# Patient Record
Sex: FEMALE | Race: WHITE | NOT HISPANIC OR LATINO | Employment: OTHER | URBAN - METROPOLITAN AREA
[De-identification: names, ages, dates, MRNs, and addresses within clinical notes are randomized per-mention and may not be internally consistent; named-entity substitution may affect disease eponyms.]

---

## 2017-04-08 ENCOUNTER — HISTORICAL (OUTPATIENT)
Dept: LAB | Facility: HOSPITAL | Age: 82
End: 2017-04-08

## 2017-05-06 ENCOUNTER — HISTORICAL (OUTPATIENT)
Dept: LAB | Facility: HOSPITAL | Age: 82
End: 2017-05-06

## 2017-05-06 LAB
CHOLEST SERPL-MCNC: 167 MG/DL (ref 0–200)
CHOLEST/HDLC SERPL: 3.2 {RATIO} (ref 0–4)
CK SERPL-CCNC: 53 UNIT/L (ref 26–192)
CRP SERPL HS-MCNC: 0.6 MG/L
ERYTHROCYTE [SEDIMENTATION RATE] IN BLOOD: 7 MM/HR (ref 0–30)
HDLC SERPL-MCNC: 53 MG/DL (ref 35–60)
LDLC SERPL CALC-MCNC: 83 MG/DL (ref 0–129)
TRIGL SERPL-MCNC: 157 MG/DL (ref 30–150)
VLDLC SERPL CALC-MCNC: 31 MG/DL

## 2017-12-16 ENCOUNTER — HISTORICAL (OUTPATIENT)
Dept: LAB | Facility: HOSPITAL | Age: 82
End: 2017-12-16

## 2017-12-16 LAB
ABS NEUT (OLG): 5.25 X10(3)/MCL (ref 2.1–9.2)
ALBUMIN SERPL-MCNC: 3.8 GM/DL (ref 3.4–5)
ALBUMIN/GLOB SERPL: 1 {RATIO}
ALP SERPL-CCNC: 109 UNIT/L (ref 45–117)
ALT SERPL-CCNC: 22 UNIT/L (ref 13–56)
AST SERPL-CCNC: 23 UNIT/L (ref 15–37)
BILIRUB SERPL-MCNC: 0.5 MG/DL (ref 0.2–1)
BILIRUBIN DIRECT+TOT PNL SERPL-MCNC: 0.1 MG/DL (ref 0–0.2)
BILIRUBIN DIRECT+TOT PNL SERPL-MCNC: 0.4 MG/DL (ref 0–1)
BUN SERPL-MCNC: 24 MG/DL (ref 7–18)
CALCIUM SERPL-MCNC: 10.1 MG/DL (ref 8.5–10.1)
CHLORIDE SERPL-SCNC: 101 MMOL/L (ref 98–107)
CHOLEST SERPL-MCNC: 224 MG/DL (ref 0–200)
CHOLEST/HDLC SERPL: 5.5 {RATIO} (ref 0–4)
CO2 SERPL-SCNC: 30 MMOL/L (ref 21–32)
CREAT SERPL-MCNC: 1.1 MG/DL (ref 0.55–1.02)
ERYTHROCYTE [DISTWIDTH] IN BLOOD BY AUTOMATED COUNT: 12.9 % (ref 11.5–17)
GLOBULIN SER-MCNC: 4 GM/DL (ref 2–4)
GLUCOSE SERPL-MCNC: 94 MG/DL (ref 74–106)
HCT VFR BLD AUTO: 44.2 % (ref 37–47)
HDLC SERPL-MCNC: 41 MG/DL (ref 35–60)
HGB BLD-MCNC: 14.6 GM/DL (ref 12–16)
LDLC SERPL CALC-MCNC: 132 MG/DL (ref 0–129)
MCH RBC QN AUTO: 29.6 PG (ref 27–31)
MCHC RBC AUTO-ENTMCNC: 33 GM/DL (ref 33–36)
MCV RBC AUTO: 89.5 FL (ref 80–94)
PLATELET # BLD AUTO: 216 X10(3)/MCL (ref 130–400)
PMV BLD AUTO: 8.9 FL (ref 7.4–10.4)
POTASSIUM SERPL-SCNC: 4.1 MMOL/L (ref 3.5–5.1)
PROT SERPL-MCNC: 7.5 GM/DL (ref 6.4–8.2)
RBC # BLD AUTO: 4.94 X10(6)/MCL (ref 4.2–5.4)
SODIUM SERPL-SCNC: 138 MMOL/L (ref 136–145)
TRIGL SERPL-MCNC: 253 MG/DL (ref 30–150)
TSH SERPL-ACNC: 1.31 MIU/ML (ref 0.36–3.74)
VLDLC SERPL CALC-MCNC: 51 MG/DL
WBC # SPEC AUTO: 8.5 X10(3)/MCL (ref 4.5–11.5)

## 2018-05-01 ENCOUNTER — HISTORICAL (OUTPATIENT)
Dept: RADIOLOGY | Facility: HOSPITAL | Age: 83
End: 2018-05-01

## 2018-05-04 ENCOUNTER — HISTORICAL (OUTPATIENT)
Dept: LAB | Facility: HOSPITAL | Age: 83
End: 2018-05-04

## 2018-05-04 LAB
ABS NEUT (OLG): 5.12 X10(3)/MCL (ref 2.1–9.2)
ALBUMIN SERPL-MCNC: 3.6 GM/DL (ref 3.4–5)
ALBUMIN/GLOB SERPL: 1 {RATIO}
ALP SERPL-CCNC: 73 UNIT/L (ref 45–117)
ALT SERPL-CCNC: 20 UNIT/L (ref 13–56)
AST SERPL-CCNC: 20 UNIT/L (ref 15–37)
BILIRUB SERPL-MCNC: 0.5 MG/DL (ref 0.2–1)
BILIRUBIN DIRECT+TOT PNL SERPL-MCNC: 0.1 MG/DL (ref 0–0.2)
BILIRUBIN DIRECT+TOT PNL SERPL-MCNC: 0.4 MG/DL (ref 0–1)
BUN SERPL-MCNC: 23 MG/DL (ref 7–18)
CALCIUM SERPL-MCNC: 10.3 MG/DL (ref 8.5–10.1)
CHLORIDE SERPL-SCNC: 103 MMOL/L (ref 98–107)
CHOLEST SERPL-MCNC: 156 MG/DL (ref 0–200)
CHOLEST/HDLC SERPL: 3.6 {RATIO} (ref 0–4)
CO2 SERPL-SCNC: 32 MMOL/L (ref 21–32)
CREAT SERPL-MCNC: 1.21 MG/DL (ref 0.55–1.02)
ERYTHROCYTE [DISTWIDTH] IN BLOOD BY AUTOMATED COUNT: 12.8 % (ref 11.5–17)
GLOBULIN SER-MCNC: 4 GM/DL (ref 2–4)
GLUCOSE SERPL-MCNC: 88 MG/DL (ref 74–106)
HCT VFR BLD AUTO: 45.1 % (ref 37–47)
HDLC SERPL-MCNC: 43 MG/DL (ref 35–60)
HGB BLD-MCNC: 14.7 GM/DL (ref 12–16)
LDLC SERPL CALC-MCNC: 61 MG/DL (ref 0–129)
MCH RBC QN AUTO: 29.3 PG (ref 27–31)
MCHC RBC AUTO-ENTMCNC: 32.6 GM/DL (ref 33–36)
MCV RBC AUTO: 89.8 FL (ref 80–94)
PLATELET # BLD AUTO: 208 X10(3)/MCL (ref 130–400)
PMV BLD AUTO: 9 FL (ref 7.4–10.4)
POTASSIUM SERPL-SCNC: 4.6 MMOL/L (ref 3.5–5.1)
PROT SERPL-MCNC: 7.1 GM/DL (ref 6.4–8.2)
RBC # BLD AUTO: 5.02 X10(6)/MCL (ref 4.2–5.4)
SODIUM SERPL-SCNC: 142 MMOL/L (ref 136–145)
TRIGL SERPL-MCNC: 260 MG/DL (ref 30–150)
TSH SERPL-ACNC: 2.11 MIU/ML (ref 0.36–3.74)
VLDLC SERPL CALC-MCNC: 52 MG/DL
WBC # SPEC AUTO: 8.3 X10(3)/MCL (ref 4.5–11.5)

## 2018-05-31 ENCOUNTER — HISTORICAL (OUTPATIENT)
Dept: RADIOLOGY | Facility: HOSPITAL | Age: 83
End: 2018-05-31

## 2018-08-01 ENCOUNTER — HISTORICAL (OUTPATIENT)
Dept: LAB | Facility: HOSPITAL | Age: 83
End: 2018-08-01

## 2018-08-01 LAB
HEMOCCULT SP1 STL QL: NEGATIVE
HEMOCCULT SP2 STL QL: NEGATIVE

## 2018-10-30 ENCOUNTER — HISTORICAL (OUTPATIENT)
Dept: LAB | Facility: HOSPITAL | Age: 83
End: 2018-10-30

## 2018-10-30 LAB
ABS NEUT (OLG): 5.08 X10(3)/MCL (ref 2.1–9.2)
ALBUMIN SERPL-MCNC: 3.8 GM/DL (ref 3.4–5)
ALBUMIN/GLOB SERPL: 1 {RATIO}
ALP SERPL-CCNC: 90 UNIT/L (ref 45–117)
ALT SERPL-CCNC: 21 UNIT/L (ref 13–56)
AST SERPL-CCNC: 23 UNIT/L (ref 15–37)
BASOPHILS # BLD AUTO: 0.04 X10(3)/MCL (ref 0–0.2)
BASOPHILS NFR BLD AUTO: 0.5 % (ref 0–1)
BILIRUB SERPL-MCNC: 0.8 MG/DL (ref 0.2–1)
BILIRUBIN DIRECT+TOT PNL SERPL-MCNC: 0.1 MG/DL (ref 0–0.2)
BILIRUBIN DIRECT+TOT PNL SERPL-MCNC: 0.7 MG/DL (ref 0–1)
BUN SERPL-MCNC: 25 MG/DL (ref 7–18)
CALCIUM SERPL-MCNC: 9.7 MG/DL (ref 8.5–10.1)
CHLORIDE SERPL-SCNC: 103 MMOL/L (ref 98–107)
CHOLEST SERPL-MCNC: 170 MG/DL (ref 0–199)
CHOLEST/HDLC SERPL: 4 MG/DL (ref 0–8)
CO2 SERPL-SCNC: 26 MMOL/L (ref 21–32)
CREAT SERPL-MCNC: 1.19 MG/DL (ref 0.55–1.02)
EOSINOPHIL # BLD AUTO: 0.26 X10(3)/MCL (ref 0–0.9)
EOSINOPHIL NFR BLD AUTO: 3.3 % (ref 0–6.4)
ERYTHROCYTE [DISTWIDTH] IN BLOOD BY AUTOMATED COUNT: 13.3 % (ref 11.5–17)
GLOBULIN SER-MCNC: 3 GM/DL (ref 2–4)
GLUCOSE SERPL-MCNC: 94 MG/DL (ref 74–106)
HCT VFR BLD AUTO: 45.3 % (ref 37–47)
HDLC SERPL-MCNC: 46 MG/DL
HGB BLD-MCNC: 15.1 GM/DL (ref 12–16)
IMM GRANULOCYTES # BLD AUTO: 0.01 10*3/UL (ref 0–0.02)
IMM GRANULOCYTES NFR BLD AUTO: 0.1 % (ref 0–0.43)
LDLC SERPL CALC-MCNC: 85 MG/DL (ref 0–129)
LYMPHOCYTES # BLD AUTO: 1.81 X10(3)/MCL (ref 0.6–4.6)
LYMPHOCYTES NFR BLD AUTO: 22.7 % (ref 16–44)
MCH RBC QN AUTO: 30 PG (ref 27–31)
MCHC RBC AUTO-ENTMCNC: 33.3 GM/DL (ref 33–36)
MCV RBC AUTO: 89.9 FL (ref 80–94)
MONOCYTES # BLD AUTO: 0.76 X10(3)/MCL (ref 0.1–1.3)
MONOCYTES NFR BLD AUTO: 9.5 % (ref 4–12.1)
NEUTROPHILS # BLD AUTO: 5.08 X10(3)/MCL (ref 2.1–9.2)
NEUTROPHILS NFR BLD AUTO: 63.9 % (ref 43–73)
NRBC BLD AUTO-RTO: 0 % (ref 0–0.2)
PLATELET # BLD AUTO: 211 X10(3)/MCL (ref 130–400)
PMV BLD AUTO: 9.6 FL (ref 7.4–10.4)
POTASSIUM SERPL-SCNC: 4.5 MMOL/L (ref 3.5–5.1)
PROT SERPL-MCNC: 7.2 GM/DL (ref 6.4–8.2)
RBC # BLD AUTO: 5.04 X10(6)/MCL (ref 4.2–5.4)
SODIUM SERPL-SCNC: 137 MMOL/L (ref 136–145)
T4 FREE SERPL-MCNC: 1.18 NG/DL (ref 0.76–1.46)
TRIGL SERPL-MCNC: 193 MG/DL (ref 0–149)
TSH SERPL-ACNC: 1.66 MIU/ML (ref 0.36–3.74)
VLDLC SERPL CALC-MCNC: 39 MG/DL
WBC # SPEC AUTO: 8 X10(3)/MCL (ref 4.5–11.5)

## 2019-03-25 ENCOUNTER — HISTORICAL (OUTPATIENT)
Dept: LAB | Facility: HOSPITAL | Age: 84
End: 2019-03-25

## 2019-03-25 LAB
ABS NEUT (OLG): 5.95 X10(3)/MCL (ref 2.1–9.2)
ALBUMIN SERPL-MCNC: 3.7 GM/DL (ref 3.4–5)
ALBUMIN/GLOB SERPL: 0.9 {RATIO}
ALP SERPL-CCNC: 80 UNIT/L (ref 45–117)
ALT SERPL-CCNC: 21 UNIT/L (ref 13–56)
AST SERPL-CCNC: 22 UNIT/L (ref 15–37)
BASOPHILS # BLD AUTO: 0.03 X10(3)/MCL (ref 0–0.2)
BASOPHILS NFR BLD AUTO: 0.3 % (ref 0–1)
BILIRUB SERPL-MCNC: 0.6 MG/DL (ref 0.2–1)
BILIRUBIN DIRECT+TOT PNL SERPL-MCNC: 0.19 MG/DL (ref 0–0.2)
BILIRUBIN DIRECT+TOT PNL SERPL-MCNC: 0.41 MG/DL (ref 0–1)
BUN SERPL-MCNC: 23 MG/DL (ref 7–18)
CALCIUM SERPL-MCNC: 9.9 MG/DL (ref 8.5–10.1)
CHLORIDE SERPL-SCNC: 100 MMOL/L (ref 98–107)
CHOLEST SERPL-MCNC: 182 MG/DL (ref 0–199)
CHOLEST/HDLC SERPL: 4 MG/DL (ref 0–8)
CK SERPL-CCNC: 57 UNIT/L (ref 26–192)
CO2 SERPL-SCNC: 30 MMOL/L (ref 21–32)
CREAT SERPL-MCNC: 1.06 MG/DL (ref 0.55–1.02)
CRP SERPL HS-MCNC: 1.82 MG/L
EOSINOPHIL # BLD AUTO: 0.24 X10(3)/MCL (ref 0–0.9)
EOSINOPHIL NFR BLD AUTO: 2.6 % (ref 0–6.4)
ERYTHROCYTE [DISTWIDTH] IN BLOOD BY AUTOMATED COUNT: 12.8 % (ref 11.5–17)
ERYTHROCYTE [SEDIMENTATION RATE] IN BLOOD: 13 MM/HR (ref 0–30)
GLOBULIN SER-MCNC: 4 GM/DL (ref 2–4)
GLUCOSE SERPL-MCNC: 87 MG/DL (ref 74–106)
HCT VFR BLD AUTO: 49.4 % (ref 37–47)
HDLC SERPL-MCNC: 47 MG/DL
HGB BLD-MCNC: 16.9 GM/DL (ref 12–16)
IMM GRANULOCYTES # BLD AUTO: 0.02 10*3/UL (ref 0–0.02)
IMM GRANULOCYTES NFR BLD AUTO: 0.2 % (ref 0–0.43)
LDLC SERPL CALC-MCNC: 87 MG/DL (ref 0–129)
LYMPHOCYTES # BLD AUTO: 2.19 X10(3)/MCL (ref 0.6–4.6)
LYMPHOCYTES NFR BLD AUTO: 23.6 % (ref 16–44)
MCH RBC QN AUTO: 31.9 PG (ref 27–31)
MCHC RBC AUTO-ENTMCNC: 34.2 GM/DL (ref 33–36)
MCV RBC AUTO: 93.2 FL (ref 80–94)
MONOCYTES # BLD AUTO: 0.86 X10(3)/MCL (ref 0.1–1.3)
MONOCYTES NFR BLD AUTO: 9.3 % (ref 4–12.1)
NEUTROPHILS # BLD AUTO: 5.95 X10(3)/MCL (ref 2.1–9.2)
NEUTROPHILS NFR BLD AUTO: 64 % (ref 43–73)
NRBC BLD AUTO-RTO: 0 % (ref 0–0.2)
PLATELET # BLD AUTO: 225 X10(3)/MCL (ref 130–400)
PMV BLD AUTO: 9.5 FL (ref 7.4–10.4)
POTASSIUM SERPL-SCNC: 4.9 MMOL/L (ref 3.5–5.1)
PROT SERPL-MCNC: 7.6 GM/DL (ref 6.4–8.2)
RBC # BLD AUTO: 5.3 X10(6)/MCL (ref 4.2–5.4)
SODIUM SERPL-SCNC: 138 MMOL/L (ref 136–145)
T3FREE SERPL-MCNC: 2.72 PG/ML (ref 2.18–3.98)
T4 FREE SERPL-MCNC: 1.11 NG/DL (ref 0.76–1.46)
TRIGL SERPL-MCNC: 240 MG/DL (ref 0–149)
TSH SERPL-ACNC: 1.8 MIU/ML (ref 0.36–3.74)
VLDLC SERPL CALC-MCNC: 48 MG/DL
WBC # SPEC AUTO: 9.3 X10(3)/MCL (ref 4.5–11.5)

## 2019-10-29 ENCOUNTER — HISTORICAL (OUTPATIENT)
Dept: LAB | Facility: HOSPITAL | Age: 84
End: 2019-10-29

## 2019-10-29 LAB
ABS NEUT (OLG): 5.35 X10(3)/MCL (ref 2.1–9.2)
ALBUMIN SERPL-MCNC: 3.8 GM/DL (ref 3.4–5)
ALBUMIN/GLOB SERPL: 1 {RATIO}
ALP SERPL-CCNC: 80 UNIT/L (ref 45–117)
ALT SERPL-CCNC: 19 UNIT/L (ref 13–56)
APPEARANCE, UA: ABNORMAL
AST SERPL-CCNC: 23 UNIT/L (ref 15–37)
BACTERIA SPEC CULT: ABNORMAL /HPF
BASOPHILS # BLD AUTO: 0.04 X10(3)/MCL (ref 0–0.2)
BASOPHILS NFR BLD AUTO: 0.5 % (ref 0–1)
BILIRUB SERPL-MCNC: 0.5 MG/DL (ref 0.2–1)
BILIRUB UR QL STRIP: NEGATIVE
BILIRUBIN DIRECT+TOT PNL SERPL-MCNC: 0.15 MG/DL (ref 0–0.2)
BILIRUBIN DIRECT+TOT PNL SERPL-MCNC: 0.35 MG/DL (ref 0–1)
BUN SERPL-MCNC: 22 MG/DL (ref 7–18)
CALCIUM SERPL-MCNC: 10.3 MG/DL (ref 8.5–10.1)
CHLORIDE SERPL-SCNC: 100 MMOL/L (ref 98–107)
CHOLEST SERPL-MCNC: 178 MG/DL (ref 0–199)
CHOLEST/HDLC SERPL: 4 {RATIO}
CO2 SERPL-SCNC: 31 MMOL/L (ref 21–32)
COLOR UR: YELLOW
CREAT SERPL-MCNC: 1.3 MG/DL (ref 0.55–1.02)
DEPRECATED CALCIDIOL+CALCIFEROL SERPL-MC: 38.34 NG/ML (ref 30–80)
EOSINOPHIL # BLD AUTO: 0.32 X10(3)/MCL (ref 0–0.9)
EOSINOPHIL NFR BLD AUTO: 3.7 % (ref 0–6.4)
ERYTHROCYTE [DISTWIDTH] IN BLOOD BY AUTOMATED COUNT: 12.6 % (ref 11.5–17)
GLOBULIN SER-MCNC: 4 GM/DL (ref 2–4)
GLUCOSE (UA): NEGATIVE
GLUCOSE SERPL-MCNC: 91 MG/DL (ref 74–106)
HCT VFR BLD AUTO: 49.9 % (ref 37–47)
HDLC SERPL-MCNC: 46 MG/DL
HGB BLD-MCNC: 16.7 GM/DL (ref 12–16)
HGB UR QL STRIP: NEGATIVE
IMM GRANULOCYTES # BLD AUTO: 0.02 10*3/UL (ref 0–0.02)
IMM GRANULOCYTES NFR BLD AUTO: 0.2 % (ref 0–0.43)
KETONES UR QL STRIP: NEGATIVE
LDLC SERPL CALC-MCNC: 87 MG/DL (ref 0–129)
LEUKOCYTE ESTERASE UR QL STRIP: ABNORMAL
LYMPHOCYTES # BLD AUTO: 2.21 X10(3)/MCL (ref 0.6–4.6)
LYMPHOCYTES NFR BLD AUTO: 25.6 % (ref 16–44)
MCH RBC QN AUTO: 31.5 PG (ref 27–31)
MCHC RBC AUTO-ENTMCNC: 33.5 GM/DL (ref 33–36)
MCV RBC AUTO: 94.2 FL (ref 80–94)
MONOCYTES # BLD AUTO: 0.68 X10(3)/MCL (ref 0.1–1.3)
MONOCYTES NFR BLD AUTO: 7.9 % (ref 4–12.1)
NEUTROPHILS # BLD AUTO: 5.35 X10(3)/MCL (ref 2.1–9.2)
NEUTROPHILS NFR BLD AUTO: 62.1 % (ref 43–73)
NITRITE UR QL STRIP: NEGATIVE
NRBC BLD AUTO-RTO: 0 % (ref 0–0.2)
PH UR STRIP: 7.5 [PH] (ref 5–7)
PLATELET # BLD AUTO: 235 X10(3)/MCL (ref 130–400)
PMV BLD AUTO: 9.4 FL (ref 7.4–10.4)
POTASSIUM SERPL-SCNC: 4.5 MMOL/L (ref 3.5–5.1)
PROT SERPL-MCNC: 7.5 GM/DL (ref 6.4–8.2)
PROT UR QL STRIP: NEGATIVE
RBC # BLD AUTO: 5.3 X10(6)/MCL (ref 4.2–5.4)
RBC #/AREA URNS HPF: 0 /[HPF]
SODIUM SERPL-SCNC: 138 MMOL/L (ref 136–145)
SP GR UR STRIP: 1.01 (ref 1–1.03)
SQUAMOUS EPITHELIAL, UA: ABNORMAL /LPF
T4 FREE SERPL-MCNC: 1.18 NG/DL (ref 0.76–1.46)
TRIGL SERPL-MCNC: 227 MG/DL (ref 0–149)
TSH SERPL-ACNC: 2.33 MIU/ML (ref 0.36–3.74)
UROBILINOGEN UR STRIP-ACNC: NEGATIVE
VLDLC SERPL CALC-MCNC: 45 MG/DL
WBC # SPEC AUTO: 8.6 X10(3)/MCL (ref 4.5–11.5)
WBC #/AREA URNS HPF: ABNORMAL /HPF

## 2019-10-30 LAB — FINAL CULTURE: NORMAL

## 2019-11-14 ENCOUNTER — HISTORICAL (OUTPATIENT)
Dept: LAB | Facility: HOSPITAL | Age: 84
End: 2019-11-14

## 2020-05-27 ENCOUNTER — HISTORICAL (OUTPATIENT)
Dept: ADMINISTRATIVE | Facility: HOSPITAL | Age: 85
End: 2020-05-27

## 2020-05-27 LAB
APPEARANCE, UA: ABNORMAL
BACTERIA SPEC CULT: ABNORMAL /HPF
BILIRUB UR QL STRIP: NEGATIVE
COLOR UR: YELLOW
GLUCOSE (UA): NEGATIVE
HGB UR QL STRIP: NEGATIVE
KETONES UR QL STRIP: ABNORMAL
LEUKOCYTE ESTERASE UR QL STRIP: ABNORMAL
NITRITE UR QL STRIP: NEGATIVE
PH UR STRIP: 6.5 [PH] (ref 5–9)
PROT UR QL STRIP: NEGATIVE
RBC #/AREA URNS HPF: ABNORMAL /[HPF]
SP GR UR STRIP: 1.02 (ref 1–1.03)
SQUAMOUS EPITHELIAL, UA: ABNORMAL
UROBILINOGEN UR STRIP-ACNC: 1
WBC #/AREA URNS HPF: 9 /HPF (ref 0–3)

## 2020-06-29 ENCOUNTER — HISTORICAL (OUTPATIENT)
Dept: LAB | Facility: HOSPITAL | Age: 85
End: 2020-06-29

## 2020-06-29 LAB
ABS NEUT (OLG): 17.19 X10(3)/MCL (ref 2.1–9.2)
ALBUMIN SERPL-MCNC: 3.7 GM/DL (ref 3.4–4.8)
ALBUMIN/GLOB SERPL: 1.2 RATIO (ref 1.1–2)
ALP SERPL-CCNC: 81 UNIT/L (ref 40–150)
ALT SERPL-CCNC: 15 UNIT/L (ref 0–55)
APPEARANCE, UA: ABNORMAL
AST SERPL-CCNC: 18 UNIT/L (ref 5–34)
BACTERIA SPEC CULT: ABNORMAL /HPF
BASOPHILS # BLD AUTO: 0.05 X10(3)/MCL (ref 0–0.2)
BASOPHILS NFR BLD AUTO: 0.2 % (ref 0–1)
BILIRUB SERPL-MCNC: 0.9 MG/DL (ref 0.2–1.2)
BILIRUB UR QL STRIP: NEGATIVE
BILIRUBIN DIRECT+TOT PNL SERPL-MCNC: 0.4 MG/DL (ref 0–0.5)
BILIRUBIN DIRECT+TOT PNL SERPL-MCNC: 0.5 MG/DL (ref 0–0.8)
BUN SERPL-MCNC: 23.1 MG/DL (ref 9.8–20.1)
CALCIUM SERPL-MCNC: 11 MG/DL (ref 8.4–10.2)
CHLORIDE SERPL-SCNC: 96 MMOL/L (ref 98–107)
CHOLEST SERPL-MCNC: 123 MG/DL
CHOLEST/HDLC SERPL: 3 {RATIO} (ref 0–5)
CO2 SERPL-SCNC: 26 MMOL/L (ref 23–31)
COLOR UR: YELLOW
CREAT SERPL-MCNC: 0.99 MG/DL (ref 0.57–1.11)
DEPRECATED CALCIDIOL+CALCIFEROL SERPL-MC: 44 NG/ML (ref 6.6–49.9)
EOSINOPHIL # BLD AUTO: 0.14 X10(3)/MCL (ref 0–0.9)
EOSINOPHIL NFR BLD AUTO: 0.7 % (ref 0–6.4)
ERYTHROCYTE [DISTWIDTH] IN BLOOD BY AUTOMATED COUNT: 12 % (ref 11.5–17)
GLOBULIN SER-MCNC: 3.2 GM/DL (ref 2.4–3.5)
GLUCOSE (UA): NEGATIVE
GLUCOSE SERPL-MCNC: 102 MG/DL (ref 82–115)
HCT VFR BLD AUTO: 45.9 % (ref 37–47)
HDLC SERPL-MCNC: 42 MG/DL (ref 40–60)
HGB BLD-MCNC: 15.6 GM/DL (ref 12–16)
HGB UR QL STRIP: NEGATIVE
IMM GRANULOCYTES # BLD AUTO: 0.1 10*3/UL (ref 0–0.02)
IMM GRANULOCYTES NFR BLD AUTO: 0.5 % (ref 0–0.43)
KETONES UR QL STRIP: NEGATIVE
LDLC SERPL CALC-MCNC: 51 MG/DL (ref 50–140)
LEUKOCYTE ESTERASE UR QL STRIP: ABNORMAL
LYMPHOCYTES # BLD AUTO: 1.98 X10(3)/MCL (ref 0.6–4.6)
LYMPHOCYTES NFR BLD AUTO: 9.6 % (ref 16–44)
MCH RBC QN AUTO: 31.9 PG (ref 27–31)
MCHC RBC AUTO-ENTMCNC: 34 GM/DL (ref 33–36)
MCV RBC AUTO: 93.9 FL (ref 80–94)
MONOCYTES # BLD AUTO: 1.16 X10(3)/MCL (ref 0.1–1.3)
MONOCYTES NFR BLD AUTO: 5.6 % (ref 4–12.1)
NEUTROPHILS # BLD AUTO: 17.19 X10(3)/MCL (ref 2.1–9.2)
NEUTROPHILS NFR BLD AUTO: 83.4 % (ref 43–73)
NITRITE UR QL STRIP: NEGATIVE
NRBC BLD AUTO-RTO: 0 % (ref 0–0.2)
PH UR STRIP: 6 [PH] (ref 5–7)
PLATELET # BLD AUTO: 193 X10(3)/MCL (ref 130–400)
PMV BLD AUTO: 9.3 FL (ref 7.4–10.4)
POTASSIUM SERPL-SCNC: 4.1 MMOL/L (ref 3.5–5.1)
PROT SERPL-MCNC: 6.9 GM/DL (ref 5.8–7.6)
PROT UR QL STRIP: NEGATIVE
RBC # BLD AUTO: 4.89 X10(6)/MCL (ref 4.2–5.4)
RBC #/AREA URNS HPF: 0 /[HPF]
SODIUM SERPL-SCNC: 134 MMOL/L (ref 136–145)
SP GR UR STRIP: 1.01 (ref 1–1.03)
SQUAMOUS EPITHELIAL, UA: ABNORMAL /LPF
T4 FREE SERPL-MCNC: 1.21 NG/DL (ref 0.7–1.48)
TRIGL SERPL-MCNC: 148 MG/DL (ref 0–150)
TSH SERPL-ACNC: 0.91 UIU/ML (ref 0.35–4.94)
UROBILINOGEN UR STRIP-ACNC: NEGATIVE
VLDLC SERPL CALC-MCNC: 30 MG/DL
WBC # SPEC AUTO: 20.6 X10(3)/MCL (ref 4.5–11.5)
WBC #/AREA URNS HPF: ABNORMAL /HPF

## 2020-07-06 ENCOUNTER — HISTORICAL (OUTPATIENT)
Dept: RADIOLOGY | Facility: HOSPITAL | Age: 85
End: 2020-07-06

## 2020-07-06 LAB
ABS NEUT (OLG): 7.64 X10(3)/MCL (ref 2.1–9.2)
BASOPHILS # BLD AUTO: 0.03 X10(3)/MCL (ref 0–0.2)
BASOPHILS NFR BLD AUTO: 0.3 % (ref 0–1)
EOSINOPHIL # BLD AUTO: 0.37 X10(3)/MCL (ref 0–0.9)
EOSINOPHIL NFR BLD AUTO: 3.2 % (ref 0–6.4)
ERYTHROCYTE [DISTWIDTH] IN BLOOD BY AUTOMATED COUNT: 12 % (ref 11.5–17)
HCT VFR BLD AUTO: 46.3 % (ref 37–47)
HGB BLD-MCNC: 15.4 GM/DL (ref 12–16)
IMM GRANULOCYTES # BLD AUTO: 0.03 10*3/UL (ref 0–0.02)
IMM GRANULOCYTES NFR BLD AUTO: 0.3 % (ref 0–0.43)
LYMPHOCYTES # BLD AUTO: 2.73 X10(3)/MCL (ref 0.6–4.6)
LYMPHOCYTES NFR BLD AUTO: 23.4 % (ref 16–44)
MCH RBC QN AUTO: 31.7 PG (ref 27–31)
MCHC RBC AUTO-ENTMCNC: 33.3 GM/DL (ref 33–36)
MCV RBC AUTO: 95.3 FL (ref 80–94)
MONOCYTES # BLD AUTO: 0.86 X10(3)/MCL (ref 0.1–1.3)
MONOCYTES NFR BLD AUTO: 7.4 % (ref 4–12.1)
NEUTROPHILS # BLD AUTO: 7.64 X10(3)/MCL (ref 2.1–9.2)
NEUTROPHILS NFR BLD AUTO: 65.4 % (ref 43–73)
NRBC BLD AUTO-RTO: 0 % (ref 0–0.2)
PLATELET # BLD AUTO: 210 X10(3)/MCL (ref 130–400)
PMV BLD AUTO: 9.2 FL (ref 7.4–10.4)
RBC # BLD AUTO: 4.86 X10(6)/MCL (ref 4.2–5.4)
WBC # SPEC AUTO: 11.7 X10(3)/MCL (ref 4.5–11.5)

## 2020-07-08 LAB — FINAL CULTURE: NO GROWTH

## 2020-07-11 LAB
FINAL CULTURE: NORMAL
FINAL CULTURE: NORMAL

## 2020-08-12 ENCOUNTER — HISTORICAL (OUTPATIENT)
Dept: RADIOLOGY | Facility: HOSPITAL | Age: 85
End: 2020-08-12

## 2020-09-07 ENCOUNTER — NURSE TRIAGE (OUTPATIENT)
Dept: ADMINISTRATIVE | Facility: CLINIC | Age: 85
End: 2020-09-07

## 2020-09-08 NOTE — TELEPHONE ENCOUNTER
Reason for Disposition   Requesting regular office appointment    Protocols used: INFORMATION ONLY CALL - NO TRIAGE-A-AH

## 2020-09-08 NOTE — TELEPHONE ENCOUNTER
Patient states for weeks she has had an  Infection on L arm. Has been cleaning with peroxide and antibiotic ointment with no improvement. Calling to state she will be in the area on tomorrow morning for a Cardiology appointment and wants to schedule appointment with Dr. Soto as well. Informed to call in am. Understanding verbalized.   Care Advice Given     No Care Advice given for this encounter.

## 2020-11-04 ENCOUNTER — HISTORICAL (OUTPATIENT)
Dept: ADMINISTRATIVE | Facility: HOSPITAL | Age: 85
End: 2020-11-04

## 2020-11-04 LAB
APPEARANCE, UA: CLEAR
BACTERIA SPEC CULT: NORMAL /HPF
BILIRUB SERPL-MCNC: NEGATIVE MG/DL
BILIRUB UR QL STRIP: NEGATIVE
BLOOD URINE, POC: NEGATIVE
CLARITY, POC UA: CLEAR
COLOR UR: YELLOW
COLOR, POC UA: YELLOW
GLUCOSE (UA): NEGATIVE
GLUCOSE UR QL STRIP: NEGATIVE
HGB UR QL STRIP: NEGATIVE
KETONES UR QL STRIP: NEGATIVE
KETONES UR QL STRIP: NEGATIVE
LEUKOCYTE ESTERASE UR QL STRIP: NEGATIVE
NITRITE UR QL STRIP: NEGATIVE
NITRITE, POC UA: NEGATIVE
PH UR STRIP: 5.5 [PH] (ref 5–9)
PH, POC UA: 6
PROT UR QL STRIP: NEGATIVE
PROTEIN, POC: NEGATIVE
RBC #/AREA URNS HPF: NORMAL /[HPF]
SP GR UR STRIP: 1.01 (ref 1–1.03)
SPECIFIC GRAVITY, POC UA: 1.01
SQUAMOUS EPITHELIAL, UA: NORMAL
UROBILINOGEN UR STRIP-ACNC: 0.2
WBC #/AREA URNS HPF: NORMAL /[HPF]

## 2020-11-06 LAB — FINAL CULTURE: NO GROWTH

## 2021-10-20 ENCOUNTER — HISTORICAL (OUTPATIENT)
Dept: RADIOLOGY | Facility: HOSPITAL | Age: 86
End: 2021-10-20

## 2021-10-20 LAB
ABS NEUT (OLG): 6.21 X10(3)/MCL (ref 2.1–9.2)
ALBUMIN SERPL-MCNC: 3.9 GM/DL (ref 3.4–4.8)
ALBUMIN/GLOB SERPL: 1.3 RATIO (ref 1.1–2)
ALP SERPL-CCNC: 80 UNIT/L (ref 40–150)
ALT SERPL-CCNC: 19 UNIT/L (ref 0–55)
APPEARANCE, UA: CLEAR
AST SERPL-CCNC: 28 UNIT/L (ref 5–34)
BASOPHILS # BLD AUTO: 0.04 X10(3)/MCL (ref 0–0.2)
BASOPHILS NFR BLD AUTO: 0.4 % (ref 0–1)
BILIRUB SERPL-MCNC: 1 MG/DL (ref 0.2–1.2)
BILIRUB UR QL STRIP: NEGATIVE
BILIRUBIN DIRECT+TOT PNL SERPL-MCNC: 0.4 MG/DL (ref 0–0.5)
BILIRUBIN DIRECT+TOT PNL SERPL-MCNC: 0.6 MG/DL (ref 0–0.8)
BUN SERPL-MCNC: 19 MG/DL (ref 9.8–20.1)
CALCIUM SERPL-MCNC: 10.6 MG/DL (ref 8.4–10.2)
CHLORIDE SERPL-SCNC: 95 MMOL/L (ref 98–107)
CO2 SERPL-SCNC: 31 MMOL/L (ref 23–31)
COLOR UR: NORMAL
CREAT SERPL-MCNC: 0.82 MG/DL (ref 0.57–1.11)
EOSINOPHIL # BLD AUTO: 0.28 X10(3)/MCL (ref 0–0.9)
EOSINOPHIL NFR BLD AUTO: 2.9 % (ref 0–6.4)
ERYTHROCYTE [DISTWIDTH] IN BLOOD BY AUTOMATED COUNT: 13 % (ref 11.5–17)
GLOBULIN SER-MCNC: 3.1 GM/DL (ref 2.4–3.5)
GLUCOSE (UA): NEGATIVE
GLUCOSE SERPL-MCNC: 90 MG/DL (ref 82–115)
HCT VFR BLD AUTO: 43.5 % (ref 37–47)
HGB BLD-MCNC: 14.5 GM/DL (ref 12–16)
HGB UR QL STRIP: NEGATIVE
IMM GRANULOCYTES # BLD AUTO: 0.02 10*3/UL (ref 0–0.02)
IMM GRANULOCYTES NFR BLD AUTO: 0.2 % (ref 0–0.43)
KETONES UR QL STRIP: NEGATIVE
LEUKOCYTE ESTERASE UR QL STRIP: NEGATIVE
LYMPHOCYTES # BLD AUTO: 2.3 X10(3)/MCL (ref 0.6–4.6)
LYMPHOCYTES NFR BLD AUTO: 23.8 % (ref 16–44)
MCH RBC QN AUTO: 31.3 PG (ref 27–31)
MCHC RBC AUTO-ENTMCNC: 33.3 GM/DL (ref 33–36)
MCV RBC AUTO: 94 FL (ref 80–94)
MONOCYTES # BLD AUTO: 0.83 X10(3)/MCL (ref 0.1–1.3)
MONOCYTES NFR BLD AUTO: 8.6 % (ref 4–12.1)
NEUTROPHILS # BLD AUTO: 6.21 X10(3)/MCL (ref 2.1–9.2)
NEUTROPHILS NFR BLD AUTO: 64.1 % (ref 43–73)
NITRITE UR QL STRIP: NEGATIVE
NRBC BLD AUTO-RTO: 0 % (ref 0–0.2)
PH UR STRIP: 6.5 [PH] (ref 5–7)
PLATELET # BLD AUTO: 187 X10(3)/MCL (ref 130–400)
PMV BLD AUTO: 9 FL (ref 7.4–10.4)
POTASSIUM SERPL-SCNC: 4.4 MMOL/L (ref 3.5–5.1)
PROT SERPL-MCNC: 7 GM/DL (ref 5.8–7.6)
PROT UR QL STRIP: NEGATIVE
RBC # BLD AUTO: 4.63 X10(6)/MCL (ref 4.2–5.4)
SODIUM SERPL-SCNC: 133 MMOL/L (ref 136–145)
SP GR UR STRIP: 1.01 (ref 1–1.03)
UROBILINOGEN UR STRIP-ACNC: NEGATIVE
WBC # SPEC AUTO: 9.7 X10(3)/MCL (ref 4.5–11.5)

## 2022-03-15 ENCOUNTER — HISTORICAL (OUTPATIENT)
Dept: LAB | Facility: HOSPITAL | Age: 87
End: 2022-03-15

## 2022-03-15 LAB
ABS NEUT (OLG): 8.14 (ref 2.1–9.2)
ALBUMIN SERPL-MCNC: 3.8 G/DL (ref 3.4–4.8)
ALBUMIN/GLOB SERPL: 1.2 {RATIO} (ref 1.1–2)
ALP SERPL-CCNC: 73 U/L (ref 40–150)
ALT SERPL-CCNC: 12 U/L (ref 0–55)
AST SERPL-CCNC: 20 U/L (ref 5–34)
BASOPHILS # BLD AUTO: 0.05 10*3/UL (ref 0–0.2)
BASOPHILS NFR BLD AUTO: 0.4 % (ref 0–1)
BILIRUB SERPL-MCNC: 0.7 MG/DL (ref 0.2–1.2)
BILIRUBIN DIRECT+TOT PNL SERPL-MCNC: 0.3 (ref 0–0.5)
BILIRUBIN DIRECT+TOT PNL SERPL-MCNC: 0.4 (ref 0–0.8)
BUN SERPL-MCNC: 17.6 MG/DL (ref 9.8–20.1)
CALCIUM SERPL-MCNC: 10.7 MG/DL (ref 8.4–10.2)
CHLORIDE SERPL-SCNC: 99 MMOL/L (ref 98–107)
CHOLEST SERPL-MCNC: 120 MG/DL
CHOLEST/HDLC SERPL: 3 {RATIO} (ref 0–5)
CO2 SERPL-SCNC: 28 MMOL/L (ref 23–31)
CREAT SERPL-MCNC: 1.01 MG/DL (ref 0.57–1.11)
EOSINOPHIL # BLD AUTO: 0.3 10*3/UL (ref 0–0.9)
EOSINOPHIL NFR BLD AUTO: 2.5 % (ref 0–6.4)
ERYTHROCYTE [DISTWIDTH] IN BLOOD BY AUTOMATED COUNT: 12.3 % (ref 11.5–17)
GLOBULIN SER-MCNC: 3.3 G/DL (ref 2.4–3.5)
GLUCOSE SERPL-MCNC: 102 MG/DL (ref 82–115)
HCT VFR BLD AUTO: 47.6 % (ref 37–47)
HDLC SERPL-MCNC: 37 MG/DL (ref 40–60)
HEMOLYSIS INTERF INDEX SERPL-ACNC: 9
HGB BLD-MCNC: 15.5 G/DL (ref 12–16)
ICTERIC INTERF INDEX SERPL-ACNC: 0
IMM GRANULOCYTES # BLD AUTO: 0.06 10*3/UL (ref 0–0.02)
IMM GRANULOCYTES NFR BLD AUTO: 0.5 % (ref 0–0.43)
LDLC SERPL CALC-MCNC: 56 MG/DL (ref 50–140)
LIPEMIC INTERF INDEX SERPL-ACNC: 7
LYMPHOCYTES # BLD AUTO: 2.33 10*3/UL (ref 0.6–4.6)
LYMPHOCYTES NFR BLD AUTO: 19.6 % (ref 16–44)
MANUAL DIFF? (OHS): NO
MCH RBC QN AUTO: 31.5 PG (ref 27–31)
MCHC RBC AUTO-ENTMCNC: 32.6 G/DL (ref 33–36)
MCV RBC AUTO: 96.7 FL (ref 80–94)
MONOCYTES # BLD AUTO: 0.98 10*3/UL (ref 0.1–1.3)
MONOCYTES NFR BLD AUTO: 8.3 % (ref 4–12.1)
NEUTROPHILS # BLD AUTO: 8.14 10*3/UL (ref 2.1–9.2)
NEUTROPHILS NFR BLD AUTO: 68.7 % (ref 43–73)
NRBC BLD AUTO-RTO: 0 % (ref 0–0.2)
PLATELET # BLD AUTO: 222 10*3/UL (ref 130–400)
PMV BLD AUTO: 8.9 FL (ref 7.4–10.4)
POTASSIUM SERPL-SCNC: 4.6 MMOL/L (ref 3.5–5.1)
PROT SERPL-MCNC: 7.1 G/DL (ref 5.8–7.6)
RBC # BLD AUTO: 4.92 10*6/UL (ref 4.2–5.4)
SODIUM SERPL-SCNC: 137 MMOL/L (ref 136–145)
TRIGL SERPL-MCNC: 135 MG/DL (ref 0–150)
TSH SERPL-ACNC: 1.44 M[IU]/L (ref 0.35–4.94)
VLDLC SERPL CALC-MCNC: 27 MG/DL
WBC # SPEC AUTO: 11.9 10*3/UL (ref 4.5–11.5)

## 2022-03-25 ENCOUNTER — HISTORICAL (OUTPATIENT)
Dept: LAB | Facility: HOSPITAL | Age: 87
End: 2022-03-25

## 2022-03-25 LAB
ABS NEUT (OLG): 7.03 (ref 2.1–9.2)
BASOPHILS # BLD AUTO: 0.04 10*3/UL (ref 0–0.2)
BASOPHILS NFR BLD AUTO: 0.4 % (ref 0–1)
DEPRECATED CALCIDIOL+CALCIFEROL SERPL-MC: 38.7 NG/ML (ref 30–80)
EOSINOPHIL # BLD AUTO: 0.25 10*3/UL (ref 0–0.9)
EOSINOPHIL NFR BLD AUTO: 2.5 % (ref 0–6.4)
ERYTHROCYTE [DISTWIDTH] IN BLOOD BY AUTOMATED COUNT: 13.1 % (ref 11.5–17)
HCT VFR BLD AUTO: 52.8 % (ref 37–47)
HGB BLD-MCNC: 17.2 G/DL (ref 12–16)
IMM GRANULOCYTES # BLD AUTO: 0.03 10*3/UL (ref 0–0.02)
IMM GRANULOCYTES NFR BLD AUTO: 0.3 % (ref 0–0.43)
LYMPHOCYTES # BLD AUTO: 1.8 10*3/UL (ref 0.6–4.6)
LYMPHOCYTES NFR BLD AUTO: 18.2 % (ref 16–44)
MANUAL DIFF? (OHS): NO
MCH RBC QN AUTO: 31.7 PG (ref 27–31)
MCHC RBC AUTO-ENTMCNC: 32.6 G/DL (ref 33–36)
MCV RBC AUTO: 97.2 FL (ref 80–94)
MONOCYTES # BLD AUTO: 0.73 10*3/UL (ref 0.1–1.3)
MONOCYTES NFR BLD AUTO: 7.4 % (ref 4–12.1)
NEUTROPHILS # BLD AUTO: 7.03 10*3/UL (ref 2.1–9.2)
NEUTROPHILS NFR BLD AUTO: 71.2 % (ref 43–73)
NRBC BLD AUTO-RTO: 0 % (ref 0–0.2)
PLATELET # BLD AUTO: 227 10*3/UL (ref 130–400)
PMV BLD AUTO: 9.4 FL (ref 7.4–10.4)
PTH-INTACT SERPL-MCNC: 160.7 PG/ML (ref 8.7–77.1)
RBC # BLD AUTO: 5.43 10*6/UL (ref 4.2–5.4)
WBC # SPEC AUTO: 9.9 10*3/UL (ref 4.5–11.5)

## 2022-04-11 ENCOUNTER — HISTORICAL (OUTPATIENT)
Dept: ADMINISTRATIVE | Facility: HOSPITAL | Age: 87
End: 2022-04-11

## 2022-04-28 VITALS
HEIGHT: 62 IN | OXYGEN SATURATION: 97 % | SYSTOLIC BLOOD PRESSURE: 118 MMHG | BODY MASS INDEX: 28.39 KG/M2 | WEIGHT: 154.31 LBS | DIASTOLIC BLOOD PRESSURE: 70 MMHG

## 2022-06-06 PROBLEM — M81.0 OSTEOPOROSIS: Status: ACTIVE | Noted: 2022-06-06

## 2022-06-06 PROBLEM — F41.9 ANXIETY: Status: ACTIVE | Noted: 2022-06-06

## 2022-06-06 PROBLEM — I10 HTN (HYPERTENSION), BENIGN: Status: ACTIVE | Noted: 2022-06-06

## 2022-06-06 PROBLEM — M79.7 FIBROMYALGIA: Status: ACTIVE | Noted: 2022-06-06

## 2022-06-06 PROBLEM — K21.00 GERD WITH ESOPHAGITIS: Status: ACTIVE | Noted: 2022-06-06

## 2022-06-06 PROBLEM — E78.5 HYPERLIPIDEMIA: Status: ACTIVE | Noted: 2022-06-06

## 2022-06-06 PROBLEM — M19.90 OA (OSTEOARTHRITIS): Status: ACTIVE | Noted: 2022-06-06

## 2022-06-06 PROBLEM — G47.00 INSOMNIA: Status: ACTIVE | Noted: 2022-06-06

## 2022-06-06 PROBLEM — E03.9 HYPOTHYROIDISM: Status: ACTIVE | Noted: 2022-06-06

## 2022-06-06 PROBLEM — N18.30 CKD (CHRONIC KIDNEY DISEASE) STAGE 3, GFR 30-59 ML/MIN: Status: ACTIVE | Noted: 2022-06-06

## 2022-07-11 ENCOUNTER — TELEPHONE (OUTPATIENT)
Dept: FAMILY MEDICINE | Facility: CLINIC | Age: 87
End: 2022-07-11

## 2022-07-11 ENCOUNTER — OFFICE VISIT (OUTPATIENT)
Dept: FAMILY MEDICINE | Facility: CLINIC | Age: 87
End: 2022-07-11
Payer: MEDICARE

## 2022-07-11 VITALS
BODY MASS INDEX: 27 KG/M2 | RESPIRATION RATE: 16 BRPM | WEIGHT: 143 LBS | OXYGEN SATURATION: 98 % | SYSTOLIC BLOOD PRESSURE: 116 MMHG | TEMPERATURE: 98 F | DIASTOLIC BLOOD PRESSURE: 72 MMHG | HEIGHT: 61 IN

## 2022-07-11 DIAGNOSIS — N18.31 STAGE 3A CHRONIC KIDNEY DISEASE: ICD-10-CM

## 2022-07-11 DIAGNOSIS — F41.9 ANXIETY: Primary | ICD-10-CM

## 2022-07-11 PROCEDURE — 99214 PR OFFICE/OUTPT VISIT, EST, LEVL IV, 30-39 MIN: ICD-10-PCS | Mod: ,,, | Performed by: INTERNAL MEDICINE

## 2022-07-11 PROCEDURE — 1159F MED LIST DOCD IN RCRD: CPT | Mod: CPTII,,, | Performed by: INTERNAL MEDICINE

## 2022-07-11 PROCEDURE — 99214 OFFICE O/P EST MOD 30 MIN: CPT | Mod: ,,, | Performed by: INTERNAL MEDICINE

## 2022-07-11 PROCEDURE — 1159F PR MEDICATION LIST DOCUMENTED IN MEDICAL RECORD: ICD-10-PCS | Mod: CPTII,,, | Performed by: INTERNAL MEDICINE

## 2022-07-11 RX ORDER — METOPROLOL SUCCINATE 25 MG/1
25 TABLET, EXTENDED RELEASE ORAL 2 TIMES DAILY
COMMUNITY
End: 2022-08-16 | Stop reason: SDUPTHER

## 2022-07-11 RX ORDER — CELECOXIB 200 MG/1
200 CAPSULE ORAL DAILY
COMMUNITY
Start: 2022-02-16 | End: 2022-08-31

## 2022-07-11 RX ORDER — LISINOPRIL 10 MG/1
10 TABLET ORAL DAILY
COMMUNITY
End: 2022-09-23

## 2022-07-11 RX ORDER — DULOXETIN HYDROCHLORIDE 60 MG/1
60 CAPSULE, DELAYED RELEASE ORAL EVERY MORNING
COMMUNITY
End: 2023-02-08 | Stop reason: SDUPTHER

## 2022-07-11 RX ORDER — DULOXETIN HYDROCHLORIDE 30 MG/1
30 CAPSULE, DELAYED RELEASE ORAL DAILY
COMMUNITY
End: 2023-12-29 | Stop reason: CLARIF

## 2022-07-11 RX ORDER — AMLODIPINE BESYLATE 10 MG/1
10 TABLET ORAL DAILY
COMMUNITY
End: 2023-02-14

## 2022-07-11 RX ORDER — GABAPENTIN 100 MG/1
100 CAPSULE ORAL 2 TIMES DAILY
COMMUNITY
End: 2023-09-18 | Stop reason: SDUPTHER

## 2022-07-11 RX ORDER — TRAZODONE HYDROCHLORIDE 50 MG/1
50 TABLET ORAL DAILY
COMMUNITY
Start: 2021-12-16 | End: 2023-02-08

## 2022-07-11 NOTE — TELEPHONE ENCOUNTER
Tried contacting patient son/Eliza   No answer. Left Detailed message notifying patient son of appt.

## 2022-07-11 NOTE — TELEPHONE ENCOUNTER
Spoke to patient son/Eliza   The last few days patient has been very active and then just a day ago all she has been wanting to do it lay in bed. Patient son things patient might have just over did herself but just wants her to be checked out to be on the safe side   Please advise     ----- Message from Brianne Alejo sent at 7/11/2022  8:41 AM CDT -----  Regarding: niecy appt requested  Type:  Sooner Apoointment Request    Caller is requesting a sooner appointment.  Caller declined first available appointment listed below.  Caller will not accept being placed on the waitlist and is requesting a message be sent to doctor.  Name of Caller:Patient's son  When is the first available appointment? 08/26/22  Symptoms:caller stated patient has had a few rough days  Would the patient rather a call back or a response via [facility]? marion  Best Call Back Number:[phone]  Additional Information: Call patient's son back.

## 2022-07-11 NOTE — PROGRESS NOTES
Subjective:      Patient ID: Edwin Boyer is a 87 y.o. female.    Chief Complaint: Follow-up (Restless nights )    Disclaimer:  This note is prepared using voice recognition software and as such is likely to have errors and has not been proof read. Please contact me for questions.     HPI     Patient is a 87 year old  female here today with her son Eliza.  Patient reports that she went to Dr. Hernandez for her parathyroid issue- was treated with something- says she got very sick, was hallucinating. Stopped medication, started to feel better- unsure of what was Rx. Need records. Then she got sad as well because of lot of neighbors moving out of complex. She was stressed and upset. She says that she did see her psychologist Dr. Vásquez who increased her cymbalta which helps. She says that Friday night she has episode of severe diarrhea, now resolved. No fever, no chills, fatigue is improving each day, energy levels are slowly improving. Son was concerned about these few episodes.     Hypothyroidism: synthroid 75 mcg po by daily (generic only); no hot/cold intolerance  HTN: compliant with medication  HLD: rosuvastatin 20 mg po daily , no myalgias, aspirin 81 mg po daily  GERD without esophagitis: prilosec 20 mg po daily  urinary incontinence: worse  OA: taking celebrex 200 mg po daily  Fibromyalgia: cymbalta 60 mg daily, gabapentin 100 mg (two caps BID)  and tylenol #3 TID as needed (Dr. Shook orders this)  Insomnia: Trazodone 50 mg po at bedtime  osteoporosis: noted on DEXA 8/2020,  *did not tolerate alendronate, GI issues  *says that she cannot afford Prolia  bilateral carpal tunnel syndrome: waking up at night with bilateral hand numbness  CKD 3: stable, does not drink much water    surgery history:  angiogram  breast cyst removal , L breast  complete hysterectomy, no cancer  bilateral knee replacements  appendectomy  c/s x 3      Rheumatologist: Dr. Shook  Cardiologist: Dr. 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  no driving  wears glasses  no issues with swallowing  no recent falls in past 90 days  depression screening: negative  daughter helps with bills  patient takes care of home by herself  HCPOA: sonEliza Credeur [phone]  living will: done  advanced directives: completed      No results found for: HGBA1C   Lab Results   Component Value Date    CHOL 120 03/15/2022    CHOL 123 06/29/2020    CHOL 178 10/29/2019     No results found for: LDLCALC    Wt Readings from Last 10 Encounters:   07/11/22 64.9 kg (143 lb)   12/08/21 70 kg (154 lb 5.2 oz)       The ASCVD Risk score (Triny DC Jr., et al., 2013) failed to calculate for the following reasons:    The 2013 ASCVD risk score is only valid for ages 40 to 79        Lab Results   Component Value Date    WBC 9.9 03/25/2022    HGB 17.2 03/25/2022    HCT 52.8 03/25/2022     03/25/2022    CHOL 120 03/15/2022    TRIG 135 03/15/2022    HDL 37 03/15/2022    ALT 12 03/15/2022    AST 20 03/15/2022     03/15/2022    K 4.6 03/15/2022    CREATININE 1.01 03/15/2022    BUN 17.6 03/15/2022    CO2 28 03/15/2022    TSH 1.4425 03/15/2022       No image results found.        Review of Systems  Objective:     Vitals:    07/11/22 1348   BP: 116/72   BP Location: Right arm   Patient Position: Sitting   BP Method: Large (Automatic)   Resp: 16   Temp: 98.1 °F (36.7 °C)   TempSrc: Oral   SpO2: 98%   Weight: 64.9 kg (143 lb)   Height: 5' 1" (1.549 m)     Physical Exam  Vitals and nursing note reviewed.   Constitutional:       General: " She is not in acute distress.     Appearance: Normal appearance. She is not ill-appearing.   HENT:      Head: Normocephalic and atraumatic.      Mouth/Throat:      Mouth: Mucous membranes are moist.      Pharynx: Oropharynx is clear.   Eyes:      Extraocular Movements: Extraocular movements intact.      Pupils: Pupils are equal, round, and reactive to light.   Cardiovascular:      Rate and Rhythm: Normal rate and regular rhythm.      Heart sounds: No murmur heard.  Pulmonary:      Effort: Pulmonary effort is normal.      Breath sounds: Normal breath sounds. No wheezing.   Musculoskeletal:      Cervical back: Normal range of motion and neck supple.   Skin:     General: Skin is warm and dry.   Neurological:      General: No focal deficit present.      Mental Status: She is alert and oriented to person, place, and time.      Cranial Nerves: No cranial nerve deficit.      Sensory: No sensory deficit.      Motor: No weakness.      Coordination: Coordination normal.      Gait: Gait normal.   Psychiatric:         Mood and Affect: Mood normal.         Behavior: Behavior normal.       Assessment:     1. Anxiety    2. Stage 3a chronic kidney disease      Plan:   Edwin was seen today for follow-up.    Diagnoses and all orders for this visit:    Anxiety  -     Ambulatory referral/consult to Psychology; Future  Stable, continue cymbalta  Referral placed per patient request  Stage 3a chronic kidney disease  Stable avoid nsaid and leigh 2 inhibitors  Drink more water    I need records from Dr. Hernadnez's office to understand what was given/not tolerated etc. She is upset about her experience there and with the medication as well. Will get records to try to understand. Today she is feeling better, we discussed she has to pace herself on getting back to her normal routine again. She should notify me if sx worsen or if she has any new issues.          Health Maintenance Due   Topic Date Due    Pneumococcal Vaccines (Age 65+) (2 -  PCV) 10/22/2020    COVID-19 Vaccine (4 - Booster for Pfizer series) 03/14/2022       No follow-ups on file.    There are no Patient Instructions on file for this visit.

## 2022-07-22 ENCOUNTER — TELEPHONE (OUTPATIENT)
Dept: FAMILY MEDICINE | Facility: CLINIC | Age: 87
End: 2022-07-22
Payer: MEDICARE

## 2022-07-22 NOTE — TELEPHONE ENCOUNTER
Patient had a fall yesterday.  She was walking without her walker and loss her balance. She  hurt her foot and her hands.   She is feeling much better today.     Denies ED or [facility] visit

## 2022-07-22 NOTE — TELEPHONE ENCOUNTER
Spoke to patient's son today- patient is doing well, she had a fall when she did not use her walker and landed on her bottom, no LOC. She is eating/drinking feeling fine.     Please get patient appt with me or Denice next week- okay to add on for noon on Monday if needed. If she is feeling fine, then that's ok

## 2022-07-22 NOTE — TELEPHONE ENCOUNTER
----- Message from Brianne Alejo sent at 7/22/2022  8:30 AM CDT -----  Regarding: same day appt request  Type:  Same Day Appointment Request    Caller is requesting a same day appointment.  Caller declined first available appointment listed below.    Name of Caller: Eliza Boyer, patient's son  When is the first available appointment?   Symptoms: patient fell on yesterday   Best Call Back Number:[phone]  Additional Information: the system showed an opening for today and when I tried scheduling and showed an error.  Please call Eliza back.

## 2022-07-26 ENCOUNTER — TELEPHONE (OUTPATIENT)
Dept: FAMILY MEDICINE | Facility: CLINIC | Age: 87
End: 2022-07-26
Payer: MEDICARE

## 2022-07-26 NOTE — TELEPHONE ENCOUNTER
----- Message from Chel Mandujano sent at 7/26/2022  2:54 PM CDT -----  Regarding: referral  Type:  Patient Requesting Referral    Who Called: Mary  Does the patient already have the specialty appointment scheduled?:yes  If yes, what is the date of that appointment?:06/17/2022  Referral to What Specialty:clinical & med psychologist  Reason for Referral: anxiety & depression  Does the patient want the referral with a specific physician?:Dr Broderick Vásquez  Is the specialist an [facility]?:no  Patient Requesting a Response?:np  Would the patient rather a call back or a response via [facility]?   Best Call Back Number:[phone]  Additional Information: Mary @ Dr Vásquez's office called looking for referral from pt's PCP for prev appt  Fax # [phone]

## 2022-08-02 ENCOUNTER — TELEPHONE (OUTPATIENT)
Dept: FAMILY MEDICINE | Facility: CLINIC | Age: 87
End: 2022-08-02
Payer: MEDICARE

## 2022-08-02 DIAGNOSIS — F41.9 ANXIETY: Primary | ICD-10-CM

## 2022-08-02 NOTE — TELEPHONE ENCOUNTER
----- Message from Yudith Sainz sent at 8/2/2022 11:13 AM CDT -----  Regarding: Referral  .Type:  Needs Medical Advice    Who Called: [facility]   Symptoms (please be specific):    How long has patient had these symptoms:    Pharmacy name and phone #:    Would the patient rather a call back or a response via [facility]? Call back  Best Call Back Number: [phone]  Additional Information: Clinic will not have provider that will treat pt at that age, did say pt already was seeing a Dr. Asher.

## 2022-08-02 NOTE — TELEPHONE ENCOUNTER
Please send referral to the MD that she is already seeing, not Dr. Barrett- patient needed referral to the psychologist she already has established with to continue to see him.      I have signed for the following orders AND/OR meds. Please notify the patient and ask the patient to schedule the testing and/or information about any medications that were sent.         Orders Placed This Encounter   Procedures    Ambulatory referral/consult to Psychology     Standing Status:   Future     Standing Expiration Date:   9/2/2023     Referral Priority:   Routine     Referral Type:   Psychiatric     Referral Reason:   Specialty Services Required     Requested Specialty:   Psychology     Number of Visits Requested:   1

## 2022-08-08 ENCOUNTER — OFFICE VISIT (OUTPATIENT)
Dept: FAMILY MEDICINE | Facility: CLINIC | Age: 87
End: 2022-08-08
Payer: MEDICARE

## 2022-08-08 VITALS
HEIGHT: 61 IN | RESPIRATION RATE: 18 BRPM | BODY MASS INDEX: 26.91 KG/M2 | HEART RATE: 67 BPM | OXYGEN SATURATION: 99 % | DIASTOLIC BLOOD PRESSURE: 83 MMHG | WEIGHT: 142.5 LBS | SYSTOLIC BLOOD PRESSURE: 117 MMHG | TEMPERATURE: 98 F

## 2022-08-08 DIAGNOSIS — Z00.00 WELLNESS EXAMINATION: Primary | ICD-10-CM

## 2022-08-08 DIAGNOSIS — E78.5 HYPERLIPIDEMIA, UNSPECIFIED HYPERLIPIDEMIA TYPE: ICD-10-CM

## 2022-08-08 DIAGNOSIS — E03.9 HYPOTHYROIDISM, UNSPECIFIED TYPE: ICD-10-CM

## 2022-08-08 DIAGNOSIS — I10 HTN (HYPERTENSION), BENIGN: ICD-10-CM

## 2022-08-08 DIAGNOSIS — N18.30 STAGE 3 CHRONIC KIDNEY DISEASE, UNSPECIFIED WHETHER STAGE 3A OR 3B CKD: ICD-10-CM

## 2022-08-08 PROBLEM — E83.52 HYPERCALCEMIA: Status: ACTIVE | Noted: 2022-08-08

## 2022-08-08 PROBLEM — R32 URINARY INCONTINENCE: Status: ACTIVE | Noted: 2022-08-08

## 2022-08-08 PROCEDURE — G0439 PPPS, SUBSEQ VISIT: HCPCS | Mod: ,,, | Performed by: INTERNAL MEDICINE

## 2022-08-08 PROCEDURE — 1159F PR MEDICATION LIST DOCUMENTED IN MEDICAL RECORD: ICD-10-PCS | Mod: CPTII,,, | Performed by: INTERNAL MEDICINE

## 2022-08-08 PROCEDURE — 1160F PR REVIEW ALL MEDS BY PRESCRIBER/CLIN PHARMACIST DOCUMENTED: ICD-10-PCS | Mod: CPTII,,, | Performed by: INTERNAL MEDICINE

## 2022-08-08 PROCEDURE — 1160F RVW MEDS BY RX/DR IN RCRD: CPT | Mod: CPTII,,, | Performed by: INTERNAL MEDICINE

## 2022-08-08 PROCEDURE — G0439 PR MEDICARE ANNUAL WELLNESS SUBSEQUENT VISIT: ICD-10-PCS | Mod: ,,, | Performed by: INTERNAL MEDICINE

## 2022-08-08 PROCEDURE — 1159F MED LIST DOCD IN RCRD: CPT | Mod: CPTII,,, | Performed by: INTERNAL MEDICINE

## 2022-08-08 NOTE — PROGRESS NOTES
Patient ID: [id number]     Chief Complaint: Medicare AWV      HPI:     Edwin Boyer is a 87 y.o. female here today for a Medicare Wellness. No other complaints today.     Her son Eliza is home with COVID 19. She is doing otherwise ok.     Hypothyroidism: synthroid 75 mcg po by daily (generic only); no hot/cold intolerance  HTN: compliant with medication  HLD: rosuvastatin 20 mg po daily , no myalgias, aspirin 81 mg po daily  GERD without esophagitis: prilosec 20 mg po daily  urinary incontinence: worse  OA: taking celebrex 200 mg po daily  Fibromyalgia: cymbalta 60 +30  mg daily, gabapentin 100 mg (two caps BID)  and tylenol #3 TID as needed (Dr. Shook orders this)  Anxiety/Depression: Cymbalta is helping at higher dose- she follows Dr. Vásquez with psychology  Insomnia: Trazodone 50 mg po at bedtime  osteoporosis: noted on DEXA 8/2020,  *did not tolerate alendronate, GI issues  *says that she cannot afford Prolia  bilateral carpal tunnel syndrome: waking up at night with bilateral hand numbness  CKD 3: stable, does not drink much water    surgery history:  angiogram  breast cyst removal , L breast  complete hysterectomy, no cancer  bilateral knee replacements  appendectomy  c/s x 3      Rheumatologist: Dr. Shook  Cardiologist: Dr. Guidry  urologist: Dr. 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  no driving  wears glasses  no issues with swallowing  no recent falls in past 90 days  depression screening: negative  daughter helps with bills  patient takes care of home by herself  HCPOA: son, Eliza  [facility] [phone]  living will: done  advanced directives: completed    ----------------------------  Carpal tunnel syndrome, bilateral  Chronic back pain  Fibromyalgia  Osteoporosis     Past Surgical History:   Procedure Laterality Date    APPENDECTOMY       SECTION      COLONOSCOPY      HYSTERECTOMY      TOTAL KNEE ARTHROPLASTY         Review of patient's allergies indicates:   Allergen Reactions    Meperidine      Other reaction(s): EXCITATIVE TYPE PSYCHOSIS    Sulfamethoxazole        Outpatient Medications Marked as Taking for the 22 encounter (Office Visit) with Mariely Soto MD   Medication Sig Dispense Refill    amLODIPine (NORVASC) 10 MG tablet Take 10 mg by mouth once daily.      aspirin (ECOTRIN) 81 MG EC tablet TAKE 1 TABLET EVERY DAY 90 tablet 3    celecoxib (CELEBREX) 200 MG capsule Take 200 mg by mouth once daily.      DULoxetine (CYMBALTA) 30 MG capsule Take 30 mg by mouth once daily.      DULoxetine (CYMBALTA) 60 MG capsule Take 60 mg by mouth every morning.      gabapentin (NEURONTIN) 100 MG capsule Take 100 mg by mouth 2 (two) times daily.      levothyroxine (SYNTHROID) 75 MCG tablet TAKE 1 TABLET EVERY DAY 90 tablet 3    lisinopriL 10 MG tablet Take 10 mg by mouth once daily.      metoprolol succinate (TOPROL-XL) 25 MG 24 hr tablet Take 25 mg by mouth 2 (two) times daily.      traZODone (DESYREL) 50 MG tablet Take 50 mg by mouth once daily.         Social History     Socioeconomic History    Marital status: Single   Tobacco Use    Smoking status: Never Smoker    Smokeless tobacco: Never Used   Substance and Sexual Activity    Drug use: Never        Family History   Family history unknown: Yes        Patient Care Team:  Mariely Soto MD as PCP - General (Internal Medicine)       Subjective:     Review of Systems   Constitutional: Negative for chills, fever and weight loss.   HENT: Negative for ear pain.    Eyes: Negative for pain.   Respiratory: Negative for  shortness of breath.    Cardiovascular: Negative for chest pain.   Gastrointestinal: Negative for abdominal pain.   Genitourinary: Negative for dysuria.   Skin: Negative for rash.         Patient Reported Health Risk Assessment  What is your age?: 80 or older  Are you male or female?: Female  During the past four weeks, how much have you been bothered by emotional problems such as feeling anxious, depressed, irritable, sad, or downhearted and blue?: Not at all  During the past five weeks, has your physical and/or emotional health limited your social activities with family, friends, neighbors, or groups?: Not at all  During the past four weeks, how much bodily pain have you generally had?: Very mild pain  During the past four weeks, was someone available to help if you needed and wanted help?: Yes, as much as I wanted  During the past four weeks, what was the hardest physical activity you could do for at least two minutes?: Moderate  Can you get to places out of walking distance without help?  (For example, can you travel alone on buses or taxis, or drive your own car?): Yes  Can you go shopping for groceries or clothes without someone's help?: No  Can you prepare your own meals?: No  Can you do your own housework without help?: Yes  Because of any health problems, do you need the help of another person with your personal care needs such as eating, bathing, dressing, or getting around the house?: No  Can you handle your own money without help?: No  During the past four weeks, how would you rate your health in general?: Very good  How have things been going for you during the past four weeks?: Pretty well  Are you having difficulties driving your car?: No  Do you always fasten your seat belt when you are in a car?: Yes, usually  How often in the past four weeks have you been bothered by falling or dizzy when standing up?: Never  How often in the past four weeks have you been bothered by sexual problems?: Never  How  "often in the past four weeks have you been bothered by trouble eating well?: Never  How often in the past four weeks have you been bothered by teeth or denture problems?: Never  How often in the past four weeks have you been bothered with problems using the telephone?: Never  How often in the past four weeks have you been bothered by tiredness or fatigue?: Never  Have you fallen two or more times in the past year?: No  Are you afraid of falling?: No  Are you a smoker?: No  During the past four weeks, how many drinks of wine, beer, or other alcoholic beverages did you have?: No alcohol at all  Do you exercise for about 20 minutes three or more days a week?: No, I usually do not exercise this much  Have you been given any information to help you with hazards in your house that might hurt you?: Yes  Have you been given any information to help you with keeping track of your medications?: Yes  How often do you have trouble taking medicines the way you've been told to take them?: I always take them as prescribed  How confident are you that you can control and manage most of your health problems?: Very confident    Objective:     /83 (BP Location: Left arm, Patient Position: Sitting, BP Method: Large (Automatic))   Pulse 67   Temp 98.3 °F (36.8 °C) (Oral)   Resp 18   Ht 5' 1" (1.549 m)   Wt 64.6 kg (142 lb 8 oz)   SpO2 99%   BMI 26.93 kg/m²     Physical Exam  Vitals and nursing note reviewed.   Constitutional:       Appearance: Normal appearance.   HENT:      Head: Normocephalic and atraumatic.   Eyes:      Extraocular Movements: Extraocular movements intact.      Conjunctiva/sclera: Conjunctivae normal.      Pupils: Pupils are equal, round, and reactive to light.   Cardiovascular:      Rate and Rhythm: Normal rate and regular rhythm.      Heart sounds: No murmur heard.  Pulmonary:      Effort: Pulmonary effort is normal. No respiratory distress.      Breath sounds: Normal breath sounds. No wheezing. "   Abdominal:      General: Abdomen is flat.      Palpations: Abdomen is soft.      Tenderness: There is no abdominal tenderness.   Musculoskeletal:      Cervical back: Normal range of motion and neck supple.   Lymphadenopathy:      Cervical: No cervical adenopathy.   Neurological:      Mental Status: She is alert and oriented to person, place, and time.   Psychiatric:         Behavior: Behavior normal.           No flowsheet data found.  No flowsheet data found.        Depression Screening  Over the past two weeks, has the patient felt down, depressed, or hopeless?: No  Over the past two weeks, has the patient felt little interest or pleasure in doing things?: No  Functional Ability/Safety Screening  Was the patient's timed Up & Go test unsteady or longer than 30 seconds?: No  Does the patient need help with phone, transportation, shopping, preparing meals, housework, laundry, meds, or managing money?: Yes  Does the patient's home have rugs in the hallway, lack grab bars in the bathroom, lack handrails on the stairs or have poor lighting?: No  Have you noticed any hearing difficulties?: No  Cognitive Function (Assessed through direct observation with due consideration of information obtained by way of patient reports and/or concerns raised by family, friends, caretakers, or others)    Does the patient repeat questions/statements in the same day?: No  Does the patient have trouble remembering the date, year, and time?: No  Does the patient have difficulty managing finances?: No  Does the patient have a decreased sense of direction?: No  Assessment:       ICD-10-CM ICD-9-CM   1. Wellness examination  Z00.00 V70.0   2. Hypothyroidism, unspecified type  E03.9 244.9   3. Hyperlipidemia, unspecified hyperlipidemia type  E78.5 272.4   4. HTN (hypertension), benign  I10 401.1   5. Stage 3 chronic kidney disease, unspecified whether stage 3a or 3b CKD  N18.30 585.3        Plan:       Wellness: doing well, reminded to  complete COVID 19 booster, fasting labs ordered, complete when you have ride  Hypothyroidism: stable, continue levothyroxine  HLD: stable, continue statin, check lipid panel  HTN: stable, continue ucrrent Rx  CKD 3: stable, continue to avoid NSAID and leigh 2 inhibitors, drink more water     Medicare Annual Wellness and Personalized Prevention Plan:   Fall Risk + Home Safety + Hearing Impairment + Depression Screen + Cognitive Impairment Screen + Health Risk Assessment all reviewed.       Health Maintenance Topics with due status: Not Due       Topic Last Completion Date    TETANUS VACCINE 11/06/2019    Influenza Vaccine 10/27/2021    Lipid Panel 03/15/2022      The patient's Health Maintenance was reviewed and the following appears to be due at this time:   Health Maintenance Due   Topic Date Due    COVID-19 Vaccine (4 - Booster for Pfizer series) 03/14/2022         Advance Care Planning   I attest to discussing Advance Care Planning with patient and/or family member.  Education was provided including the importance of the Health Care Power of , Advance Directives. Patient wishes to choose her son Eliza Boyer as her HCPOA, She will fill out form. She also expresses that in the event she was on life support and she was not improving, she would want to discontinue life support and start comfort care measures and accept the dying process.   The patient expressed understanding to the importance of this information and discussion.    Length of ACP conversation in minutes:  10          Medication List with Changes/Refills   Current Medications    AMLODIPINE (NORVASC) 10 MG TABLET    Take 10 mg by mouth once daily.       Start Date: --        End Date: --    ASPIRIN (ECOTRIN) 81 MG EC TABLET    TAKE 1 TABLET EVERY DAY       Start Date: 7/6/2022  End Date: --    CELECOXIB (CELEBREX) 200 MG CAPSULE    Take 200 mg by mouth once daily.       Start Date: 2/16/2022 End Date: --    DULOXETINE (CYMBALTA) 30 MG CAPSULE     Take 30 mg by mouth once daily.       Start Date: --        End Date: --    DULOXETINE (CYMBALTA) 60 MG CAPSULE    Take 60 mg by mouth every morning.       Start Date: --        End Date: --    GABAPENTIN (NEURONTIN) 100 MG CAPSULE    Take 100 mg by mouth 2 (two) times daily.       Start Date: --        End Date: --    LEVOTHYROXINE (SYNTHROID) 75 MCG TABLET    TAKE 1 TABLET EVERY DAY       Start Date: 7/6/2022  End Date: --    LISINOPRIL 10 MG TABLET    Take 10 mg by mouth once daily.       Start Date: --        End Date: --    METOPROLOL SUCCINATE (TOPROL-XL) 25 MG 24 HR TABLET    Take 25 mg by mouth 2 (two) times daily.       Start Date: --        End Date: --    TRAZODONE (DESYREL) 50 MG TABLET    Take 50 mg by mouth once daily.       Start Date: 12/16/2021End Date: --        Follow up in about 3 months (around 11/8/2022) for chronic medical conditions follow up. In addition to their scheduled follow up, the patient has also been instructed to follow up on as needed basis.

## 2022-08-16 ENCOUNTER — TELEPHONE (OUTPATIENT)
Dept: FAMILY MEDICINE | Facility: CLINIC | Age: 87
End: 2022-08-16
Payer: MEDICARE

## 2022-08-16 DIAGNOSIS — I10 HTN (HYPERTENSION), BENIGN: Primary | ICD-10-CM

## 2022-08-16 RX ORDER — METOPROLOL SUCCINATE 25 MG/1
25 TABLET, EXTENDED RELEASE ORAL 2 TIMES DAILY
Qty: 60 TABLET | Refills: 3 | Status: SHIPPED | OUTPATIENT
Start: 2022-08-16 | End: 2022-12-16

## 2022-09-02 ENCOUNTER — LAB VISIT (OUTPATIENT)
Dept: LAB | Facility: HOSPITAL | Age: 87
End: 2022-09-02
Attending: INTERNAL MEDICINE
Payer: MEDICARE

## 2022-09-02 DIAGNOSIS — Z00.00 WELLNESS EXAMINATION: ICD-10-CM

## 2022-09-02 DIAGNOSIS — E78.5 HYPERLIPIDEMIA, UNSPECIFIED HYPERLIPIDEMIA TYPE: ICD-10-CM

## 2022-09-02 DIAGNOSIS — N18.30 STAGE 3 CHRONIC KIDNEY DISEASE, UNSPECIFIED WHETHER STAGE 3A OR 3B CKD: ICD-10-CM

## 2022-09-02 DIAGNOSIS — E03.9 HYPOTHYROIDISM, UNSPECIFIED TYPE: ICD-10-CM

## 2022-09-02 DIAGNOSIS — I10 HTN (HYPERTENSION), BENIGN: ICD-10-CM

## 2022-09-02 LAB
ALBUMIN SERPL-MCNC: 4 GM/DL (ref 3.4–4.8)
ALBUMIN/GLOB SERPL: 1.3 RATIO (ref 1.1–2)
ALP SERPL-CCNC: 62 UNIT/L (ref 40–150)
ALT SERPL-CCNC: 12 UNIT/L (ref 0–55)
APPEARANCE UR: CLEAR
AST SERPL-CCNC: 19 UNIT/L (ref 5–34)
BASOPHILS # BLD AUTO: 0.05 X10(3)/MCL (ref 0–0.2)
BASOPHILS NFR BLD AUTO: 0.5 %
BILIRUB UR QL STRIP.AUTO: NEGATIVE MG/DL
BILIRUBIN DIRECT+TOT PNL SERPL-MCNC: 0.7 MG/DL
BUN SERPL-MCNC: 23.4 MG/DL (ref 9.8–20.1)
CALCIUM SERPL-MCNC: 11 MG/DL (ref 8.4–10.2)
CHLORIDE SERPL-SCNC: 105 MMOL/L (ref 98–107)
CHOLEST SERPL-MCNC: 115 MG/DL
CHOLEST/HDLC SERPL: 3 {RATIO} (ref 0–5)
CO2 SERPL-SCNC: 28 MMOL/L (ref 23–31)
COLOR UR AUTO: YELLOW
CREAT SERPL-MCNC: 0.83 MG/DL (ref 0.55–1.02)
EOSINOPHIL # BLD AUTO: 0.34 X10(3)/MCL (ref 0–0.9)
EOSINOPHIL NFR BLD AUTO: 3.7 %
ERYTHROCYTE [DISTWIDTH] IN BLOOD BY AUTOMATED COUNT: 12.5 % (ref 11.5–17)
GFR SERPLBLD CREATININE-BSD FMLA CKD-EPI: >60 MLS/MIN/1.73/M2
GLOBULIN SER-MCNC: 3.2 GM/DL (ref 2.4–3.5)
GLUCOSE SERPL-MCNC: 109 MG/DL (ref 82–115)
GLUCOSE UR QL STRIP.AUTO: NEGATIVE MG/DL
HCT VFR BLD AUTO: 46.3 % (ref 37–47)
HDLC SERPL-MCNC: 41 MG/DL (ref 35–60)
HGB BLD-MCNC: 15 GM/DL (ref 12–16)
IMM GRANULOCYTES # BLD AUTO: 0.02 X10(3)/MCL (ref 0–0.04)
IMM GRANULOCYTES NFR BLD AUTO: 0.2 %
KETONES UR QL STRIP.AUTO: NEGATIVE MG/DL
LDLC SERPL CALC-MCNC: 54 MG/DL (ref 50–140)
LEUKOCYTE ESTERASE UR QL STRIP.AUTO: NEGATIVE UNIT/L
LYMPHOCYTES # BLD AUTO: 2.05 X10(3)/MCL (ref 0.6–4.6)
LYMPHOCYTES NFR BLD AUTO: 22.2 %
MCH RBC QN AUTO: 31.4 PG (ref 27–31)
MCHC RBC AUTO-ENTMCNC: 32.4 MG/DL (ref 33–36)
MCV RBC AUTO: 96.9 FL (ref 80–94)
MONOCYTES # BLD AUTO: 0.73 X10(3)/MCL (ref 0.1–1.3)
MONOCYTES NFR BLD AUTO: 7.9 %
NEUTROPHILS # BLD AUTO: 6.1 X10(3)/MCL (ref 2.1–9.2)
NEUTROPHILS NFR BLD AUTO: 65.5 %
NITRITE UR QL STRIP.AUTO: NEGATIVE
NRBC BLD AUTO-RTO: 0 %
PH UR STRIP.AUTO: 6 [PH]
PLATELET # BLD AUTO: 176 X10(3)/MCL (ref 130–400)
PMV BLD AUTO: 9.4 FL (ref 7.4–10.4)
POTASSIUM SERPL-SCNC: 5.1 MMOL/L (ref 3.5–5.1)
PROT SERPL-MCNC: 7.2 GM/DL (ref 5.8–7.6)
PROT UR QL STRIP.AUTO: NEGATIVE MG/DL
RBC # BLD AUTO: 4.78 X10(6)/MCL (ref 4.2–5.4)
RBC UR QL AUTO: NEGATIVE UNIT/L
SODIUM SERPL-SCNC: 141 MMOL/L (ref 136–145)
SP GR UR STRIP.AUTO: 1.02
TRIGL SERPL-MCNC: 98 MG/DL (ref 37–140)
TSH SERPL-ACNC: 0.83 UIU/ML (ref 0.35–4.94)
UROBILINOGEN UR STRIP-ACNC: 1 MG/DL
VLDLC SERPL CALC-MCNC: 20 MG/DL
WBC # SPEC AUTO: 9.3 X10(3)/MCL (ref 4.5–11.5)

## 2022-09-02 PROCEDURE — 80053 COMPREHEN METABOLIC PANEL: CPT

## 2022-09-02 PROCEDURE — 81003 URINALYSIS AUTO W/O SCOPE: CPT

## 2022-09-02 PROCEDURE — 85025 COMPLETE CBC W/AUTO DIFF WBC: CPT

## 2022-09-02 PROCEDURE — 36415 COLL VENOUS BLD VENIPUNCTURE: CPT

## 2022-09-02 PROCEDURE — 80061 LIPID PANEL: CPT

## 2022-09-02 PROCEDURE — 84443 ASSAY THYROID STIM HORMONE: CPT

## 2022-09-21 ENCOUNTER — HISTORICAL (OUTPATIENT)
Dept: ADMINISTRATIVE | Facility: HOSPITAL | Age: 87
End: 2022-09-21
Payer: MEDICARE

## 2022-11-08 ENCOUNTER — OFFICE VISIT (OUTPATIENT)
Dept: FAMILY MEDICINE | Facility: CLINIC | Age: 87
End: 2022-11-08
Payer: MEDICARE

## 2022-11-08 VITALS
HEART RATE: 68 BPM | HEIGHT: 61 IN | DIASTOLIC BLOOD PRESSURE: 60 MMHG | TEMPERATURE: 99 F | BODY MASS INDEX: 27.06 KG/M2 | RESPIRATION RATE: 18 BRPM | OXYGEN SATURATION: 96 % | SYSTOLIC BLOOD PRESSURE: 110 MMHG | WEIGHT: 143.31 LBS

## 2022-11-08 DIAGNOSIS — R32 URINARY INCONTINENCE, UNSPECIFIED TYPE: ICD-10-CM

## 2022-11-08 DIAGNOSIS — E83.52 HYPERCALCEMIA: Primary | ICD-10-CM

## 2022-11-08 DIAGNOSIS — I10 HTN (HYPERTENSION), BENIGN: ICD-10-CM

## 2022-11-08 PROCEDURE — 1159F MED LIST DOCD IN RCRD: CPT | Mod: CPTII,,, | Performed by: INTERNAL MEDICINE

## 2022-11-08 PROCEDURE — G0008 ADMIN INFLUENZA VIRUS VAC: HCPCS | Mod: ,,, | Performed by: INTERNAL MEDICINE

## 2022-11-08 PROCEDURE — 90694 VACC AIIV4 NO PRSRV 0.5ML IM: CPT | Mod: ,,, | Performed by: INTERNAL MEDICINE

## 2022-11-08 PROCEDURE — 1159F PR MEDICATION LIST DOCUMENTED IN MEDICAL RECORD: ICD-10-PCS | Mod: CPTII,,, | Performed by: INTERNAL MEDICINE

## 2022-11-08 PROCEDURE — 90694 FLU VACCINE - QUADRIVALENT - ADJUVANTED: ICD-10-PCS | Mod: ,,, | Performed by: INTERNAL MEDICINE

## 2022-11-08 PROCEDURE — 99214 PR OFFICE/OUTPT VISIT, EST, LEVL IV, 30-39 MIN: ICD-10-PCS | Mod: ,,, | Performed by: INTERNAL MEDICINE

## 2022-11-08 PROCEDURE — G0008 FLU VACCINE - QUADRIVALENT - ADJUVANTED: ICD-10-PCS | Mod: ,,, | Performed by: INTERNAL MEDICINE

## 2022-11-08 PROCEDURE — 99214 OFFICE O/P EST MOD 30 MIN: CPT | Mod: ,,, | Performed by: INTERNAL MEDICINE

## 2022-11-08 RX ORDER — CINACALCET 30 MG/1
1 TABLET, FILM COATED ORAL DAILY
COMMUNITY
Start: 2022-06-21 | End: 2022-11-08 | Stop reason: SDUPTHER

## 2022-11-08 RX ORDER — ROSUVASTATIN CALCIUM 20 MG/1
1 TABLET, COATED ORAL DAILY
COMMUNITY
Start: 2022-07-06 | End: 2023-08-29

## 2022-11-08 RX ORDER — OXYBUTYNIN CHLORIDE 10 MG/1
10 TABLET, EXTENDED RELEASE ORAL DAILY
COMMUNITY
Start: 2022-07-16 | End: 2023-12-29 | Stop reason: CLARIF

## 2022-11-08 RX ORDER — CINACALCET 30 MG/1
30 TABLET, FILM COATED ORAL DAILY
Qty: 30 TABLET | Refills: 3 | Status: SHIPPED | OUTPATIENT
Start: 2022-11-08 | End: 2023-05-09 | Stop reason: SDUPTHER

## 2022-11-08 NOTE — PROGRESS NOTES
Subjective:      Patient ID: Edwin Boyer is a 87 y.o. female.    Chief Complaint: Follow-up    HPI  Last wellness 8/8/2022    Hypercalcemia: has seen Dr. Hernandez before, was recommended to start sensipar, patient did not want to take medication sh ewas concerned   Hypothyroidism: synthroid 75 mcg po by daily (generic only); no hot/cold intolerance  HTN: compliant with medication  HLD: rosuvastatin 20 mg po daily , no myalgias, aspirin 81 mg po daily  GERD without esophagitis: prilosec 20 mg po daily  urinary incontinence: worse; says oxybutynin did not help  OA: taking celebrex 200 mg po daily  Fibromyalgia: cymbalta 60 +30  mg daily, gabapentin 100 mg (two caps BID)  and tylenol #3 TID as needed (Dr. Shook orders this)  Anxiety/Depression: Cymbalta is helping at higher dose- she follows Dr. Vásquez with psychology  Insomnia: Trazodone 50 mg po at bedtime  osteoporosis: noted on DEXA 8/2020,  *did not tolerate alendronate, GI issues  *says that she cannot afford Prolia  bilateral carpal tunnel syndrome: waking up at night with bilateral hand numbness  CKD 3: stable, does not drink much water    surgery history:  angiogram  breast cyst removal , L breast  complete hysterectomy, no cancer  bilateral knee replacements  appendectomy  c/s x 3      Rheumatologist: Dr. Shook  Cardiologist: Dr. Guidry  urologist: Dr. 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   HENT:  Negative for congestion, sinus pressure and sore throat.    Respiratory:  Negative for cough and shortness of breath.    Cardiovascular:  Negative for chest pain and palpitations.   Gastrointestinal:  Negative for abdominal pain and nausea.   Skin:  Negative for rash.   A comprehensive ROS was performed and is negative except as noted above.  Objective:     Vitals:    11/08/22 1143 11/08/22 1224   BP: 112/80 110/60   BP Location: Left arm    Patient Position: Sitting    BP Method: Medium (Automatic)    Pulse: 68    Resp: 18    Temp: 98.7 °F (37.1 °C)    TempSrc: Temporal    SpO2: 96%    Weight: 65 kg (143 lb 4.8 oz)    Height: 5' 1" (1.549 m)      Physical Exam  Vitals and nursing note reviewed.   Constitutional:       Appearance: Normal appearance.   HENT:      Head: Normocephalic and atraumatic.   Cardiovascular:      Rate and Rhythm: Normal rate and regular rhythm.      Heart sounds: No murmur heard.  Pulmonary:      Effort: Pulmonary effort is normal. No respiratory distress.      Breath sounds: Normal breath sounds. No wheezing.   Skin:     General: Skin is warm and dry.   Neurological:      Mental Status: She is alert and oriented to person, place, and time.   Psychiatric:         Behavior: Behavior normal.     Assessment/Plan:     1. Hypercalcemia  -     Calcium, Ionized; Future; Expected date: 12/08/2022  Start sensipar  Repeat calcium in 1 month  2. HTN (hypertension), benign  Stable, continue current Rx  3. Urinary incontinence, unspecified type  Uncontrolled, d/c oxybutynin, trial of myrbetriq sample 25 mg po daily    Other orders  -     cinacalcet " (SENSIPAR) 30 MG Tab; Take 1 tablet (30 mg total) by mouth once daily.  Dispense: 30 tablet; Refill: 3     Follow up in about 3 months (around 2/8/2023) for Follow Up - Chronic Conditions.    This includes face to face time and non-face to face time preparing to see the patient (eg, review of tests), obtaining and/or reviewing separately obtained history, documenting clinical information in the electronic or other health record, independently interpreting results and communicating results to the patient/family/caregiver, or care coordinator.

## 2022-11-22 ENCOUNTER — TELEPHONE (OUTPATIENT)
Dept: FAMILY MEDICINE | Facility: CLINIC | Age: 87
End: 2022-11-22
Payer: MEDICARE

## 2022-11-22 DIAGNOSIS — M19.90 OSTEOARTHRITIS, UNSPECIFIED OSTEOARTHRITIS TYPE, UNSPECIFIED SITE: Primary | ICD-10-CM

## 2022-11-22 RX ORDER — ACETAMINOPHEN AND CODEINE PHOSPHATE 300; 30 MG/1; MG/1
1 TABLET ORAL EVERY 8 HOURS PRN
Qty: 120 TABLET | Refills: 0 | Status: SHIPPED | OUTPATIENT
Start: 2022-11-22 | End: 2022-12-02

## 2022-11-22 NOTE — TELEPHONE ENCOUNTER
I have signed for the following orders AND/OR meds. Please notify the patient and ask the patient to schedule the testing and/or information about any medications that were sent.      Medications Ordered This Encounter   Medications    acetaminophen-codeine 300-30mg (TYLENOL #3) 300-30 mg Tab     Sig: Take 1 tablet by mouth every 8 (eight) hours as needed (pain).     Dispense:  120 tablet     Refill:  0     Quantity prescribed more than 7 day supply? Yes, quantity medically necessary DX: OA     Order Specific Question:   I have reviewed the Prescription Drug Monitoring Program (PDMP) database for this patient prior to prescribing the above opioid medication     Answer:   Yes     No orders of the defined types were placed in this encounter.

## 2022-11-22 NOTE — TELEPHONE ENCOUNTER
I called pt to get more info  She stated that Dr. Shook refused to refill her meds  She stated that you would refill it for her  Please advise  Thanks

## 2022-11-22 NOTE — TELEPHONE ENCOUNTER
----- Message from Yudith Sainz sent at 11/22/2022  9:33 AM CST -----  Regarding: Meds  .Type:  RX Refill Request    Who Called: Pt   Refill or New Rx:Refill  RX Name and Strength:Tylenol-Codeine #3 300 mg-30 mg tablet  How is the patient currently taking it? (ex. 1XDay):  Is this a 30 day or 90 day RX:  Preferred Pharmacy with phone number:[id number] [place] [facility] [address] AT [facility] [address]  Local or Mail Order:Local  Ordering Provider:Brittany  Would the patient rather a call back or a response via [facility]? Call back  Best Call Back Number:[phone]  Additional Information:

## 2022-12-02 ENCOUNTER — LAB VISIT (OUTPATIENT)
Dept: LAB | Facility: HOSPITAL | Age: 87
End: 2022-12-02
Attending: INTERNAL MEDICINE
Payer: MEDICARE

## 2022-12-02 DIAGNOSIS — E83.52 HYPERCALCEMIA: ICD-10-CM

## 2022-12-02 PROCEDURE — 36415 COLL VENOUS BLD VENIPUNCTURE: CPT

## 2022-12-02 PROCEDURE — 82330 ASSAY OF CALCIUM: CPT

## 2022-12-04 LAB — CA-I ADJ PH7.4 SERPL ISE-MCNC: 5.33 MG/DL (ref 4.57–5.43)

## 2023-01-05 ENCOUNTER — TELEPHONE (OUTPATIENT)
Dept: FAMILY MEDICINE | Facility: CLINIC | Age: 88
End: 2023-01-05
Payer: MEDICARE

## 2023-01-09 ENCOUNTER — OFFICE VISIT (OUTPATIENT)
Dept: FAMILY MEDICINE | Facility: CLINIC | Age: 88
End: 2023-01-09
Payer: MEDICARE

## 2023-01-09 VITALS
SYSTOLIC BLOOD PRESSURE: 138 MMHG | RESPIRATION RATE: 18 BRPM | BODY MASS INDEX: 26.96 KG/M2 | WEIGHT: 142.81 LBS | HEART RATE: 66 BPM | TEMPERATURE: 98 F | HEIGHT: 61 IN | OXYGEN SATURATION: 97 % | DIASTOLIC BLOOD PRESSURE: 72 MMHG

## 2023-01-09 DIAGNOSIS — U07.1 COVID-19: Primary | ICD-10-CM

## 2023-01-09 DIAGNOSIS — N30.90 CYSTITIS: ICD-10-CM

## 2023-01-09 PROCEDURE — 1160F RVW MEDS BY RX/DR IN RCRD: CPT | Mod: CPTII,,, | Performed by: INTERNAL MEDICINE

## 2023-01-09 PROCEDURE — 1101F PR PT FALLS ASSESS DOC 0-1 FALLS W/OUT INJ PAST YR: ICD-10-PCS | Mod: CPTII,,, | Performed by: INTERNAL MEDICINE

## 2023-01-09 PROCEDURE — 99214 PR OFFICE/OUTPT VISIT, EST, LEVL IV, 30-39 MIN: ICD-10-PCS | Mod: ,,, | Performed by: INTERNAL MEDICINE

## 2023-01-09 PROCEDURE — 1101F PT FALLS ASSESS-DOCD LE1/YR: CPT | Mod: CPTII,,, | Performed by: INTERNAL MEDICINE

## 2023-01-09 PROCEDURE — 1160F PR REVIEW ALL MEDS BY PRESCRIBER/CLIN PHARMACIST DOCUMENTED: ICD-10-PCS | Mod: CPTII,,, | Performed by: INTERNAL MEDICINE

## 2023-01-09 PROCEDURE — 1159F PR MEDICATION LIST DOCUMENTED IN MEDICAL RECORD: ICD-10-PCS | Mod: CPTII,,, | Performed by: INTERNAL MEDICINE

## 2023-01-09 PROCEDURE — 1159F MED LIST DOCD IN RCRD: CPT | Mod: CPTII,,, | Performed by: INTERNAL MEDICINE

## 2023-01-09 PROCEDURE — 3288F FALL RISK ASSESSMENT DOCD: CPT | Mod: CPTII,,, | Performed by: INTERNAL MEDICINE

## 2023-01-09 PROCEDURE — 3288F PR FALLS RISK ASSESSMENT DOCUMENTED: ICD-10-PCS | Mod: CPTII,,, | Performed by: INTERNAL MEDICINE

## 2023-01-09 PROCEDURE — 99214 OFFICE O/P EST MOD 30 MIN: CPT | Mod: ,,, | Performed by: INTERNAL MEDICINE

## 2023-01-09 NOTE — PROGRESS NOTES
"Subjective:      Patient ID: Edwin Boyer is a 88 y.o. female.    Chief Complaint: Follow-up and Pyelonephritis    HPI  Patient presents with her son today. She reports last weekend she started to feel fatigued and with productive cough, sx worsened with abdominal/back pain. She went to [facility], diagnosed with COVID 19 and also with bladder infection- treated with Keflex, still on this medication. Says today- energy improved, no cough, no congestion, appetite returning, sleep is good, no abdominal/back pain, no blood in urine reported today, no foul smell to urine, she is still frequently urinating at night about 3 times at night.   Health Maintenance Due   Topic Date Due    COVID-19 Vaccine (4 - Booster for Pfizer series) 01/09/2022     Review of Systems   Constitutional:  Negative for chills and fever.   HENT:  Negative for congestion, sinus pressure and sore throat.    Respiratory:  Negative for cough and shortness of breath.    Cardiovascular:  Negative for chest pain and palpitations.   Gastrointestinal:  Negative for abdominal pain and nausea.   Skin:  Negative for rash.   A comprehensive ROS was performed and is negative except as noted above.  Objective:     Vitals:    01/09/23 1153   BP: 138/72   BP Location: Left arm   Patient Position: Sitting   BP Method: Medium (Automatic)   Pulse: 66   Resp: 18   Temp: 98.3 °F (36.8 °C)   TempSrc: Temporal   SpO2: 97%   Weight: 64.8 kg (142 lb 12.8 oz)   Height: 5' 1" (1.549 m)     Physical Exam  Vitals and nursing note reviewed.   Constitutional:       General: She is not in acute distress.     Appearance: Normal appearance. She is not ill-appearing.   HENT:      Head: Normocephalic and atraumatic.   Cardiovascular:      Rate and Rhythm: Normal rate and regular rhythm.      Heart sounds: No murmur heard.  Pulmonary:      Effort: Pulmonary effort is normal.      Breath sounds: Normal breath sounds. No wheezing.   Abdominal:      General: Abdomen is flat. Bowel sounds " are normal. There is no distension.      Palpations: Abdomen is soft.      Tenderness: There is no abdominal tenderness. There is no right CVA tenderness, left CVA tenderness or guarding.   Musculoskeletal:      Cervical back: Neck supple.   Lymphadenopathy:      Cervical: No cervical adenopathy.   Neurological:      Mental Status: She is alert and oriented to person, place, and time.   Psychiatric:         Behavior: Behavior normal.     Assessment/Plan:     1. COVID-19  Patient is now out of her 5 day quarantine  Sx are improved significantly  She is improving- continue supportive care, hydration, rest  2. Cystitis  Stable, she is still on keflex  Plan to repeat UA once this abx is completed and we will see if she has cleared infection and blood  If not we will discuss next steps     Next OV 2/8/2023  No follow-ups on file.    I spent a total of 30 minutes on the day of the visit.This includes face to face time and non-face to face time preparing to see the patient (eg, review of tests), obtaining and/or reviewing separately obtained history, documenting clinical information in the electronic or other health record, independently interpreting results and communicating results to the patient/family/caregiver, or care coordinator.

## 2023-01-18 DIAGNOSIS — N30.90 CYSTITIS: Primary | ICD-10-CM

## 2023-01-21 LAB
APPEARANCE UR: CLEAR
BACTERIA #/AREA URNS HPF: ABNORMAL /[HPF]
BACTERIA UR CULT: ABNORMAL
BACTERIA UR CULT: ABNORMAL
BILIRUB UR QL STRIP: NEGATIVE
COLOR UR: YELLOW
CRYSTALS URNS MICRO: ABNORMAL
EPI CELLS #/AREA URNS HPF: >10 /HPF (ref 0–10)
GLUCOSE UR QL STRIP: NEGATIVE
HGB UR QL STRIP: NEGATIVE
KETONES UR QL STRIP: NEGATIVE
LEUKOCYTE ESTERASE UR QL STRIP: ABNORMAL
MICRO URNS: ABNORMAL
NITRITE UR QL STRIP: NEGATIVE
OTHER ANTIBIOTIC SUSC ISLT: ABNORMAL
PH UR STRIP: 6.5 [PH] (ref 5–7.5)
PROT UR QL STRIP: NEGATIVE
RBC #/AREA URNS HPF: ABNORMAL /HPF (ref 0–2)
SP GR UR STRIP: 1.01 (ref 1–1.03)
URINALYSIS REFLEX: ABNORMAL
UROBILINOGEN UR STRIP-MCNC: 0.2 MG/DL (ref 0.2–1)
WBC #/AREA URNS HPF: ABNORMAL /HPF (ref 0–5)

## 2023-01-23 ENCOUNTER — TELEPHONE (OUTPATIENT)
Dept: FAMILY MEDICINE | Facility: CLINIC | Age: 88
End: 2023-01-23
Payer: MEDICARE

## 2023-01-23 RX ORDER — ACETAMINOPHEN AND CODEINE PHOSPHATE 300; 30 MG/1; MG/1
1 TABLET ORAL DAILY PRN
Qty: 90 TABLET | Refills: 0 | Status: SHIPPED | OUTPATIENT
Start: 2023-01-23 | End: 2023-03-09

## 2023-01-23 RX ORDER — AMOXICILLIN AND CLAVULANATE POTASSIUM 875; 125 MG/1; MG/1
1 TABLET, FILM COATED ORAL EVERY 12 HOURS
Qty: 14 TABLET | Refills: 0 | Status: SHIPPED | OUTPATIENT
Start: 2023-01-23 | End: 2023-01-30

## 2023-01-23 NOTE — TELEPHONE ENCOUNTER
Please call patient- she continues to have UTI  Order for augmentin BID sent to her local pharmacy please start this  Thanks!

## 2023-01-23 NOTE — TELEPHONE ENCOUNTER
I have signed for the following orders AND/OR meds. Please notify the patient and ask the patient to schedule the testing and/or information about any medications that were sent.      Medications Ordered This Encounter   Medications    acetaminophen-codeine 300-30mg (TYLENOL-CODEINE #3) 300-30 mg Tab     Sig: Take 1 tablet by mouth daily as needed (pain).     Dispense:  90 tablet     Refill:  0     Quantity prescribed more than 7 day supply? Yes, quantity medically necessary     Order Specific Question:   I have reviewed the Prescription Drug Monitoring Program (PDMP) database for this patient prior to prescribing the above opioid medication     Answer:   Yes    amoxicillin-clavulanate 875-125mg (AUGMENTIN) 875-125 mg per tablet     Sig: Take 1 tablet by mouth every 12 (twelve) hours. for 7 days     Dispense:  14 tablet     Refill:  0     No orders of the defined types were placed in this encounter.

## 2023-02-08 ENCOUNTER — OFFICE VISIT (OUTPATIENT)
Dept: FAMILY MEDICINE | Facility: CLINIC | Age: 88
End: 2023-02-08
Payer: MEDICARE

## 2023-02-08 VITALS
OXYGEN SATURATION: 98 % | DIASTOLIC BLOOD PRESSURE: 70 MMHG | HEART RATE: 64 BPM | WEIGHT: 144 LBS | TEMPERATURE: 98 F | HEIGHT: 61 IN | BODY MASS INDEX: 27.19 KG/M2 | SYSTOLIC BLOOD PRESSURE: 120 MMHG | RESPIRATION RATE: 18 BRPM

## 2023-02-08 DIAGNOSIS — M19.90 OSTEOARTHRITIS, UNSPECIFIED OSTEOARTHRITIS TYPE, UNSPECIFIED SITE: ICD-10-CM

## 2023-02-08 DIAGNOSIS — N18.30 STAGE 3 CHRONIC KIDNEY DISEASE, UNSPECIFIED WHETHER STAGE 3A OR 3B CKD: ICD-10-CM

## 2023-02-08 DIAGNOSIS — E21.0 PRIMARY HYPERPARATHYROIDISM: ICD-10-CM

## 2023-02-08 DIAGNOSIS — G47.00 INSOMNIA, UNSPECIFIED TYPE: ICD-10-CM

## 2023-02-08 DIAGNOSIS — I10 HTN (HYPERTENSION), BENIGN: ICD-10-CM

## 2023-02-08 DIAGNOSIS — E03.9 HYPOTHYROIDISM, UNSPECIFIED TYPE: ICD-10-CM

## 2023-02-08 DIAGNOSIS — E83.52 HYPERCALCEMIA: Primary | ICD-10-CM

## 2023-02-08 PROCEDURE — 1101F PR PT FALLS ASSESS DOC 0-1 FALLS W/OUT INJ PAST YR: ICD-10-PCS | Mod: CPTII,,, | Performed by: INTERNAL MEDICINE

## 2023-02-08 PROCEDURE — 99214 PR OFFICE/OUTPT VISIT, EST, LEVL IV, 30-39 MIN: ICD-10-PCS | Mod: ,,, | Performed by: INTERNAL MEDICINE

## 2023-02-08 PROCEDURE — 1159F MED LIST DOCD IN RCRD: CPT | Mod: CPTII,,, | Performed by: INTERNAL MEDICINE

## 2023-02-08 PROCEDURE — 99214 OFFICE O/P EST MOD 30 MIN: CPT | Mod: ,,, | Performed by: INTERNAL MEDICINE

## 2023-02-08 PROCEDURE — 1160F RVW MEDS BY RX/DR IN RCRD: CPT | Mod: CPTII,,, | Performed by: INTERNAL MEDICINE

## 2023-02-08 PROCEDURE — 3288F FALL RISK ASSESSMENT DOCD: CPT | Mod: CPTII,,, | Performed by: INTERNAL MEDICINE

## 2023-02-08 PROCEDURE — 1159F PR MEDICATION LIST DOCUMENTED IN MEDICAL RECORD: ICD-10-PCS | Mod: CPTII,,, | Performed by: INTERNAL MEDICINE

## 2023-02-08 PROCEDURE — 1160F PR REVIEW ALL MEDS BY PRESCRIBER/CLIN PHARMACIST DOCUMENTED: ICD-10-PCS | Mod: CPTII,,, | Performed by: INTERNAL MEDICINE

## 2023-02-08 PROCEDURE — 1101F PT FALLS ASSESS-DOCD LE1/YR: CPT | Mod: CPTII,,, | Performed by: INTERNAL MEDICINE

## 2023-02-08 PROCEDURE — 3288F PR FALLS RISK ASSESSMENT DOCUMENTED: ICD-10-PCS | Mod: CPTII,,, | Performed by: INTERNAL MEDICINE

## 2023-02-08 RX ORDER — TRIAMCINOLONE ACETONIDE 1 MG/G
OINTMENT TOPICAL 2 TIMES DAILY
Qty: 453.6 G | Refills: 1 | Status: SHIPPED | OUTPATIENT
Start: 2023-02-08 | End: 2023-09-28

## 2023-02-08 RX ORDER — DULOXETIN HYDROCHLORIDE 60 MG/1
60 CAPSULE, DELAYED RELEASE ORAL EVERY MORNING
Qty: 30 CAPSULE | Refills: 11 | Status: ON HOLD | OUTPATIENT
Start: 2023-02-08 | End: 2024-01-08 | Stop reason: HOSPADM

## 2023-02-08 RX ORDER — TRAZODONE HYDROCHLORIDE 100 MG/1
100 TABLET ORAL NIGHTLY
Qty: 30 TABLET | Refills: 11 | Status: SHIPPED | OUTPATIENT
Start: 2023-02-08 | End: 2024-02-08

## 2023-02-08 RX ORDER — DOXYCYCLINE 100 MG/1
100 CAPSULE ORAL EVERY 12 HOURS
Qty: 14 CAPSULE | Refills: 0 | Status: SHIPPED | OUTPATIENT
Start: 2023-02-08 | End: 2023-02-15

## 2023-02-08 NOTE — PROGRESS NOTES
Subjective:      Patient ID: Edwin Boyer is a 88 y.o. female.    Chief Complaint: Follow-up, Hypertension, and Hyperlipidemia    HPI    Doing well with her son today in office  Main concern is her LUE- she has excoriations all over the RUE from scratching, several have become more red, now all scabbed over, she is pouring hydrogen peroxide on the LUE to avoid infection but now skin is more itchy and dry      Last wellness 8/8/2022     Hypercalcemia: has seen Dr. Hernandez before, was recommended to start sensipar, patient did not plan to f/u, she is compliant with medication, needs repeat calcium level  Hypothyroidism: synthroid 75 mcg po by daily (generic only); no hot/cold intolerance  HTN: compliant with medication  HLD: rosuvastatin 20 mg po daily , no myalgias, aspirin 81 mg po daily  GERD without esophagitis: sx controlled   urinary incontinence: worse; says oxybutynin did not help much, declines urology evaluation  OA: taking celebrex 200 mg po daily  Fibromyalgia: cymbalta 60 +30  mg daily, gabapentin 100 mg (two caps BID)  and tylenol #3 TID as needed refilled by me  Anxiety/Depression: Cymbalta is helping at higher dose- she follows Dr. Vásquez with psychology  Insomnia: Trazodone 100 mg po at bedtime  osteoporosis: noted on DEXA 8/2020,  *did not tolerate alendronate, GI issues  *says that she cannot afford Prolia  bilateral carpal tunnel syndrome: waking up at night with bilateral hand numbness  CKD 3: stable, does not drink much water    surgery history:  angiogram  breast cyst removal , L breast  complete hysterectomy, no cancer  bilateral knee replacements  appendectomy  c/s x 3      Rheumatologist: Dr. Shook  Cardiologist: Dr. Guidry  urologist: Dr. 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   HENT:  Negative for congestion, sinus pressure and sore throat.    Respiratory:  Negative for cough and shortness of breath.    Cardiovascular:  Negative for chest pain and palpitations.   Gastrointestinal:  Negative for abdominal pain and nausea.   Skin:  Negative for rash.   A comprehensive ROS was performed and is negative except as noted above.  Objective:     Vitals:    02/08/23 1139   BP: 120/70   BP Location: Left arm   Patient Position: Sitting   BP Method: Medium (Automatic)   Pulse: 64   Resp: 18   Temp: 97.9 °F (36.6 °C)   TempSrc: Oral   SpO2: 98%   Weight: 65.3 kg (144 lb)   Height: 5' 1" (1.549 m)     Physical Exam  Vitals and nursing note reviewed.   Constitutional:       General: She is not in acute distress.     Appearance: Normal appearance. She is not ill-appearing.   HENT:      Head: Normocephalic and atraumatic.   Cardiovascular:      Rate and Rhythm: Normal rate and regular rhythm.      Heart sounds: No murmur heard.  Pulmonary:      Effort: Pulmonary effort is normal.      Breath sounds: Normal breath sounds. No wheezing.   Musculoskeletal:      Cervical back: Neck supple.   Lymphadenopathy:      Cervical: No cervical adenopathy.   Skin:     Comments: Excoriation noted over the LUE below the elbow  Skin is dry   Neurological:      " Mental Status: She is alert and oriented to person, place, and time.   Psychiatric:         Behavior: Behavior normal.     Assessment/Plan:     1. Hypercalcemia  -     Comprehensive Metabolic Panel; Future; Expected date: 02/08/2023  -     Calcium, Ionized; Future; Expected date: 02/08/2023  Repeat calcium, continue sensipar for now  2. Primary hyperparathyroidism  Stable, check calcium levels now, continue sensipar  3. Stage 3 chronic kidney disease, unspecified whether stage 3a or 3b CKD  Stable, avoid nsaid and cox2 inhibitors, drink more water  4. Hypothyroidism, unspecified type  Stable, continue current levothyroxine  5. Osteoarthritis, unspecified osteoarthritis type, unspecified site  Stable, sx controlled,continue current Rx  6. Insomnia, unspecified type  Stable, ok to use trazodone 100 mg nightly prn insomnia  7. HTN (hypertension), benign  Stable, continue current Rx    Patient has some itching and also has a bad habit of scratching/picking her scabs, advised to try triamcinolone ointment BID and also aquaphor, avoid scratching. Try to keep arm covered for healing; if you find that some of the excoriations are hurting you or becoming more red, treat as cellulitis with Rx for doxycycline, printed and provided for you today.      Other orders  -     doxycycline (VIBRAMYCIN) 100 MG Cap; Take 1 capsule (100 mg total) by mouth every 12 (twelve) hours. for 7 days  Dispense: 14 capsule; Refill: 0  -     triamcinolone acetonide 0.1% (KENALOG) 0.1 % ointment; Apply topically 2 (two) times daily.  Dispense: 453.6 g; Refill: 1  -     DULoxetine (CYMBALTA) 60 MG capsule; Take 1 capsule (60 mg total) by mouth every morning.  Dispense: 30 capsule; Refill: 11  -     traZODone (DESYREL) 100 MG tablet; Take 1 tablet (100 mg total) by mouth every evening.  Dispense: 30 tablet; Refill: 11       Follow up in about 3 months (around 5/8/2023) for Follow Up - Chronic Conditions.    This includes face to face time and non-face  to face time preparing to see the patient (eg, review of tests), obtaining and/or reviewing separately obtained history, documenting clinical information in the electronic or other health record, independently interpreting results and communicating results to the patient/family/caregiver, or care coordinator.

## 2023-02-16 ENCOUNTER — TELEPHONE (OUTPATIENT)
Dept: FAMILY MEDICINE | Facility: CLINIC | Age: 88
End: 2023-02-16
Payer: MEDICARE

## 2023-02-16 DIAGNOSIS — R21 RASH: ICD-10-CM

## 2023-02-16 DIAGNOSIS — R30.0 DYSURIA: Primary | ICD-10-CM

## 2023-02-16 NOTE — TELEPHONE ENCOUNTER
Spoke to patient, they report increase it itching to upper left arm, started doxycycline last night.    Please call Dr. Moreira office with [facility] dermatology; please see if they would be able to squeeze patient in soon for this rash. I thought it was from patient having a tick picking the skin on the arm but now I'm not sure given how it's moving up. Please see if they can add patient on soon and call patient son back with a quick appointment time  Patient also reporting foul smelling urine, asked them to drop off sample, urine order placed, he will try to come back in the morning to drop off sample      thanks

## 2023-02-16 NOTE — TELEPHONE ENCOUNTER
----- Message from Yudith Sainz sent at 2/16/2023  8:31 AM CST -----  Regarding: call back  .Type:  Needs Medical Advice    Who Called: Pt's son Eliza  Symptoms (please be specific): cellulitis  How long has patient had these symptoms:    Pharmacy name and phone #:    Would the patient rather a call back or a response via [facility]? Call back  Best Call Back Number: [phone]  Additional Information: Pt's cellulitis has spread, did start 2nd round of antibiotics, pt's son would like for Dr Soto to F/U with him.

## 2023-02-20 ENCOUNTER — TELEPHONE (OUTPATIENT)
Dept: FAMILY MEDICINE | Facility: CLINIC | Age: 88
End: 2023-02-20
Payer: MEDICARE

## 2023-02-20 DIAGNOSIS — I10 HTN (HYPERTENSION), BENIGN: ICD-10-CM

## 2023-02-20 RX ORDER — METOPROLOL SUCCINATE 25 MG/1
25 TABLET, EXTENDED RELEASE ORAL 2 TIMES DAILY
Qty: 180 TABLET | Refills: 3 | Status: SHIPPED | OUTPATIENT
Start: 2023-02-20 | End: 2023-03-27 | Stop reason: SDUPTHER

## 2023-02-20 RX ORDER — AMLODIPINE BESYLATE 10 MG/1
10 TABLET ORAL DAILY
Qty: 90 TABLET | Refills: 3 | Status: SHIPPED | OUTPATIENT
Start: 2023-02-20 | End: 2023-12-08 | Stop reason: SDUPTHER

## 2023-02-20 NOTE — TELEPHONE ENCOUNTER
----- Message from Yudith Sainz sent at 2/20/2023  2:22 PM CST -----  Regarding: med refill  .Type:  RX Refill Request    Who Called: [facility]  Refill or New Rx:Refill  RX Name and Strength:metoprolol succinate (TOPROL-XL) 25 MG 24 hr tablet  How is the patient currently taking it? (ex. 1XDay):BID  Is this a 30 day or 90 day RX:180  Preferred Pharmacy with phone number:[facility] - [place], [place] - [address]  Local or Mail Order:Mail  Ordering Provider:Brittany  Would the patient rather a call back or a response via [facility]? Call back  Best Call Back Number:[phone]  Additional Information:     .Type:  RX Refill Request    Who Called: [facility]  Refill or New Rx:Refill  RX Name and Strength:amLODIPine (NORVASC) 10 MG tablet  How is the patient currently taking it? (ex. 1XDay):1xday  Is this a 30 day or 90 day RX:90  Preferred Pharmacy with phone number:[facility] - [place], [place] - [address]  Local or Mail Order:Mail  Ordering Provider:Brittany  Would the patient rather a call back or a response via [facility]? Call back  Best Call Back Number:[phone]  Additional Information:

## 2023-03-09 ENCOUNTER — TELEPHONE (OUTPATIENT)
Dept: FAMILY MEDICINE | Facility: CLINIC | Age: 88
End: 2023-03-09
Payer: MEDICARE

## 2023-03-09 NOTE — TELEPHONE ENCOUNTER
----- Message from Jie Romano sent at 3/9/2023 10:01 AM CST -----  Regarding: medication help  Type:  Needs Medical Advice    Who Called: pt's son Eliza Boyer    Pharmacy name and phone #:  [id number] - [place] - [address] AT [facility] [address]   Phone:  [phone]        Would the patient rather a call back or a response via [facility]? Call back  Best Call Back Number: [phone]  Additional Information: pt's son stated his mom say a psychiatrist on yesterday and was told to ask her doctor if she can change the medication acetaminophen-codeine 300-30mg (TYLENOL-CODEINE #3) 300-30 mg Tab.  It is altering her state of mind.  Please call son with response

## 2023-03-09 NOTE — TELEPHONE ENCOUNTER
I spoke to patient son Eliza over phone  Patient more up/down emotionally, had appt with psychiatry who says to hold tylenol #3, I agree, we will stop tylenol #3 and patient son to call if she does not improve, will need in person eval, ok to add on  thanks

## 2023-03-27 DIAGNOSIS — I10 HTN (HYPERTENSION), BENIGN: ICD-10-CM

## 2023-03-27 RX ORDER — METOPROLOL SUCCINATE 25 MG/1
25 TABLET, EXTENDED RELEASE ORAL 2 TIMES DAILY
Qty: 180 TABLET | Refills: 3 | Status: SHIPPED | OUTPATIENT
Start: 2023-03-27 | End: 2023-06-01 | Stop reason: SDUPTHER

## 2023-03-27 NOTE — TELEPHONE ENCOUNTER
I have signed for the following orders AND/OR meds. Please notify the patient and ask the patient to schedule the testing and/or information about any medications that were sent.      Medications Ordered This Encounter   Medications    metoprolol succinate (TOPROL-XL) 25 MG 24 hr tablet     Sig: Take 1 tablet (25 mg total) by mouth 2 (two) times daily.     Dispense:  180 tablet     Refill:  3     **Patient requests 90 days supply**     No orders of the defined types were placed in this encounter.

## 2023-03-27 NOTE — TELEPHONE ENCOUNTER
----- Message from Donna Lay sent at 3/27/2023  9:25 AM CDT -----  .Type:  RX Refill Request    Who Called: pt  Refill or New Rx:refill  RX Name and Strength:metoprolol succinate (TOPROL-XL) 25 MG 24 hr tablet  How is the patient currently tmetoprolol [id number]):2xday  Is this a 30 day or 90 day RX:90  Preferred Pharmacy with phone number:[facility]  Local or Mail Order:local  Ordering Provider:Brittany  Would the patient rather a call back or a response via [facility]? C/b  Best Call Back Number:[phone]  Additional Information: pt said she is completely out of her blood pressure meds   .Type:  Needs Medical Advice    Who Called: pt  Symptoms (please be specific): sneezing no cough   How long has patient had these symptoms:  since the weekend  Pharmacy name and phone #:  [facility]  Would the patient rather a call back or a response via [facility]? C/b  Best Call Back Number:   Additional Information:

## 2023-05-05 ENCOUNTER — TELEPHONE (OUTPATIENT)
Dept: FAMILY MEDICINE | Facility: CLINIC | Age: 88
End: 2023-05-05
Payer: MEDICARE

## 2023-05-06 ENCOUNTER — LAB VISIT (OUTPATIENT)
Dept: LAB | Facility: HOSPITAL | Age: 88
End: 2023-05-06
Attending: INTERNAL MEDICINE
Payer: MEDICARE

## 2023-05-06 DIAGNOSIS — N30.90 CYSTITIS: ICD-10-CM

## 2023-05-06 DIAGNOSIS — E83.52 HYPERCALCEMIA: ICD-10-CM

## 2023-05-06 LAB
ALBUMIN SERPL-MCNC: 3.8 G/DL (ref 3.4–4.8)
ALBUMIN/GLOB SERPL: 1.4 RATIO (ref 1.1–2)
ALP SERPL-CCNC: 58 UNIT/L (ref 40–150)
ALT SERPL-CCNC: 8 UNIT/L (ref 0–55)
APPEARANCE UR: ABNORMAL
AST SERPL-CCNC: 13 UNIT/L (ref 5–34)
BACTERIA #/AREA URNS AUTO: ABNORMAL /HPF
BILIRUB UR QL STRIP.AUTO: NEGATIVE MG/DL
BILIRUBIN DIRECT+TOT PNL SERPL-MCNC: 0.7 MG/DL
BUN SERPL-MCNC: 18 MG/DL (ref 9.8–20.1)
CALCIUM SERPL-MCNC: 10.7 MG/DL (ref 8.4–10.2)
CHLORIDE SERPL-SCNC: 103 MMOL/L (ref 98–107)
CO2 SERPL-SCNC: 28 MMOL/L (ref 23–31)
COLOR UR AUTO: YELLOW
CREAT SERPL-MCNC: 0.9 MG/DL (ref 0.55–1.02)
GFR SERPLBLD CREATININE-BSD FMLA CKD-EPI: >60 MLS/MIN/1.73/M2
GLOBULIN SER-MCNC: 2.7 GM/DL (ref 2.4–3.5)
GLUCOSE SERPL-MCNC: 90 MG/DL (ref 82–115)
GLUCOSE UR QL STRIP.AUTO: NEGATIVE MG/DL
KETONES UR QL STRIP.AUTO: NEGATIVE MG/DL
LEUKOCYTE ESTERASE UR QL STRIP.AUTO: ABNORMAL UNIT/L
NITRITE UR QL STRIP.AUTO: POSITIVE
PH UR STRIP.AUTO: 6 [PH]
POTASSIUM SERPL-SCNC: 4.8 MMOL/L (ref 3.5–5.1)
PROT SERPL-MCNC: 6.5 GM/DL (ref 5.8–7.6)
PROT UR QL STRIP.AUTO: NEGATIVE MG/DL
RBC #/AREA URNS AUTO: ABNORMAL /HPF
RBC UR QL AUTO: ABNORMAL UNIT/L
SODIUM SERPL-SCNC: 136 MMOL/L (ref 136–145)
SP GR UR STRIP.AUTO: 1.01
SQUAMOUS #/AREA URNS AUTO: ABNORMAL /HPF
UROBILINOGEN UR STRIP-ACNC: 0.2 MG/DL
WBC #/AREA URNS AUTO: ABNORMAL /HPF

## 2023-05-06 PROCEDURE — 81001 URINALYSIS AUTO W/SCOPE: CPT

## 2023-05-06 PROCEDURE — 80053 COMPREHEN METABOLIC PANEL: CPT

## 2023-05-06 PROCEDURE — 82330 ASSAY OF CALCIUM: CPT | Mod: 90

## 2023-05-06 PROCEDURE — 36415 COLL VENOUS BLD VENIPUNCTURE: CPT

## 2023-05-06 PROCEDURE — 87186 SC STD MICRODIL/AGAR DIL: CPT

## 2023-05-06 PROCEDURE — 87088 URINE BACTERIA CULTURE: CPT

## 2023-05-08 LAB
BACTERIA UR CULT: ABNORMAL
CA-I ADJ PH7.4 SERPL ISE-MCNC: 5.79 MG/DL (ref 4.57–5.43)

## 2023-05-08 RX ORDER — NITROFURANTOIN 25; 75 MG/1; MG/1
100 CAPSULE ORAL 2 TIMES DAILY
Qty: 10 CAPSULE | Refills: 0 | Status: SHIPPED | OUTPATIENT
Start: 2023-05-08 | End: 2023-05-13

## 2023-05-09 ENCOUNTER — OFFICE VISIT (OUTPATIENT)
Dept: FAMILY MEDICINE | Facility: CLINIC | Age: 88
End: 2023-05-09
Payer: MEDICARE

## 2023-05-09 VITALS
BODY MASS INDEX: 27.43 KG/M2 | HEART RATE: 66 BPM | RESPIRATION RATE: 16 BRPM | SYSTOLIC BLOOD PRESSURE: 130 MMHG | TEMPERATURE: 98 F | DIASTOLIC BLOOD PRESSURE: 80 MMHG | OXYGEN SATURATION: 96 % | WEIGHT: 145.31 LBS | HEIGHT: 61 IN

## 2023-05-09 DIAGNOSIS — G45.9 TRANSIENT CEREBRAL ISCHEMIA, UNSPECIFIED TYPE: Primary | ICD-10-CM

## 2023-05-09 DIAGNOSIS — N39.0 URINARY TRACT INFECTION WITHOUT HEMATURIA, SITE UNSPECIFIED: ICD-10-CM

## 2023-05-09 DIAGNOSIS — E83.52 HYPERCALCEMIA: ICD-10-CM

## 2023-05-09 PROCEDURE — 1101F PR PT FALLS ASSESS DOC 0-1 FALLS W/OUT INJ PAST YR: ICD-10-PCS | Mod: CPTII,,, | Performed by: INTERNAL MEDICINE

## 2023-05-09 PROCEDURE — 1101F PT FALLS ASSESS-DOCD LE1/YR: CPT | Mod: CPTII,,, | Performed by: INTERNAL MEDICINE

## 2023-05-09 PROCEDURE — 1160F RVW MEDS BY RX/DR IN RCRD: CPT | Mod: CPTII,,, | Performed by: INTERNAL MEDICINE

## 2023-05-09 PROCEDURE — 1159F PR MEDICATION LIST DOCUMENTED IN MEDICAL RECORD: ICD-10-PCS | Mod: CPTII,,, | Performed by: INTERNAL MEDICINE

## 2023-05-09 PROCEDURE — 99214 OFFICE O/P EST MOD 30 MIN: CPT | Mod: ,,, | Performed by: INTERNAL MEDICINE

## 2023-05-09 PROCEDURE — 1160F PR REVIEW ALL MEDS BY PRESCRIBER/CLIN PHARMACIST DOCUMENTED: ICD-10-PCS | Mod: CPTII,,, | Performed by: INTERNAL MEDICINE

## 2023-05-09 PROCEDURE — 3288F FALL RISK ASSESSMENT DOCD: CPT | Mod: CPTII,,, | Performed by: INTERNAL MEDICINE

## 2023-05-09 PROCEDURE — 3288F PR FALLS RISK ASSESSMENT DOCUMENTED: ICD-10-PCS | Mod: CPTII,,, | Performed by: INTERNAL MEDICINE

## 2023-05-09 PROCEDURE — 99214 PR OFFICE/OUTPT VISIT, EST, LEVL IV, 30-39 MIN: ICD-10-PCS | Mod: ,,, | Performed by: INTERNAL MEDICINE

## 2023-05-09 PROCEDURE — 1159F MED LIST DOCD IN RCRD: CPT | Mod: CPTII,,, | Performed by: INTERNAL MEDICINE

## 2023-05-09 RX ORDER — CINACALCET 30 MG/1
30 TABLET, FILM COATED ORAL DAILY
Qty: 30 TABLET | Refills: 3 | Status: SHIPPED | OUTPATIENT
Start: 2023-05-09 | End: 2023-09-07 | Stop reason: SDUPTHER

## 2023-05-09 NOTE — PROGRESS NOTES
Subjective:      Patient ID: Edwin Boyer is a 88 y.o. female.    Chief Complaint: Follow-up, Hypertension, and Urinary Tract Infection    HPI  3 month f/u visit    Patient reports more fatigue over past few days, found to have UTI on labs, started treatment, beginning to feel better. About 1-2 weeks ago, described in her USOH when she felt a heaviness over the R eyebrow and some tingling, lasted for a few minutes went again, came back again the next day. She has no rash. No new vision pain, she wanted to know what that was.   Recently did echo, has f/u with Dr. Guidry in June 2023.     Last wellness 8/8/2022     Hypercalcemia: has seen Dr. Hernandez before, was recommended to start sensipar, patient did not plan to f/u, she has not restarted her sensipar   Hypothyroidism: synthroid 75 mcg po by daily (generic only); no hot/cold intolerance  HTN: compliant with medication  HLD: rosuvastatin 20 mg po daily , no myalgias, aspirin 81 mg po daily  GERD without esophagitis: sx controlled   urinary incontinence: worse; says oxybutynin did not help much, declines urology evaluation  OA: taking celebrex 200 mg po daily  Fibromyalgia: cymbalta 60 +30  mg daily, gabapentin 100 mg (two caps BID)  Anxiety/Depression: Cymbalta is helping at higher dose- she follows Dr. Vásquez with psychology  Insomnia: Trazodone 100 mg po at bedtime  osteoporosis: noted on DEXA 8/2020,  *did not tolerate alendronate, GI issues  *says that she cannot afford Prolia  bilateral carpal tunnel syndrome: waking up at night with bilateral hand numbness  CKD 3: stable, does not drink much water    surgery history:  angiogram  breast cyst removal , L breast  complete hysterectomy, no cancer  bilateral knee replacements  appendectomy  c/s x 3      Rheumatologist: Dr. Shook  Cardiologist: Dr. Guidry  urologist: Dr. 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 Negative for chills and fever.   Gastrointestinal:  Negative for abdominal pain.   Neurological:  Negative for speech difficulty, weakness, light-headedness, numbness and headaches.     Objective:     Vitals:    05/09/23 1133 05/09/23 1229   BP: (!) 142/68 130/80   BP Location: Left arm    Patient Position: Sitting    BP Method: Medium (Automatic)    Pulse: 66    Resp: 16    Temp: 98.4 °F (36.9 °C)    TempSrc: Temporal    SpO2: 96%    Weight: 65.9 kg (145 lb 4.8 oz)    Height: 5' 1" (1.549 m)      Physical Exam  Vitals and nursing note reviewed.   Constitutional:       General: She is not in acute distress.     Appearance: Normal appearance. She is not ill-appearing.   Eyes:      Pupils: Pupils are equal, round, and reactive to light.   Cardiovascular:      Rate and Rhythm: Normal rate and regular rhythm.      Heart sounds: Murmur heard.   Pulmonary:      Effort: Pulmonary effort is normal.      Breath sounds: Normal breath sounds. No wheezing.   Musculoskeletal:      Right lower leg: No edema.      Left lower leg: No edema.   Neurological:      Mental Status: She is alert. Mental status is at baseline.   Psychiatric:         Behavior: Behavior normal. "     Assessment/Plan:     1. Transient cerebral ischemia, unspecified type  -     MRI Brain Without Contrast; Future; Expected date: 05/09/2023  No focal deficit on exam today  She has no sx of heaviness or tingling today  I am concerned of patient description of heaviness over the R eye brow and tingling that could be TIA  Recommend MRI brain now  Continue aspirin and statin  We discussed signs/sx of stroke and if sx return to call 911 for ED evaluation  2. Urinary tract infection without hematuria, site unspecified  Continue macrobid 100 mg po BID  3. Hypercalcemia  Restart sensipar per endocrine recs  Other orders  -     cinacalcet (SENSIPAR) 30 MG Tab; Take 1 tablet (30 mg total) by mouth once daily.  Dispense: 30 tablet; Refill: 3       Follow up in about 3 months (around 8/9/2023) for Medicare Wellness.    This includes face to face time and non-face to face time preparing to see the patient (eg, review of tests), obtaining and/or reviewing separately obtained history, documenting clinical information in the electronic or other health record, independently interpreting results and communicating results to the patient/family/caregiver, or care coordinator.

## 2023-06-01 ENCOUNTER — TELEPHONE (OUTPATIENT)
Dept: FAMILY MEDICINE | Facility: CLINIC | Age: 88
End: 2023-06-01
Payer: MEDICARE

## 2023-06-01 DIAGNOSIS — I10 HTN (HYPERTENSION), BENIGN: ICD-10-CM

## 2023-06-01 RX ORDER — METOPROLOL SUCCINATE 25 MG/1
25 TABLET, EXTENDED RELEASE ORAL 2 TIMES DAILY
Qty: 180 TABLET | Refills: 3 | Status: SHIPPED | OUTPATIENT
Start: 2023-06-01 | End: 2023-06-05 | Stop reason: SDUPTHER

## 2023-06-01 NOTE — TELEPHONE ENCOUNTER
----- Message from April Stewart sent at 6/1/2023 11:05 AM CDT -----  Regarding: med refill  .Type:  RX Refill Request    Who Called:  patient  Refill or New Rx: refill  RX Name and Strength: metoprolol succinate (TOPROL-XL) 25 MG 24 hr tablet  How is the patient currently taking it? (ex. 1XDay): 2x daily  Is this a 30 day or 90 day RX: 180  Preferred Pharmacy with phone number: [facility] [address]  Local or Mail Order: mail  Ordering Provider: Brittany  Would the patient rather a call back or a response via [facility]? Call back  Best Call Back Number: [phone]  Additional Information:  patient would like a refill on medication.

## 2023-06-05 ENCOUNTER — TELEPHONE (OUTPATIENT)
Dept: FAMILY MEDICINE | Facility: CLINIC | Age: 88
End: 2023-06-05
Payer: MEDICARE

## 2023-06-05 DIAGNOSIS — I10 HTN (HYPERTENSION), BENIGN: ICD-10-CM

## 2023-06-05 RX ORDER — METOPROLOL SUCCINATE 25 MG/1
25 TABLET, EXTENDED RELEASE ORAL 2 TIMES DAILY
Qty: 30 TABLET | Refills: 0 | Status: SHIPPED | OUTPATIENT
Start: 2023-06-05 | End: 2023-12-08 | Stop reason: SDUPTHER

## 2023-06-05 NOTE — TELEPHONE ENCOUNTER
----- Message from Brianne Alejo sent at 6/5/2023  9:20 AM CDT -----  Regarding: med adv  Type:  Needs Medical Advice    Who Called: Eliza, patient's son  Pharmacy name and phone #:  [facility] [address]  Would the patient rather a call back or a response via [facility]?   Best Call Back Number: [phone]  Additional Information: patient is out of metoprolol succinate (TOPROL-XL) 25 MG 24 hr tablet that has been refilled through mail order and Eliza would like to know if a small quantity for a weeks time can be sent in at the local pharmacy? Please call Eliza back to confirm.

## 2023-06-06 NOTE — TELEPHONE ENCOUNTER
I have signed for the following orders AND/OR meds. Please notify the patient and ask the patient to schedule the testing and/or information about any medications that were sent.      Medications Ordered This Encounter   Medications    metoprolol succinate (TOPROL-XL) 25 MG 24 hr tablet     Sig: Take 1 tablet (25 mg total) by mouth 2 (two) times daily.     Dispense:  30 tablet     Refill:  0     **Patient requests 90 days supply**     No orders of the defined types were placed in this encounter.

## 2023-06-28 DIAGNOSIS — I10 HTN (HYPERTENSION), BENIGN: Primary | ICD-10-CM

## 2023-06-28 RX ORDER — LISINOPRIL 10 MG/1
10 TABLET ORAL DAILY
Qty: 30 TABLET | Refills: 11 | Status: SHIPPED | OUTPATIENT
Start: 2023-06-28 | End: 2023-08-29

## 2023-07-05 ENCOUNTER — TELEPHONE (OUTPATIENT)
Dept: FAMILY MEDICINE | Facility: CLINIC | Age: 88
End: 2023-07-05
Payer: MEDICARE

## 2023-07-05 NOTE — TELEPHONE ENCOUNTER
----- Message from Claire Prasad sent at 7/5/2023  9:41 AM CDT -----  Regarding: MRI brain  I just wanted to let you know that I spoke to the patient and she is refusing to have the MRI of her brain at this time. Patient said that she has decided to not have the scan. I canceled the orders and wanted to let you know.

## 2023-07-24 ENCOUNTER — TELEPHONE (OUTPATIENT)
Dept: FAMILY MEDICINE | Facility: CLINIC | Age: 88
End: 2023-07-24
Payer: MEDICARE

## 2023-07-24 DIAGNOSIS — E03.9 HYPOTHYROIDISM, UNSPECIFIED TYPE: ICD-10-CM

## 2023-07-24 DIAGNOSIS — E03.9 HYPOTHYROIDISM, UNSPECIFIED TYPE: Primary | ICD-10-CM

## 2023-07-24 RX ORDER — LEVOTHYROXINE SODIUM 75 UG/1
75 TABLET ORAL DAILY
Qty: 90 TABLET | Refills: 3 | Status: SHIPPED | OUTPATIENT
Start: 2023-07-24 | End: 2023-07-24 | Stop reason: SDUPTHER

## 2023-07-24 RX ORDER — LEVOTHYROXINE SODIUM 75 UG/1
75 TABLET ORAL DAILY
Qty: 90 TABLET | Refills: 3 | Status: ON HOLD | OUTPATIENT
Start: 2023-07-24 | End: 2024-01-22 | Stop reason: HOSPADM

## 2023-07-24 NOTE — TELEPHONE ENCOUNTER
----- Message from Lili Pineda sent at 7/24/2023 10:10 AM CDT -----  Regarding: RX Refill Request  Type:  RX Refill Request    Who Called: pt  Refill or New Rx:  RX Name and Strength:levothyroxine (SYNTHROID) 75 MCG tablet  How is the patient currently taking it? (ex. 1XDay): 1x  Is this a 30 day or 90 day RX: 90  [id number] [place] [facility] [address] AT [facility] [address]  Local or Mail Order:  Ordering Provider: Dr. Soto  Would the patient rather a call back or a response via [facility]?   Best Call Back Number:[phone]  Additional Information: pt called to have medication refilled

## 2023-08-29 ENCOUNTER — OFFICE VISIT (OUTPATIENT)
Dept: FAMILY MEDICINE | Facility: CLINIC | Age: 88
End: 2023-08-29
Payer: MEDICARE

## 2023-08-29 ENCOUNTER — LAB VISIT (OUTPATIENT)
Dept: LAB | Facility: HOSPITAL | Age: 88
End: 2023-08-29
Attending: INTERNAL MEDICINE
Payer: MEDICARE

## 2023-08-29 VITALS
DIASTOLIC BLOOD PRESSURE: 70 MMHG | HEART RATE: 70 BPM | WEIGHT: 141.19 LBS | RESPIRATION RATE: 16 BRPM | SYSTOLIC BLOOD PRESSURE: 120 MMHG | OXYGEN SATURATION: 97 % | TEMPERATURE: 98 F | BODY MASS INDEX: 26.66 KG/M2 | HEIGHT: 61 IN

## 2023-08-29 DIAGNOSIS — E78.5 HYPERLIPIDEMIA, UNSPECIFIED HYPERLIPIDEMIA TYPE: ICD-10-CM

## 2023-08-29 DIAGNOSIS — E03.9 HYPOTHYROIDISM, UNSPECIFIED TYPE: ICD-10-CM

## 2023-08-29 DIAGNOSIS — N18.30 STAGE 3 CHRONIC KIDNEY DISEASE, UNSPECIFIED WHETHER STAGE 3A OR 3B CKD: ICD-10-CM

## 2023-08-29 DIAGNOSIS — I10 HTN (HYPERTENSION), BENIGN: ICD-10-CM

## 2023-08-29 DIAGNOSIS — E03.9 HYPOTHYROIDISM, UNSPECIFIED TYPE: Primary | ICD-10-CM

## 2023-08-29 LAB
ALBUMIN SERPL-MCNC: 4.2 G/DL (ref 3.4–4.8)
ALBUMIN/GLOB SERPL: 1.4 RATIO (ref 1.1–2)
ALP SERPL-CCNC: 63 UNIT/L (ref 40–150)
ALT SERPL-CCNC: 14 UNIT/L (ref 0–55)
APPEARANCE UR: ABNORMAL
AST SERPL-CCNC: 18 UNIT/L (ref 5–34)
BACTERIA #/AREA URNS AUTO: ABNORMAL /HPF
BASOPHILS # BLD AUTO: 0.05 X10(3)/MCL
BASOPHILS NFR BLD AUTO: 0.5 %
BILIRUB SERPL-MCNC: 0.7 MG/DL
BILIRUB UR QL STRIP.AUTO: NEGATIVE
BUN SERPL-MCNC: 16.7 MG/DL (ref 9.8–20.1)
CALCIUM SERPL-MCNC: 9.7 MG/DL (ref 8.4–10.2)
CHLORIDE SERPL-SCNC: 99 MMOL/L (ref 98–107)
CHOLEST SERPL-MCNC: 146 MG/DL
CHOLEST/HDLC SERPL: 4 {RATIO} (ref 0–5)
CO2 SERPL-SCNC: 29 MMOL/L (ref 23–31)
COLOR UR: YELLOW
CREAT SERPL-MCNC: 1.04 MG/DL (ref 0.55–1.02)
EOSINOPHIL # BLD AUTO: 0.19 X10(3)/MCL (ref 0–0.9)
EOSINOPHIL NFR BLD AUTO: 1.9 %
ERYTHROCYTE [DISTWIDTH] IN BLOOD BY AUTOMATED COUNT: 11.9 % (ref 11.5–17)
GFR SERPLBLD CREATININE-BSD FMLA CKD-EPI: 52 MLS/MIN/1.73/M2
GLOBULIN SER-MCNC: 3.1 GM/DL (ref 2.4–3.5)
GLUCOSE SERPL-MCNC: 95 MG/DL (ref 82–115)
GLUCOSE UR QL STRIP.AUTO: NEGATIVE
HCT VFR BLD AUTO: 47.1 % (ref 37–47)
HDLC SERPL-MCNC: 41 MG/DL (ref 35–60)
HGB BLD-MCNC: 15.7 G/DL (ref 12–16)
IMM GRANULOCYTES # BLD AUTO: 0.03 X10(3)/MCL (ref 0–0.04)
IMM GRANULOCYTES NFR BLD AUTO: 0.3 %
KETONES UR QL STRIP.AUTO: NEGATIVE
LDLC SERPL CALC-MCNC: 73 MG/DL (ref 50–140)
LEUKOCYTE ESTERASE UR QL STRIP.AUTO: ABNORMAL
LYMPHOCYTES # BLD AUTO: 2.39 X10(3)/MCL (ref 0.6–4.6)
LYMPHOCYTES NFR BLD AUTO: 23.9 %
MCH RBC QN AUTO: 32.6 PG (ref 27–31)
MCHC RBC AUTO-ENTMCNC: 33.3 G/DL (ref 33–36)
MCV RBC AUTO: 97.7 FL (ref 80–94)
MONOCYTES # BLD AUTO: 0.86 X10(3)/MCL (ref 0.1–1.3)
MONOCYTES NFR BLD AUTO: 8.6 %
NEUTROPHILS # BLD AUTO: 6.46 X10(3)/MCL (ref 2.1–9.2)
NEUTROPHILS NFR BLD AUTO: 64.8 %
NITRITE UR QL STRIP.AUTO: POSITIVE
NRBC BLD AUTO-RTO: 0 %
PH UR STRIP.AUTO: 6 [PH]
PLATELET # BLD AUTO: 260 X10(3)/MCL (ref 130–400)
PMV BLD AUTO: 9.3 FL (ref 7.4–10.4)
POTASSIUM SERPL-SCNC: 5.2 MMOL/L (ref 3.5–5.1)
PROT SERPL-MCNC: 7.3 GM/DL (ref 5.8–7.6)
PROT UR QL STRIP.AUTO: NEGATIVE
RBC # BLD AUTO: 4.82 X10(6)/MCL (ref 4.2–5.4)
RBC #/AREA URNS AUTO: ABNORMAL /HPF
RBC UR QL AUTO: NEGATIVE
SODIUM SERPL-SCNC: 140 MMOL/L (ref 136–145)
SP GR UR STRIP.AUTO: 1.01 (ref 1–1.03)
SQUAMOUS #/AREA URNS AUTO: ABNORMAL /HPF
TRIGL SERPL-MCNC: 160 MG/DL (ref 37–140)
TSH SERPL-ACNC: 0.6 UIU/ML (ref 0.35–4.94)
UROBILINOGEN UR STRIP-ACNC: 1
VLDLC SERPL CALC-MCNC: 32 MG/DL
WBC # SPEC AUTO: 9.98 X10(3)/MCL (ref 4.5–11.5)
WBC #/AREA URNS AUTO: ABNORMAL /HPF

## 2023-08-29 PROCEDURE — 1160F RVW MEDS BY RX/DR IN RCRD: CPT | Mod: CPTII,,, | Performed by: INTERNAL MEDICINE

## 2023-08-29 PROCEDURE — 1101F PR PT FALLS ASSESS DOC 0-1 FALLS W/OUT INJ PAST YR: ICD-10-PCS | Mod: CPTII,,, | Performed by: INTERNAL MEDICINE

## 2023-08-29 PROCEDURE — 81001 URINALYSIS AUTO W/SCOPE: CPT

## 2023-08-29 PROCEDURE — 3288F PR FALLS RISK ASSESSMENT DOCUMENTED: ICD-10-PCS | Mod: CPTII,,, | Performed by: INTERNAL MEDICINE

## 2023-08-29 PROCEDURE — 80061 LIPID PANEL: CPT

## 2023-08-29 PROCEDURE — 85025 COMPLETE CBC W/AUTO DIFF WBC: CPT

## 2023-08-29 PROCEDURE — 84443 ASSAY THYROID STIM HORMONE: CPT

## 2023-08-29 PROCEDURE — 1101F PT FALLS ASSESS-DOCD LE1/YR: CPT | Mod: CPTII,,, | Performed by: INTERNAL MEDICINE

## 2023-08-29 PROCEDURE — G0439 PR MEDICARE ANNUAL WELLNESS SUBSEQUENT VISIT: ICD-10-PCS | Mod: ,,, | Performed by: INTERNAL MEDICINE

## 2023-08-29 PROCEDURE — 36415 COLL VENOUS BLD VENIPUNCTURE: CPT

## 2023-08-29 PROCEDURE — G0439 PPPS, SUBSEQ VISIT: HCPCS | Mod: ,,, | Performed by: INTERNAL MEDICINE

## 2023-08-29 PROCEDURE — 87077 CULTURE AEROBIC IDENTIFY: CPT

## 2023-08-29 PROCEDURE — 1159F PR MEDICATION LIST DOCUMENTED IN MEDICAL RECORD: ICD-10-PCS | Mod: CPTII,,, | Performed by: INTERNAL MEDICINE

## 2023-08-29 PROCEDURE — 1160F PR REVIEW ALL MEDS BY PRESCRIBER/CLIN PHARMACIST DOCUMENTED: ICD-10-PCS | Mod: CPTII,,, | Performed by: INTERNAL MEDICINE

## 2023-08-29 PROCEDURE — 1159F MED LIST DOCD IN RCRD: CPT | Mod: CPTII,,, | Performed by: INTERNAL MEDICINE

## 2023-08-29 PROCEDURE — 3288F FALL RISK ASSESSMENT DOCD: CPT | Mod: CPTII,,, | Performed by: INTERNAL MEDICINE

## 2023-08-29 PROCEDURE — 87088 URINE BACTERIA CULTURE: CPT

## 2023-08-29 PROCEDURE — 80053 COMPREHEN METABOLIC PANEL: CPT

## 2023-08-29 RX ORDER — LOSARTAN POTASSIUM 25 MG/1
12.5 TABLET ORAL DAILY
Qty: 45 TABLET | Refills: 3 | Status: SHIPPED | OUTPATIENT
Start: 2023-08-29 | End: 2024-08-28

## 2023-08-29 RX ORDER — PRAVASTATIN SODIUM 20 MG/1
20 TABLET ORAL DAILY
Status: ON HOLD | COMMUNITY
Start: 2023-06-13 | End: 2024-01-08 | Stop reason: HOSPADM

## 2023-08-29 NOTE — PROGRESS NOTES
Patient ID: [id number]     Chief Complaint: Medicare AWV      HPI:     Edwin Boyer is a 88 y.o. female here today for a Medicare Wellness.     Doing well today  Having some dry cough during day/night  Woke up a few times sweating, was cold stayed covered up and thinks its because of heavy blankets  She has lost a few more pounds, diet is unchanged overall, eating 2 meals a day, small amounts    Last wellness 8/8/2022     Hypercalcemia: has seen Dr. Hernandez before, was recommended to start sensipar, patient now taking this  Hypothyroidism: synthroid 75 mcg po by daily (generic only); no hot/cold intolerance  HTN: compliant with medication  HLD: pravastatin 20 mg po daily , no myalgias, aspirin 81 mg po daily  GERD without esophagitis: sx controlled   urinary incontinence: worse; says oxybutynin did not help much, declines urology evaluation  OA: taking celebrex 200 mg po daily  Fibromyalgia: cymbalta 60 +30  mg daily, gabapentin 100 mg (two caps BID)  Anxiety/Depression: Cymbalta is helping at higher dose- she follows Dr. Vásquez with psychology  Insomnia: Trazodone 100 mg po at bedtime  osteoporosis: noted on DEXA 8/2020,  *did not tolerate alendronate, GI issues  *says that she cannot afford Prolia  bilateral carpal tunnel syndrome: waking up at night with bilateral hand numbness  CKD 3: stable, does not drink much water    surgery history:  angiogram  breast cyst removal , L breast  complete hysterectomy, no cancer  bilateral knee replacements  appendectomy  c/s x 3      Rheumatologist: Dr. Shook  Cardiologist: Dr. Guidry  urologist: Dr. 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 Patient is not using additional opioids than prescribed. Patient is at low risk of substance abuse based on this opioid use history.       ----------------------------  Carpal tunnel syndrome, bilateral  Chronic back pain  Fibromyalgia  Osteoporosis     Past Surgical History:   Procedure Laterality Date    APPENDECTOMY       SECTION      COLONOSCOPY      HYSTERECTOMY      TOTAL KNEE ARTHROPLASTY         Review of patient's allergies indicates:   Allergen Reactions    Meperidine      Other reaction(s): EXCITATIVE TYPE PSYCHOSIS    Sulfamethoxazole        Outpatient Medications Marked as Taking for the 23 encounter (Office Visit) with Mariely Soto MD   Medication Sig Dispense Refill    amLODIPine (NORVASC) 10 MG tablet Take 1 tablet (10 mg total) by mouth once daily. 90 tablet 3    aspirin (ECOTRIN) 81 MG EC tablet TAKE 1 TABLET EVERY DAY 90 tablet 3    celecoxib (CELEBREX) 200 MG capsule TAKE 1 CAPSULE EVERY DAY 90 capsule 0    cinacalcet (SENSIPAR) 30 MG Tab Take 1 tablet (30 mg total) by mouth once daily. 30 tablet 3    DULoxetine (CYMBALTA) 30 MG capsule Take 30 mg by mouth once daily.      DULoxetine (CYMBALTA) 60 MG capsule Take 1 capsule (60 mg total) by mouth every morning. 30 capsule 11    gabapentin (NEURONTIN) 100 MG capsule Take 100 mg by mouth 2 (two) times daily.      levothyroxine (SYNTHROID) 75 MCG tablet Take 1 tablet (75 mcg total) by mouth once daily. 90 tablet 3    metoprolol succinate (TOPROL-XL) 25 MG 24 hr tablet Take 1 tablet  (25 mg total) by mouth 2 (two) times daily. 30 tablet 0    oxybutynin (DITROPAN-XL) 10 MG 24 hr tablet Take 10 mg by mouth once daily.      pravastatin (PRAVACHOL) 20 MG tablet Take 20 mg by mouth once daily.      traZODone (DESYREL) 100 MG tablet Take 1 tablet (100 mg total) by mouth every evening. 30 tablet 11    triamcinolone acetonide 0.1% (KENALOG) 0.1 % ointment Apply topically 2 (two) times daily. 453.6 g 1    [DISCONTINUED] lisinopriL 10 MG tablet Take 1 tablet (10 mg total) by mouth once daily. 30 tablet 11    [DISCONTINUED] rosuvastatin (CRESTOR) 20 MG tablet Take 1 tablet by mouth once daily.         Social History     Socioeconomic History    Marital status: Single   Tobacco Use    Smoking status: Never    Smokeless tobacco: Never   Substance and Sexual Activity    Drug use: Never        Family History   Family history unknown: Yes        Patient Care Team:  Mariely Soto MD as PCP - General (Internal Medicine)       Subjective:     Review of Systems   Constitutional:  Negative for fever.   Eyes:  Negative for pain.   Respiratory:  Negative for shortness of breath.    Cardiovascular:  Negative for chest pain.   Gastrointestinal:  Negative for abdominal pain.         Patient Reported Health Risk Assessment  What is your age?: 80 or older  Are you male or female?: Female  During the past four weeks, how much have you been bothered by emotional problems such as feeling anxious, depressed, irritable, sad, or downhearted and blue?: Slightly  During the past five weeks, has your physical and/or emotional health limited your social activities with family, friends, neighbors, or groups?: Slightly  During the past four weeks, how much bodily pain have you generally had?: Mild pain  During the past four weeks, was someone available to help if you needed and wanted help?: Yes, as much as I wanted  During the past four weeks, what was the hardest physical activity you could do for at least two minutes?:  Light  Can you get to places out of walking distance without help?  (For example, can you travel alone on buses or taxis, or drive your own car?): No  Can you go shopping for groceries or clothes without someone's help?: No  Can you prepare your own meals?: Yes  Can you do your own housework without help?: Yes  Because of any health problems, do you need the help of another person with your personal care needs such as eating, bathing, dressing, or getting around the house?: No  Can you handle your own money without help?: Yes  During the past four weeks, how would you rate your health in general?: Good  How have things been going for you during the past four weeks?: Pretty well  Are you having difficulties driving your car?: No  Do you always fasten your seat belt when you are in a car?: Yes, usually  How often in the past four weeks have you been bothered by falling or dizzy when standing up?: Seldom  How often in the past four weeks have you been bothered by sexual problems?: Never  How often in the past four weeks have you been bothered by trouble eating well?: Never  How often in the past four weeks have you been bothered by teeth or denture problems?: Never  How often in the past four weeks have you been bothered with problems using the telephone?: Never  How often in the past four weeks have you been bothered by tiredness or fatigue?: Often  Have you fallen two or more times in the past year?: No  Are you afraid of falling?: No  Are you a smoker?: No  During the past four weeks, how many drinks of wine, beer, or other alcoholic beverages did you have?: No alcohol at all  Do you exercise for about 20 minutes three or more days a week?: Yes, some of the time  Have you been given any information to help you with hazards in your house that might hurt you?: Yes  Have you been given any information to help you with keeping track of your medications?: Yes  How often do you have trouble taking medicines the way you've  "been told to take them?: I always take them as prescribed  How confident are you that you can control and manage most of your health problems?: Somewhat confident  What is your race? (Check all that apply.):     Objective:     /70 (BP Location: Left arm, Patient Position: Sitting, BP Method: Medium (Automatic))   Pulse 70   Temp 98.4 °F (36.9 °C) (Temporal)   Resp 16   Ht 5' 1" (1.549 m)   Wt 64 kg (141 lb 3.2 oz)   SpO2 97%   BMI 26.68 kg/m²     Physical Exam  Vitals and nursing note reviewed.   Constitutional:       General: She is not in acute distress.     Appearance: Normal appearance. She is not ill-appearing.   HENT:      Head: Normocephalic and atraumatic.      Right Ear: Tympanic membrane normal.      Left Ear: Tympanic membrane normal.      Nose: Nose normal. No congestion.      Mouth/Throat:      Mouth: Mucous membranes are moist.      Pharynx: No oropharyngeal exudate.   Eyes:      Pupils: Pupils are equal, round, and reactive to light.   Cardiovascular:      Rate and Rhythm: Normal rate and regular rhythm.   Pulmonary:      Effort: Pulmonary effort is normal.      Breath sounds: Normal breath sounds. No wheezing.   Musculoskeletal:      Cervical back: Neck supple.      Right lower leg: No edema.      Left lower leg: No edema.   Neurological:      Mental Status: She is alert.                No data to display                  8/29/2023    11:15 AM 5/9/2023    11:30 AM 2/8/2023    11:15 AM 1/9/2023    11:45 AM 11/8/2022    11:30 AM   Fall Risk Assessment - Outpatient   Mobility Status Ambulatory w/ assistance Ambulatory w/ assistance Ambulatory Ambulatory w/ assistance Ambulatory w/ assistance   Number of falls 0 0 0 0    Identified as fall risk True True False True            Depression Screening  Over the past two weeks, has the patient felt down, depressed, or hopeless?: No  Over the past two weeks, has the patient felt little interest or pleasure in doing things?: No  Functional " Ability/Safety Screening  Was the patient's timed Up & Go test unsteady or longer than 30 seconds?: No  Does the patient need help with phone, transportation, shopping, preparing meals, housework, laundry, meds, or managing money?: No  Does the patient's home have rugs in the hallway, lack grab bars in the bathroom, lack handrails on the stairs or have poor lighting?: No  Have you noticed any hearing difficulties?: No  Cognitive Function (Assessed through direct observation with due consideration of information obtained by way of patient reports and/or concerns raised by family, friends, caretakers, or others)    Does the patient repeat questions/statements in the same day?: No  Does the patient have trouble remembering the date, year, and time?: No  Does the patient have difficulty managing finances?: No  Does the patient have a decreased sense of direction?: No  Assessment/Plan:     1. Hypothyroidism, unspecified type  -     Comprehensive Metabolic Panel; Future; Expected date: 08/29/2023  -     Lipid Panel; Future; Expected date: 08/29/2023  -     CBC Auto Differential; Future; Expected date: 08/29/2023  -     Urinalysis; Future; Expected date: 08/29/2023  -     TSH; Future; Expected date: 08/29/2023  Stable, continue current Rx  Check TSH levels  Unsure if sweating is caused by this or not  2. Hyperlipidemia, unspecified hyperlipidemia type  -     Comprehensive Metabolic Panel; Future; Expected date: 08/29/2023  -     Lipid Panel; Future; Expected date: 08/29/2023  -     CBC Auto Differential; Future; Expected date: 08/29/2023  -     Urinalysis; Future; Expected date: 08/29/2023  -     TSH; Future; Expected date: 08/29/2023  Stable, continue statin therapy  3. HTN (hypertension), benign  -     Comprehensive Metabolic Panel; Future; Expected date: 08/29/2023  -     Lipid Panel; Future; Expected date: 08/29/2023  -     CBC Auto Differential; Future; Expected date: 08/29/2023  -     Urinalysis; Future; Expected  date: 08/29/2023  -     TSH; Future; Expected date: 08/29/2023  Stable, because of cough  Stop the lisinopril  Switch to losartan 12.5 mg po daily  4. Stage 3 chronic kidney disease, unspecified whether stage 3a or 3b CKD  -     Comprehensive Metabolic Panel; Future; Expected date: 08/29/2023  -     Lipid Panel; Future; Expected date: 08/29/2023  -     CBC Auto Differential; Future; Expected date: 08/29/2023  -     Urinalysis; Future; Expected date: 08/29/2023  -     TSH; Future; Expected date: 08/29/2023  Stable, avoid NSAID and cox2 inhibitors  Other orders  -     losartan (COZAAR) 25 MG tablet; Take 0.5 tablets (12.5 mg total) by mouth once daily.  Dispense: 45 tablet; Refill: 3           Medicare Annual Wellness and Personalized Prevention Plan:   Fall Risk + Home Safety + Hearing Impairment + Depression Screen + Opioid and Substance Abuse Screening + Cognitive Impairment Screen + Health Risk Assessment all reviewed.     Health Maintenance Topics with due status: Not Due       Topic Last Completion Date    TETANUS VACCINE 11/06/2019    Lipid Panel 09/02/2022    Influenza Vaccine 11/08/2022      The patient's Health Maintenance was reviewed and the following appears to be due at this time:   Health Maintenance Due   Topic Date Due    COVID-19 Vaccine (4 - Pfizer series) 01/09/2022       Advance Care Planning     Date: 08/29/2023  Education provided for patient regarding HCPOA and living will; advised patient to provide us copies of her completed documents          Follow up in about 3 months (around 11/29/2023) for Follow Up - Chronic Conditions. In addition to their scheduled follow up, the patient has also been instructed to follow up on as needed basis.

## 2023-08-31 ENCOUNTER — TELEPHONE (OUTPATIENT)
Dept: FAMILY MEDICINE | Facility: CLINIC | Age: 88
End: 2023-08-31
Payer: MEDICARE

## 2023-08-31 LAB — BACTERIA UR CULT: ABNORMAL

## 2023-08-31 RX ORDER — NITROFURANTOIN 25; 75 MG/1; MG/1
100 CAPSULE ORAL 2 TIMES DAILY
Qty: 14 CAPSULE | Refills: 0 | Status: SHIPPED | OUTPATIENT
Start: 2023-08-31 | End: 2023-09-07

## 2023-08-31 NOTE — TELEPHONE ENCOUNTER
Please let patient know she has UTI  Culture and sensitivity report reviewed  Please start Macrobid 100 mg PO BID x 7 days  Rx sent to pharmacy on file    Thanks     I have signed for the following orders AND/OR meds. Please notify the patient and ask the patient to schedule the testing and/or information about any medications that were sent.      Medications Ordered This Encounter   Medications    nitrofurantoin, macrocrystal-monohydrate, (MACROBID) 100 MG capsule     Sig: Take 1 capsule (100 mg total) by mouth 2 (two) times daily. for 7 days     Dispense:  14 capsule     Refill:  0     No orders of the defined types were placed in this encounter.

## 2023-08-31 NOTE — TELEPHONE ENCOUNTER
Spoke with pt  Results reviewed  Advised of recommendations and new meds  Also advised pt to be sure she is wiping from front to back when she uses the restroom and also to drink plenty water  Pt expressed understanding

## 2023-09-06 ENCOUNTER — TELEPHONE (OUTPATIENT)
Dept: FAMILY MEDICINE | Facility: CLINIC | Age: 88
End: 2023-09-06
Payer: MEDICARE

## 2023-09-07 DIAGNOSIS — E83.52 HYPERCALCEMIA: Primary | ICD-10-CM

## 2023-09-07 RX ORDER — CINACALCET 30 MG/1
30 TABLET, FILM COATED ORAL DAILY
Qty: 30 TABLET | Refills: 3 | Status: SHIPPED | OUTPATIENT
Start: 2023-09-07

## 2023-09-18 ENCOUNTER — TELEPHONE (OUTPATIENT)
Dept: FAMILY MEDICINE | Facility: CLINIC | Age: 88
End: 2023-09-18

## 2023-09-18 ENCOUNTER — OFFICE VISIT (OUTPATIENT)
Dept: FAMILY MEDICINE | Facility: CLINIC | Age: 88
End: 2023-09-18
Payer: MEDICARE

## 2023-09-18 VITALS
HEIGHT: 61 IN | TEMPERATURE: 97 F | BODY MASS INDEX: 26.22 KG/M2 | SYSTOLIC BLOOD PRESSURE: 130 MMHG | OXYGEN SATURATION: 98 % | RESPIRATION RATE: 19 BRPM | WEIGHT: 138.88 LBS | HEART RATE: 68 BPM | DIASTOLIC BLOOD PRESSURE: 60 MMHG

## 2023-09-18 DIAGNOSIS — Z23 NEED FOR INFLUENZA VACCINATION: Primary | ICD-10-CM

## 2023-09-18 DIAGNOSIS — G62.9 PERIPHERAL POLYNEUROPATHY: ICD-10-CM

## 2023-09-18 PROBLEM — G56.00 CARPAL TUNNEL SYNDROME: Status: ACTIVE | Noted: 2023-09-18

## 2023-09-18 PROCEDURE — 3288F FALL RISK ASSESSMENT DOCD: CPT | Mod: CPTII,,, | Performed by: NURSE PRACTITIONER

## 2023-09-18 PROCEDURE — 3288F PR FALLS RISK ASSESSMENT DOCUMENTED: ICD-10-PCS | Mod: CPTII,,, | Performed by: NURSE PRACTITIONER

## 2023-09-18 PROCEDURE — 90662 IIV NO PRSV INCREASED AG IM: CPT | Mod: ,,, | Performed by: NURSE PRACTITIONER

## 2023-09-18 PROCEDURE — 1159F PR MEDICATION LIST DOCUMENTED IN MEDICAL RECORD: ICD-10-PCS | Mod: CPTII,,, | Performed by: NURSE PRACTITIONER

## 2023-09-18 PROCEDURE — G0008 ADMIN INFLUENZA VIRUS VAC: HCPCS | Mod: ,,, | Performed by: NURSE PRACTITIONER

## 2023-09-18 PROCEDURE — 1101F PR PT FALLS ASSESS DOC 0-1 FALLS W/OUT INJ PAST YR: ICD-10-PCS | Mod: CPTII,,, | Performed by: NURSE PRACTITIONER

## 2023-09-18 PROCEDURE — 90662 FLU VACCINE - QUADRIVALENT - HIGH DOSE (65+) PRESERVATIVE FREE IM: ICD-10-PCS | Mod: ,,, | Performed by: NURSE PRACTITIONER

## 2023-09-18 PROCEDURE — G0008 FLU VACCINE - QUADRIVALENT - HIGH DOSE (65+) PRESERVATIVE FREE IM: ICD-10-PCS | Mod: ,,, | Performed by: NURSE PRACTITIONER

## 2023-09-18 PROCEDURE — 1126F AMNT PAIN NOTED NONE PRSNT: CPT | Mod: CPTII,,, | Performed by: NURSE PRACTITIONER

## 2023-09-18 PROCEDURE — 1126F PR PAIN SEVERITY QUANTIFIED, NO PAIN PRESENT: ICD-10-PCS | Mod: CPTII,,, | Performed by: NURSE PRACTITIONER

## 2023-09-18 PROCEDURE — 1159F MED LIST DOCD IN RCRD: CPT | Mod: CPTII,,, | Performed by: NURSE PRACTITIONER

## 2023-09-18 PROCEDURE — 99213 OFFICE O/P EST LOW 20 MIN: CPT | Mod: ,,, | Performed by: NURSE PRACTITIONER

## 2023-09-18 PROCEDURE — 1101F PT FALLS ASSESS-DOCD LE1/YR: CPT | Mod: CPTII,,, | Performed by: NURSE PRACTITIONER

## 2023-09-18 PROCEDURE — 99213 PR OFFICE/OUTPT VISIT, EST, LEVL III, 20-29 MIN: ICD-10-PCS | Mod: ,,, | Performed by: NURSE PRACTITIONER

## 2023-09-18 RX ORDER — GABAPENTIN 100 MG/1
100 CAPSULE ORAL 2 TIMES DAILY
Qty: 60 CAPSULE | Refills: 3 | Status: SHIPPED | OUTPATIENT
Start: 2023-09-18 | End: 2023-10-13 | Stop reason: SDUPTHER

## 2023-09-18 RX ORDER — FLUTICASONE PROPIONATE 50 MCG
2 SPRAY, SUSPENSION (ML) NASAL
COMMUNITY
Start: 2023-06-11 | End: 2023-12-29 | Stop reason: CLARIF

## 2023-09-18 NOTE — PROGRESS NOTES
Subjective:      Patient ID: Edwin Boyer is a 88 y.o. White female      Chief Complaint: Right Hand Numbness and Flu Vaccine       Past Medical History:   Diagnosis Date    Anxiety 6/6/2022    Carpal tunnel syndrome, bilateral     Chronic back pain     CKD (chronic kidney disease) stage 3, GFR 30-59 ml/min 6/6/2022    Fibromyalgia     GERD with esophagitis 6/6/2022    HTN (hypertension), benign 6/6/2022    Hyperlipidemia 6/6/2022    Hypothyroidism 6/6/2022    Insomnia 6/6/2022    OA (osteoarthritis) 6/6/2022    Osteoporosis     Primary hyperparathyroidism 2/8/2023    Urinary incontinence 8/8/2022        HPI  Presents to clinic for evaluation of right hand pain/numbness.  Son at bedside.      C/O of pain of right hand (finger) numbness/pain x 5 days ago.  After pain, states fingers started itching and then numbness started again.  Numbness is daily and constant.  Additionally, pt has documented hx of carpal tunnel and neck pain.  States she has not taken Gabapentin in six months; unsure of reasoning.  Denies any adverse effects while taking Gabapentin.  Denies any other problems.              Patient Care Team:  Mariely Soto MD as PCP - General (Internal Medicine)      Review of Systems   Constitutional: Negative.  Negative for appetite change, chills and fever.   HENT: Negative.     Eyes: Negative.  Negative for discharge and redness.   Respiratory: Negative.  Negative for shortness of breath.    Cardiovascular: Negative.  Negative for chest pain.   Gastrointestinal: Negative.    Endocrine: Negative.    Genitourinary: Negative.    Integumentary:  Negative.   Allergic/Immunologic: Negative.    Neurological:  Positive for numbness.   Psychiatric/Behavioral: Negative.     All other systems reviewed and are negative.          Objective:      Vitals:    09/18/23 1023   BP: 130/60   Pulse: 68   Resp: 19   Temp: 97.1 °F (36.2 °C)      Body mass index is 26.24 kg/m².     Physical Exam  Vitals reviewed.    Constitutional:       Appearance: Normal appearance.   HENT:      Head: Normocephalic.      Mouth/Throat:      Mouth: Mucous membranes are moist.   Eyes:      Conjunctiva/sclera: Conjunctivae normal.      Pupils: Pupils are equal, round, and reactive to light.   Cardiovascular:      Rate and Rhythm: Normal rate and regular rhythm.   Pulmonary:      Effort: Pulmonary effort is normal. No respiratory distress.      Breath sounds: Normal breath sounds.   Musculoskeletal:         General: Normal range of motion.      Cervical back: Normal range of motion and neck supple.   Skin:     General: Skin is warm and dry.   Neurological:      Mental Status: She is alert and oriented to person, place, and time.   Psychiatric:         Mood and Affect: Mood normal.            Current Outpatient Medications:     amLODIPine (NORVASC) 10 MG tablet, Take 1 tablet (10 mg total) by mouth once daily., Disp: 90 tablet, Rfl: 3    aspirin (ECOTRIN) 81 MG EC tablet, TAKE 1 TABLET EVERY DAY, Disp: 90 tablet, Rfl: 3    DULoxetine (CYMBALTA) 30 MG capsule, Take 30 mg by mouth once daily., Disp: , Rfl:     DULoxetine (CYMBALTA) 60 MG capsule, Take 1 capsule (60 mg total) by mouth every morning., Disp: 30 capsule, Rfl: 11    levothyroxine (SYNTHROID) 75 MCG tablet, Take 1 tablet (75 mcg total) by mouth once daily., Disp: 90 tablet, Rfl: 3    losartan (COZAAR) 25 MG tablet, Take 0.5 tablets (12.5 mg total) by mouth once daily., Disp: 45 tablet, Rfl: 3    metoprolol succinate (TOPROL-XL) 25 MG 24 hr tablet, Take 1 tablet (25 mg total) by mouth 2 (two) times daily., Disp: 30 tablet, Rfl: 0    oxybutynin (DITROPAN-XL) 10 MG 24 hr tablet, Take 10 mg by mouth once daily., Disp: , Rfl:     pravastatin (PRAVACHOL) 20 MG tablet, Take 20 mg by mouth once daily., Disp: , Rfl:     traZODone (DESYREL) 100 MG tablet, Take 1 tablet (100 mg total) by mouth every evening., Disp: 30 tablet, Rfl: 11    celecoxib (CELEBREX) 200 MG capsule, TAKE 1 CAPSULE EVERY DAY  (Patient not taking: Reported on 9/18/2023), Disp: 90 capsule, Rfl: 0    cinacalcet (SENSIPAR) 30 MG Tab, Take 1 tablet (30 mg total) by mouth once daily., Disp: 30 tablet, Rfl: 3    fluticasone propionate (FLONASE) 50 mcg/actuation nasal spray, 2 sprays by Each Nostril route., Disp: , Rfl:     gabapentin (NEURONTIN) 100 MG capsule, Take 1 capsule (100 mg total) by mouth 2 (two) times daily., Disp: 60 capsule, Rfl: 3    triamcinolone acetonide 0.1% (KENALOG) 0.1 % ointment, Apply topically 2 (two) times daily. (Patient not taking: Reported on 9/18/2023), Disp: 453.6 g, Rfl: 1    Assessment & Plan:     Problem List Items Addressed This Visit          Neuro    Peripheral polyneuropathy     Hx Carpal Tunnel and Neck pain  Declines EMG at present  Resume Gabapentin; Rx sent  F/U if no improvement  Verbalizes understanding         Relevant Medications    gabapentin (NEURONTIN) 100 MG capsule       ID    Need for influenza vaccination - Primary    Relevant Orders    Influenza - Quadrivalent - High Dose (65+) (PF) (IM) (Completed)

## 2023-09-18 NOTE — ASSESSMENT & PLAN NOTE
Hx Carpal Tunnel and Neck pain  Declines EMG at present  Resume Gabapentin; Rx sent  F/U if no improvement  Verbalizes understanding

## 2023-09-18 NOTE — TELEPHONE ENCOUNTER
Urine sample reviewed from [facility] no overt signs of UTI- they did send off for culture so they should find out within 1-2 days

## 2023-09-18 NOTE — TELEPHONE ENCOUNTER
----- Message from Oliverio Marrero sent at 9/15/2023 11:30 AM CDT -----  .Type:  Needs Medical Advice    Who Called: Eliza garay's son  Symptoms (please be specific):    How long has patient had these symptoms:    Pharmacy name and phone #:    Would the patient rather a call back or a response via [facility]?   Best Call Back Number: [phone]  Additional Information: Requested call back from the nurse re: medication question for UTI last night she had night sweats.

## 2023-09-28 ENCOUNTER — TELEPHONE (OUTPATIENT)
Dept: FAMILY MEDICINE | Facility: CLINIC | Age: 88
End: 2023-09-28
Payer: MEDICARE

## 2023-09-28 DIAGNOSIS — G56.01 CARPAL TUNNEL SYNDROME OF RIGHT WRIST: Primary | ICD-10-CM

## 2023-09-28 RX ORDER — PREDNISONE 10 MG/1
TABLET ORAL
Qty: 7 TABLET | Refills: 0 | Status: SHIPPED | OUTPATIENT
Start: 2023-09-28 | End: 2023-10-19

## 2023-09-28 NOTE — TELEPHONE ENCOUNTER
----- Message from Yudith Sainz sent at 9/28/2023 11:11 AM CDT -----  Regarding: referral  .Type:  Needs Medical Advice    Who Called: Pt's son Eliza  Symptoms (please be specific):    How long has patient had these symptoms:    Pharmacy name and phone #:    Would the patient rather a call back or a response via [facility]? Call back  Best Call Back Number: [phone]  Additional Information: Requesting physical therapy for pt's hand.

## 2023-09-28 NOTE — TELEPHONE ENCOUNTER
I have signed for the following orders AND/OR meds. Please notify the patient and ask the patient to schedule the testing and/or information about any medications that were sent.      I spoke to patient over phone  Sx are suggestive of carpal tunnel syndrome to R hand  Please work on DME for R wrist splint- order placed  Patient to start low dose steroid for about 1 week now  Continue gabapentin as she is already doing  PT referral placed  Patient expressed understanding to care plan  Please work on DME order  thanks    Medications Ordered This Encounter   Medications    predniSONE (DELTASONE) 10 MG tablet     Sig: Take two tablets (20mg) by mouth now. Starting tomorrow take one tablet by mouth once a day for 5 more days.     Dispense:  7 tablet     Refill:  0     Orders Placed This Encounter   Procedures    HME - OTHER     Order Specific Question:   Type of Equipment:     Answer:   R wrist splint     Order Specific Question:   Height:     Answer:   1.54m     Order Specific Question:   Weight:     Answer:   138lbs    Ambulatory referral/consult to Physical/Occupational Therapy     Standing Status:   Future     Standing Expiration Date:   10/28/2024     Referral Priority:   Routine     Referral Type:   Physical Medicine     Referral Reason:   Specialty Services Required     Requested Specialty:   Physical Therapy     Number of Visits Requested:   1

## 2023-09-28 NOTE — TELEPHONE ENCOUNTER
----- Message from April Stewart sent at 9/28/2023 10:05 AM CDT -----  Regarding: referral request  .Type:  Patient Requesting Referral    Who Called: patient's son  Does the patient already have the specialty appointment scheduled?: no  If yes, what is the date of that appointment?:  Referral to What Specialty: physical therapy  Reason for Referral: right hand pain and numbness  Does the patient want the referral with a specific physician?:  Is the specialist an [facility]?:  Patient Requesting a Response?: yes  Would the patient rather a call back or a response via [facility]?  Call back  Best Call Back Number: [phone]  Additional Information: patient's son is requesting a referral to PT for his mother due to above issue. She does not want surgery at all due to age. Please advise.

## 2023-10-07 ENCOUNTER — HOSPITAL ENCOUNTER (EMERGENCY)
Facility: HOSPITAL | Age: 88
Discharge: HOME | End: 2023-10-07
Attending: EMERGENCY MEDICINE
Payer: MEDICARE

## 2023-10-07 VITALS
HEART RATE: 82 BPM | DIASTOLIC BLOOD PRESSURE: 78 MMHG | SYSTOLIC BLOOD PRESSURE: 135 MMHG | RESPIRATION RATE: 20 BRPM | WEIGHT: 138 LBS | BODY MASS INDEX: 26.07 KG/M2 | OXYGEN SATURATION: 96 % | TEMPERATURE: 98 F

## 2023-10-07 DIAGNOSIS — K52.9 COLITIS: Primary | ICD-10-CM

## 2023-10-07 LAB
ALBUMIN SERPL-MCNC: 3.6 G/DL (ref 3.4–4.8)
ALBUMIN/GLOB SERPL: 1.4 RATIO (ref 1.1–2)
ALP SERPL-CCNC: 60 UNIT/L (ref 40–150)
ALT SERPL-CCNC: 8 UNIT/L (ref 0–55)
APPEARANCE UR: CLEAR
AST SERPL-CCNC: 16 UNIT/L (ref 5–34)
BACTERIA #/AREA URNS AUTO: NORMAL /HPF
BASOPHILS # BLD AUTO: 0.03 X10(3)/MCL
BASOPHILS NFR BLD AUTO: 0.2 %
BILIRUB SERPL-MCNC: 0.7 MG/DL
BILIRUB UR QL STRIP.AUTO: NEGATIVE
BUN SERPL-MCNC: 20.1 MG/DL (ref 9.8–20.1)
CALCIUM SERPL-MCNC: 9.5 MG/DL (ref 8.4–10.2)
CHLORIDE SERPL-SCNC: 100 MMOL/L (ref 98–107)
CO2 SERPL-SCNC: 29 MMOL/L (ref 23–31)
COLOR UR AUTO: YELLOW
CREAT SERPL-MCNC: 0.93 MG/DL (ref 0.55–1.02)
EOSINOPHIL # BLD AUTO: 0.08 X10(3)/MCL (ref 0–0.9)
EOSINOPHIL NFR BLD AUTO: 0.5 %
ERYTHROCYTE [DISTWIDTH] IN BLOOD BY AUTOMATED COUNT: 12.4 % (ref 11.5–17)
GFR SERPLBLD CREATININE-BSD FMLA CKD-EPI: 59 MLS/MIN/1.73/M2
GLOBULIN SER-MCNC: 2.6 GM/DL (ref 2.4–3.5)
GLUCOSE SERPL-MCNC: 96 MG/DL (ref 82–115)
GLUCOSE UR QL STRIP.AUTO: NEGATIVE
HCT VFR BLD AUTO: 44.3 % (ref 37–47)
HGB BLD-MCNC: 14.8 G/DL (ref 12–16)
IMM GRANULOCYTES # BLD AUTO: 0.08 X10(3)/MCL (ref 0–0.04)
IMM GRANULOCYTES NFR BLD AUTO: 0.5 %
KETONES UR QL STRIP.AUTO: NEGATIVE
LEUKOCYTE ESTERASE UR QL STRIP.AUTO: NEGATIVE
LYMPHOCYTES # BLD AUTO: 1.9 X10(3)/MCL (ref 0.6–4.6)
LYMPHOCYTES NFR BLD AUTO: 11.8 %
MCH RBC QN AUTO: 32.7 PG (ref 27–31)
MCHC RBC AUTO-ENTMCNC: 33.4 G/DL (ref 33–36)
MCV RBC AUTO: 98 FL (ref 80–94)
MONOCYTES # BLD AUTO: 1.91 X10(3)/MCL (ref 0.1–1.3)
MONOCYTES NFR BLD AUTO: 11.8 %
NEUTROPHILS # BLD AUTO: 12.14 X10(3)/MCL (ref 2.1–9.2)
NEUTROPHILS NFR BLD AUTO: 75.2 %
NITRITE UR QL STRIP.AUTO: NEGATIVE
NRBC BLD AUTO-RTO: 0 %
PH UR STRIP.AUTO: 7 [PH]
PLATELET # BLD AUTO: 224 X10(3)/MCL (ref 130–400)
PMV BLD AUTO: 9.1 FL (ref 7.4–10.4)
POTASSIUM SERPL-SCNC: 4.4 MMOL/L (ref 3.5–5.1)
PROT SERPL-MCNC: 6.2 GM/DL (ref 5.8–7.6)
PROT UR QL STRIP.AUTO: NEGATIVE
RBC # BLD AUTO: 4.52 X10(6)/MCL (ref 4.2–5.4)
RBC #/AREA URNS AUTO: <5 /HPF
RBC UR QL AUTO: ABNORMAL
SODIUM SERPL-SCNC: 135 MMOL/L (ref 136–145)
SP GR UR STRIP.AUTO: 1.01 (ref 1–1.03)
SQUAMOUS #/AREA URNS AUTO: <5 /HPF
UROBILINOGEN UR STRIP-ACNC: 0.2
WBC # SPEC AUTO: 16.14 X10(3)/MCL (ref 4.5–11.5)
WBC #/AREA URNS AUTO: <5 /HPF

## 2023-10-07 PROCEDURE — 96360 HYDRATION IV INFUSION INIT: CPT | Mod: 59

## 2023-10-07 PROCEDURE — 81001 URINALYSIS AUTO W/SCOPE: CPT | Performed by: NURSE PRACTITIONER

## 2023-10-07 PROCEDURE — 99285 EMERGENCY DEPT VISIT HI MDM: CPT | Mod: 25

## 2023-10-07 PROCEDURE — 25000003 PHARM REV CODE 250: Performed by: NURSE PRACTITIONER

## 2023-10-07 PROCEDURE — 80053 COMPREHEN METABOLIC PANEL: CPT | Performed by: NURSE PRACTITIONER

## 2023-10-07 PROCEDURE — 25500020 PHARM REV CODE 255: Performed by: EMERGENCY MEDICINE

## 2023-10-07 PROCEDURE — 85025 COMPLETE CBC W/AUTO DIFF WBC: CPT | Performed by: NURSE PRACTITIONER

## 2023-10-07 RX ORDER — CIPROFLOXACIN 500 MG/1
500 TABLET ORAL 2 TIMES DAILY
Qty: 14 TABLET | Refills: 0 | Status: SHIPPED | OUTPATIENT
Start: 2023-10-07 | End: 2023-10-14

## 2023-10-07 RX ORDER — DOCUSATE SODIUM 100 MG/1
100 CAPSULE, LIQUID FILLED ORAL 2 TIMES DAILY
Qty: 14 CAPSULE | Refills: 0 | Status: SHIPPED | OUTPATIENT
Start: 2023-10-07 | End: 2023-10-14

## 2023-10-07 RX ORDER — METRONIDAZOLE 500 MG/1
500 TABLET ORAL 3 TIMES DAILY
Qty: 21 TABLET | Refills: 0 | Status: SHIPPED | OUTPATIENT
Start: 2023-10-07 | End: 2023-10-14

## 2023-10-07 RX ORDER — SODIUM CHLORIDE 9 MG/ML
500 INJECTION, SOLUTION INTRAVENOUS
Status: COMPLETED | OUTPATIENT
Start: 2023-10-07 | End: 2023-10-07

## 2023-10-07 RX ADMIN — SODIUM CHLORIDE 500 ML: 9 INJECTION, SOLUTION INTRAVENOUS at 11:10

## 2023-10-07 RX ADMIN — IOHEXOL 100 ML: 350 INJECTION, SOLUTION INTRAVENOUS at 11:10

## 2023-10-07 NOTE — ED PROVIDER NOTES
Encounter Date: 10/7/2023    SCRIBE #1 NOTE: I, Wally Shankar, am scribing for, and in the presence of,  Dr. Banegas. I have scribed the following portions of the note - Other sections scribed: HPI, ROS, Physical Exam, MDM, Attending.       History     Chief Complaint   Patient presents with    Abdominal Pain     Lower abd pain, since Thursday. Reports dark stool with blood after being constipated  for 5 days. Denies n/v., cp, sob, weakness. +fatigue      87 y/o female with history of HTN, HLD, GERD, CKD and fibromyalgia presents to ED for blood in stool onset this morning.  Pt has had lower abdominal pain and constipation for the past 5 days or so and has been taking kaopectate.  She tried to have BM last night 5x and could not.  She finally had BM this morning with red blood in stool and then had 2x more later on with black stool.  Her abdominal pain was resolved with BM.  She was put on prednisone earlier this week by Dr. Soto, her PCP, for arthritis.      The history is provided by the patient and a relative.     Review of patient's allergies indicates:   Allergen Reactions    Meperidine      Other reaction(s): EXCITATIVE TYPE PSYCHOSIS    Sulfamethoxazole      Past Medical History:   Diagnosis Date    Anxiety 2022    Carpal tunnel syndrome, bilateral     Chronic back pain     CKD (chronic kidney disease) stage 3, GFR 30-59 ml/min 2022    Fibromyalgia     GERD with esophagitis 2022    HTN (hypertension), benign 2022    Hyperlipidemia 2022    Hypothyroidism 2022    Insomnia 2022    OA (osteoarthritis) 2022    Osteoporosis     Primary hyperparathyroidism 2023    Urinary incontinence 2022     Past Surgical History:   Procedure Laterality Date    APPENDECTOMY       SECTION      COLONOSCOPY      HYSTERECTOMY      TOTAL KNEE ARTHROPLASTY       Family History   Family history unknown: Yes     Social History     Tobacco Use    Smoking status: Never    Smokeless tobacco:  Never   Substance Use Topics    Alcohol use: Never    Drug use: Never     Review of Systems   Constitutional:  Negative for fever.   HENT:  Negative for sore throat.    Eyes:  Negative for visual disturbance.   Respiratory:  Negative for cough and shortness of breath.    Cardiovascular:  Negative for chest pain.   Gastrointestinal:  Positive for abdominal pain, blood in stool and constipation. Negative for diarrhea, nausea and vomiting.   Genitourinary:  Negative for dysuria and hematuria.   Skin:  Negative for rash.   Neurological:  Negative for syncope and headaches.   All other systems reviewed and are negative.      Physical Exam     Initial Vitals [10/07/23 1058]   BP Pulse Resp Temp SpO2   134/89 79 19 98.3 °F (36.8 °C) 100 %      MAP       --         Physical Exam    Nursing note and vitals reviewed.  Constitutional: No distress.   HENT:   Head: Normocephalic and atraumatic.   Right Ear: Tympanic membrane normal.   Left Ear: Tympanic membrane normal.   Mouth/Throat: Oropharynx is clear and moist. No oropharyngeal exudate or posterior oropharyngeal erythema.   Rash along L jaw line consistent with shingles   Eyes: EOM are normal.   Neck: Trachea normal. Neck supple. Carotid bruit is not present. No JVD present.   Normal range of motion.  Cardiovascular:  Normal rate and regular rhythm.           No murmur heard.  Pulmonary/Chest: Breath sounds normal. No respiratory distress. She exhibits no tenderness.   Abdominal: Abdomen is soft. Bowel sounds are normal. She exhibits no distension. There is no abdominal tenderness.   Musculoskeletal:         General: Normal range of motion.      Cervical back: Normal range of motion and neck supple.      Lumbar back: Normal. Normal range of motion.     Neurological: She is alert and oriented to person, place, and time. She has normal strength. No cranial nerve deficit or sensory deficit.   Psychiatric: She has a normal mood and affect. Judgment normal.         ED Course    Procedures  Labs Reviewed   COMPREHENSIVE METABOLIC PANEL - Abnormal; Notable for the following components:       Result Value    Sodium Level 135 (*)     All other components within normal limits   URINALYSIS, REFLEX TO URINE CULTURE - Abnormal; Notable for the following components:    Blood, UA 1+ (*)     All other components within normal limits   CBC WITH DIFFERENTIAL - Abnormal; Notable for the following components:    WBC 16.14 (*)     MCV 98.0 (*)     MCH 32.7 (*)     Neut # 12.14 (*)     Mono # 1.91 (*)     IG# 0.08 (*)     All other components within normal limits   URINALYSIS, MICROSCOPIC - Normal   CBC W/ AUTO DIFFERENTIAL    Narrative:     The following orders were created for panel order CBC Auto Differential.  Procedure                               Abnormality         Status                     ---------                               -----------         ------                     CBC with Differential[[id number]]       Abnormal            Final result                 Please view results for these tests on the individual orders.          Imaging Results              CT Abdomen Pelvis With Contrast (Final result)  Result time 10/07/23 11:59:30      Final result by Milton Echevarria MD (10/07/23 11:59:30)                   Impression:      1. Suspect a nonspecific colitis primarily involving the descending colon.  2. Question gastritis as well.  3. Bladder wall thickening which could be seen with cystitis.  4. Other chronic findings above.      Electronically signed by: Milton Echevarria  Date:    10/07/2023  Time:    11:59               Narrative:    EXAMINATION:  CT ABDOMEN PELVIS WITH CONTRAST    CLINICAL HISTORY:  Abdominal pain, acute, nonlocalized;    TECHNIQUE:  Helical acquisition through the abdomen and pelvis with IV contrast.  Three plane reconstructions were provided for review.  mGycm. Automatic exposure control, adjustment of mA/kV or iterative reconstruction technique was used to reduce  radiation.    COMPARISON:  No prior CT    FINDINGS:  Mild chronic changes at the lung bases.  The heart is mildly enlarged and there are coronary artery calcifications.    There is no significant abnormality of the liver, gallbladder or spleen.  There is pancreatic atrophy.  Mild left adrenal thickening.  No hydronephrosis or suspicious renal lesion.,    No bowel obstruction.  Small hiatal hernia.  There is some wall thickening and mucosal enhancement of the stomach.  There is also some wall thickening of the colon primarily the splenic flexure and descending colon.  There is colonic diverticulosis.  No free air.    There is a low abdominal hernia which contains the dome of the bladder.  There is bladder wall thickening.  No pelvic free fluid.  Abdominal aorta normal in caliber.  Moderate atherosclerotic disease.  Widely patent mesenteric arteries.    There are advanced degenerative changes of the spine.                                       Medications   0.9%  NaCl infusion (500 mLs Intravenous New Bag 10/7/23 1123)   iohexoL (OMNIPAQUE 350) injection 100 mL (100 mLs Intravenous Given 10/7/23 1145)     Medical Decision Making  Differential diagnoses include, but are not limited to: peptic ulcer disease, gastritis, medication side effect, ischemic colitis, constipation     Amount and/or Complexity of Data Reviewed  Labs: ordered.  Radiology: ordered.    Risk  Prescription drug management.            Scribe Attestation:   Scribe #1: I performed the above scribed service and the documentation accurately describes the services I performed. I attest to the accuracy of the note.    Attending Attestation:           Physician Attestation for Scribe:  Physician Attestation Statement for Scribe #1: I, reviewed documentation, as scribed by Wally Shankar in my presence, and it is both accurate and complete.                             Clinical Impression:   Final diagnoses:  [K52.9] Colitis (Primary)        ED Disposition  Condition    Discharge Stable          ED Prescriptions       Medication Sig Dispense Start Date End Date Auth. Provider    ciprofloxacin HCl (CIPRO) 500 MG tablet Take 1 tablet (500 mg total) by mouth 2 (two) times daily. for 7 days 14 tablet 10/7/2023 10/14/2023 Evert Banegas MD    metroNIDAZOLE (FLAGYL) 500 MG tablet Take 1 tablet (500 mg total) by mouth 3 (three) times daily. for 7 days 21 tablet 10/7/2023 10/14/2023 Evert Banegas MD    docusate sodium (COLACE) 100 MG capsule Take 1 capsule (100 mg total) by mouth 2 (two) times daily. for 7 days 14 capsule 10/7/2023 10/14/2023 Evert Banegas MD          Follow-up Information       Follow up With Specialties Details Why Contact Info    Mariely Soto MD Internal Medicine Schedule an appointment as soon as possible for a visit   [address]  [facility] [address]  [phone]               Evert Banegas MD  10/07/23 6431

## 2023-10-07 NOTE — FIRST PROVIDER EVALUATION
Medical screening examination initiated.  I have conducted a focused provider triage encounter, findings are as follows:    Brief history of present illness:  89 y/o  F  presents to the ED with lower abdomina pain onset 4 days. She was treated for constipation with  kaopectate. Since then she reports black tarry stool. Denies any weakness or SOB/chest pain in triage.     Vitals:    10/07/23 1055   Weight: 62.6 kg (138 lb)       Pertinent physical exam:  AAA x 3    Brief workup plan:  Labs/ Meds    Preliminary workup initiated; this workup will be continued and followed by the physician or advanced practice provider that is assigned to the patient when roomed.

## 2023-10-09 ENCOUNTER — TELEPHONE (OUTPATIENT)
Dept: FAMILY MEDICINE | Facility: CLINIC | Age: 88
End: 2023-10-09
Payer: MEDICARE

## 2023-10-09 NOTE — TELEPHONE ENCOUNTER
Pt unable to take the prednisone due to it irritating her stomach  Is there any other medication that pt can take for her hand pain  Pt is taking gabapentin with no relief  Please advise

## 2023-10-09 NOTE — TELEPHONE ENCOUNTER
----- Message from April Stewart sent at 10/9/2023 10:30 AM CDT -----  Regarding: med advice  .Type:  Needs Medical Advice    Who Called:  patient's son  Symptoms (please be specific): right hand pain and inflamed stomach  How long has patient had these symptoms:    Pharmacy name and phone #:    Would the patient rather a call back or a response via [facility]? Call back  Best Call Back Number:  [phone]  Additional Information:  patient's son would like to know if there is anything other than prednisone for the patient's right hand pain. It was inflaming her stomach after an ER visit this weekend. Please advise.

## 2023-10-13 ENCOUNTER — TELEPHONE (OUTPATIENT)
Dept: FAMILY MEDICINE | Facility: CLINIC | Age: 88
End: 2023-10-13
Payer: MEDICARE

## 2023-10-13 DIAGNOSIS — G62.9 PERIPHERAL POLYNEUROPATHY: ICD-10-CM

## 2023-10-13 RX ORDER — GABAPENTIN 300 MG/1
300 CAPSULE ORAL 2 TIMES DAILY
Qty: 60 CAPSULE | Refills: 11 | Status: SHIPPED | OUTPATIENT
Start: 2023-10-13

## 2023-10-19 ENCOUNTER — TELEPHONE (OUTPATIENT)
Dept: FAMILY MEDICINE | Facility: CLINIC | Age: 88
End: 2023-10-19
Payer: MEDICARE

## 2023-10-19 DIAGNOSIS — G56.01 CARPAL TUNNEL SYNDROME OF RIGHT WRIST: Primary | ICD-10-CM

## 2023-10-19 NOTE — TELEPHONE ENCOUNTER
I have signed for the following orders AND/OR meds. Please notify the patient and ask the patient to schedule the testing and/or information about any medications that were sent.         Orders Placed This Encounter   Procedures    Ambulatory referral/consult to Orthopedics     Standing Status:   Future     Standing Expiration Date:   11/19/2024     Referral Priority:   Urgent     Referral Type:   Consultation     Referred to Provider:   Alphonso Salomon MD     Requested Specialty:   Orthopedic Surgery     Number of Visits Requested:   1    Ambulatory referral/consult to Home Health     Standing Status:   Future     Standing Expiration Date:   11/19/2024     Referral Priority:   Routine     Referral Type:   Home Health     Referral Reason:   Specialty Services Required     Requested Specialty:   Home Health Services     Number of Visits Requested:   1

## 2023-10-19 NOTE — TELEPHONE ENCOUNTER
----- Message from Yudith Sainz sent at 10/19/2023  1:58 PM CDT -----  Regarding: med advice  .Type:  Needs Medical Advice    Who Called: Pt's son Eliza  Symptoms (please be specific): Right hand pain   How long has patient had these symptoms:    Pharmacy name and phone #:    Would the patient rather a call back or a response via [facility]? Call back  Best Call Back Number: [phone]  Additional Information: Requesting in home physical therapy..

## 2023-10-20 ENCOUNTER — TELEPHONE (OUTPATIENT)
Dept: FAMILY MEDICINE | Facility: CLINIC | Age: 88
End: 2023-10-20
Payer: MEDICARE

## 2023-10-20 NOTE — TELEPHONE ENCOUNTER
----- Message from April Stewart sent at 10/20/2023  9:06 AM CDT -----  Regarding: med advice  .Type:  Needs Medical Advice    Who Called:  patient's son  Symptoms (please be specific):    How long has patient had these symptoms:    Pharmacy name and phone #:    Would the patient rather a call back or a response via [facility]? Call back  Best Call Back Number:  [phone]  Additional Information:  patient's son was following up about conversation he had with Dr. Soto on yesterday. The patient is not feeling pain in hand but numbness now. Please advise.

## 2023-10-20 NOTE — TELEPHONE ENCOUNTER
Spoke with pts son  Advised that the referral has been sent urgent to Dr Salomon and Kaylee has been in contact with their office to schedule her  Expressed understanding

## 2023-10-21 PROCEDURE — G0180 PR HOME HEALTH MD CERTIFICATION: ICD-10-PCS | Mod: ,,, | Performed by: INTERNAL MEDICINE

## 2023-10-21 PROCEDURE — G0180 MD CERTIFICATION HHA PATIENT: HCPCS | Mod: ,,, | Performed by: INTERNAL MEDICINE

## 2023-10-24 ENCOUNTER — TELEPHONE (OUTPATIENT)
Dept: FAMILY MEDICINE | Facility: CLINIC | Age: 88
End: 2023-10-24
Payer: MEDICARE

## 2023-10-24 NOTE — TELEPHONE ENCOUNTER
----- Message from Yamilex Glover sent at 10/24/2023 11:20 AM CDT -----  Type:  RX Refill Request    Who Called: Edwin  Refill or New Rx:refill  RX Name and Strength:celecoxib (CELEBREX) 200 MG capsule  How is the patient currently taking it? (ex. 1XDay):  Is this a 30 day or 90 day RX:  Preferred Pharmacy with phone number:[facility] [place]  Local or Mail Order:  Ordering Provider:  Would the patient rather a call back or a response via [facility]?   Best Call Back Number:  Additional Information:

## 2023-10-25 RX ORDER — CELECOXIB 200 MG/1
200 CAPSULE ORAL DAILY
Qty: 30 CAPSULE | Refills: 0 | Status: SHIPPED | OUTPATIENT
Start: 2023-10-25 | End: 2023-11-24

## 2023-10-25 NOTE — TELEPHONE ENCOUNTER
Covering for dr. Soto while out.     Celebrex is not listed on his med list but is listed on lov 9/18/23. Renal fx 10/2023 wnl. Refill approved. Keep scheduled appts with dr. Soto      I have signed for the following orders AND/OR meds.  Please call the patient and ask the patient to schedule the testing AND/OR inform about any medications that were sent.        Medications Ordered This Encounter   Medications    celecoxib (CELEBREX) 200 MG capsule     Sig: Take 1 capsule (200 mg total) by mouth once daily. As needed for pain. Take with food     Dispense:  30 capsule     Refill:  0

## 2023-10-25 NOTE — TELEPHONE ENCOUNTER
----- Message from Yamilex Glover sent at 10/24/2023 11:20 AM CDT -----  Type:  RX Refill Request    Who Called: Edwin  Refill or New Rx:refill  RX Name and Strength:celecoxib (CELEBREX) 200 MG capsule  How is the patient currently taking it? (ex. 1XDay):  Is this a 30 day or 90 day RX:  Preferred Pharmacy with phone number:[facility] [place]  Local or Mail Order:  Ordering Provider:  Would the patient rather a call back or a response via [facility]?   Best Call Back Number:  Additional Information:

## 2023-10-27 NOTE — TELEPHONE ENCOUNTER
Patient notified that rx sent to pharmacy and to keep appointment with Dr. Soto. Patient voiced understanding. Katina

## 2023-11-13 ENCOUNTER — HOSPITAL ENCOUNTER (OUTPATIENT)
Dept: RADIOLOGY | Facility: CLINIC | Age: 88
Discharge: HOME | End: 2023-11-13
Attending: STUDENT IN AN ORGANIZED HEALTH CARE EDUCATION/TRAINING PROGRAM
Payer: MEDICARE

## 2023-11-13 ENCOUNTER — OFFICE VISIT (OUTPATIENT)
Dept: ORTHOPEDICS | Facility: CLINIC | Age: 88
End: 2023-11-13
Payer: MEDICARE

## 2023-11-13 VITALS
DIASTOLIC BLOOD PRESSURE: 74 MMHG | HEART RATE: 67 BPM | SYSTOLIC BLOOD PRESSURE: 146 MMHG | WEIGHT: 138 LBS | BODY MASS INDEX: 26.06 KG/M2 | HEIGHT: 61 IN

## 2023-11-13 DIAGNOSIS — M79.641 RIGHT HAND PAIN: Primary | ICD-10-CM

## 2023-11-13 DIAGNOSIS — M18.11 PRIMARY OSTEOARTHRITIS OF FIRST CARPOMETACARPAL JOINT OF RIGHT HAND: Primary | ICD-10-CM

## 2023-11-13 DIAGNOSIS — G56.01 CARPAL TUNNEL SYNDROME OF RIGHT WRIST: ICD-10-CM

## 2023-11-13 PROCEDURE — 1101F PT FALLS ASSESS-DOCD LE1/YR: CPT | Mod: CPTII,,, | Performed by: STUDENT IN AN ORGANIZED HEALTH CARE EDUCATION/TRAINING PROGRAM

## 2023-11-13 PROCEDURE — 1159F MED LIST DOCD IN RCRD: CPT | Mod: CPTII,,, | Performed by: STUDENT IN AN ORGANIZED HEALTH CARE EDUCATION/TRAINING PROGRAM

## 2023-11-13 PROCEDURE — 73110 XR WRIST COMPLETE 3 VIEWS RIGHT: ICD-10-PCS | Mod: RT,,, | Performed by: STUDENT IN AN ORGANIZED HEALTH CARE EDUCATION/TRAINING PROGRAM

## 2023-11-13 PROCEDURE — 99203 PR OFFICE/OUTPT VISIT, NEW, LEVL III, 30-44 MIN: ICD-10-PCS | Mod: 25,,, | Performed by: STUDENT IN AN ORGANIZED HEALTH CARE EDUCATION/TRAINING PROGRAM

## 2023-11-13 PROCEDURE — 99203 OFFICE O/P NEW LOW 30 MIN: CPT | Mod: 25,,, | Performed by: STUDENT IN AN ORGANIZED HEALTH CARE EDUCATION/TRAINING PROGRAM

## 2023-11-13 PROCEDURE — 1101F PR PT FALLS ASSESS DOC 0-1 FALLS W/OUT INJ PAST YR: ICD-10-PCS | Mod: CPTII,,, | Performed by: STUDENT IN AN ORGANIZED HEALTH CARE EDUCATION/TRAINING PROGRAM

## 2023-11-13 PROCEDURE — 3288F PR FALLS RISK ASSESSMENT DOCUMENTED: ICD-10-PCS | Mod: CPTII,,, | Performed by: STUDENT IN AN ORGANIZED HEALTH CARE EDUCATION/TRAINING PROGRAM

## 2023-11-13 PROCEDURE — 20600 DRAIN/INJ JOINT/BURSA W/O US: CPT | Mod: RT,,, | Performed by: STUDENT IN AN ORGANIZED HEALTH CARE EDUCATION/TRAINING PROGRAM

## 2023-11-13 PROCEDURE — 3288F FALL RISK ASSESSMENT DOCD: CPT | Mod: CPTII,,, | Performed by: STUDENT IN AN ORGANIZED HEALTH CARE EDUCATION/TRAINING PROGRAM

## 2023-11-13 PROCEDURE — 1159F PR MEDICATION LIST DOCUMENTED IN MEDICAL RECORD: ICD-10-PCS | Mod: CPTII,,, | Performed by: STUDENT IN AN ORGANIZED HEALTH CARE EDUCATION/TRAINING PROGRAM

## 2023-11-13 PROCEDURE — 20600 SMALL JOINT ASPIRATION/INJECTION: R THUMB CMC: ICD-10-PCS | Mod: RT,,, | Performed by: STUDENT IN AN ORGANIZED HEALTH CARE EDUCATION/TRAINING PROGRAM

## 2023-11-13 PROCEDURE — 73110 X-RAY EXAM OF WRIST: CPT | Mod: RT,,, | Performed by: STUDENT IN AN ORGANIZED HEALTH CARE EDUCATION/TRAINING PROGRAM

## 2023-11-13 RX ADMIN — BETAMETHASONE SODIUM PHOSPHATE AND BETAMETHASONE ACETATE 6 MG: 3; 3 INJECTION, SUSPENSION INTRA-ARTICULAR; INTRALESIONAL; INTRAMUSCULAR; SOFT TISSUE at 03:11

## 2023-11-13 RX ADMIN — LIDOCAINE HYDROCHLORIDE 1 ML: 10 INJECTION INFILTRATION; PERINEURAL at 03:11

## 2023-11-21 ENCOUNTER — TELEPHONE (OUTPATIENT)
Dept: ORTHOPEDICS | Facility: CLINIC | Age: 88
End: 2023-11-21
Payer: MEDICARE

## 2023-11-21 RX ORDER — LIDOCAINE HYDROCHLORIDE 10 MG/ML
1 INJECTION INFILTRATION; PERINEURAL
Status: DISCONTINUED | OUTPATIENT
Start: 2023-11-13 | End: 2023-11-21 | Stop reason: HOSPADM

## 2023-11-21 RX ORDER — BETAMETHASONE SODIUM PHOSPHATE AND BETAMETHASONE ACETATE 3; 3 MG/ML; MG/ML
6 INJECTION, SUSPENSION INTRA-ARTICULAR; INTRALESIONAL; INTRAMUSCULAR; SOFT TISSUE
Status: DISCONTINUED | OUTPATIENT
Start: 2023-11-13 | End: 2023-11-21 | Stop reason: HOSPADM

## 2023-11-21 NOTE — TELEPHONE ENCOUNTER
Patient's son called stating that the patient is still having off and on pain. I explained that she should do elevation, ice, OTC medications for a few days to see if it helps. Also, after an injection it sometimes takes several days before start to get relief. I explained that they should do what is stated below and give it a few more days to see if she starts to improve. Patients son voiced a clear understanding.

## 2023-11-21 NOTE — PROGRESS NOTES
Chief Complaint:  Right thumb pain    Consulting Physician: Mariely Soto MD    History of present illness:    Patient is a 88-year-old female who presents for initial evaluation of her right hand pain.  She lives in an assisted living.  She is accompanied here by her son today.  She complains of pain localized to the base of the thumb.  It does not radiate.  She has not tried a brace for this.  She has not had an injection into this.  She tried physical therapy which did not help.  She states that it hurts to turn her wrist.  The pain is worse at night.  She denies any numbness or tingling into the fingertips.  She states that this pain makes her hand feel weak.    Past Medical History:   Diagnosis Date    Anxiety 2022    Carpal tunnel syndrome, bilateral     Chronic back pain     CKD (chronic kidney disease) stage 3, GFR 30-59 ml/min 2022    Fibromyalgia     GERD with esophagitis 2022    HTN (hypertension), benign 2022    Hyperlipidemia 2022    Hypothyroidism 2022    Insomnia 2022    OA (osteoarthritis) 2022    Osteoporosis     Primary hyperparathyroidism 2023    Urinary incontinence 2022       Past Surgical History:   Procedure Laterality Date    APPENDECTOMY       SECTION      COLONOSCOPY      HYSTERECTOMY      TOTAL KNEE ARTHROPLASTY         Current Outpatient Medications   Medication Sig    amLODIPine (NORVASC) 10 MG tablet Take 1 tablet (10 mg total) by mouth once daily.    aspirin (ECOTRIN) 81 MG EC tablet TAKE 1 TABLET EVERY DAY    celecoxib (CELEBREX) 200 MG capsule Take 1 capsule (200 mg total) by mouth once daily. As needed for pain. Take with food    cinacalcet (SENSIPAR) 30 MG Tab Take 1 tablet (30 mg total) by mouth once daily.    DULoxetine (CYMBALTA) 30 MG capsule Take 30 mg by mouth once daily.    DULoxetine (CYMBALTA) 60 MG capsule Take 1 capsule (60 mg total) by mouth every morning.    fluticasone propionate (FLONASE) 50 mcg/actuation nasal spray  "2 sprays by Each Nostril route.    gabapentin (NEURONTIN) 300 MG capsule Take 1 capsule (300 mg total) by mouth 2 (two) times daily.    levothyroxine (SYNTHROID) 75 MCG tablet Take 1 tablet (75 mcg total) by mouth once daily.    losartan (COZAAR) 25 MG tablet Take 0.5 tablets (12.5 mg total) by mouth once daily.    metoprolol succinate (TOPROL-XL) 25 MG 24 hr tablet Take 1 tablet (25 mg total) by mouth 2 (two) times daily.    oxybutynin (DITROPAN-XL) 10 MG 24 hr tablet Take 10 mg by mouth once daily.    pravastatin (PRAVACHOL) 20 MG tablet Take 20 mg by mouth once daily.    traZODone (DESYREL) 100 MG tablet Take 1 tablet (100 mg total) by mouth every evening.     No current facility-administered medications for this visit.       Review of patient's allergies indicates:   Allergen Reactions    Meperidine      Other reaction(s): EXCITATIVE TYPE PSYCHOSIS    Sulfamethoxazole        Family History   Problem Relation Age of Onset    No Known Problems Mother     No Known Problems Father        Social History     Socioeconomic History    Marital status:    Tobacco Use    Smoking status: Never    Smokeless tobacco: Never   Substance and Sexual Activity    Alcohol use: Never    Drug use: Never       Review of Systems:    Constitution:   Denies chills, fever, and sweats.  HENT:   Denies headaches or blurry vision.  Cardiovascular:  Denies chest pain or irregular heart beat.  Respiratory:   Denies cough or shortness of breath.  Gastrointestinal:  Denies abdominal pain, nausea, or vomiting.  Musculoskeletal:   Denies muscle cramps.  Neurological:   Denies dizziness or focal weakness.  Psychiatric/Behavior: Normal mental status.  Hematology/Lymph:  Denies bleeding problem or easy bruising/bleeding.  Skin:    Denies rash or suspicious lesions.    Examination:    Vital Signs:    Vitals:    11/13/23 1456   BP: (!) 146/74   Pulse: 67   Weight: 62.6 kg (138 lb)   Height: 5' 1" (1.549 m)       Body mass index is 26.07 " kg/m².    Constitution:   Well-developed, well nourished patient in no acute distress.  Neurological:   Alert and oriented x 3 and cooperative to examination.     Psychiatric/Behavior: Normal mental status.  Respiratory:   No shortness of breath.  Eyes:    Extraoccular muscles intact  Skin:    No scars, rash or suspicious lesions.    MSK:   Right hand:  No open wounds or rashes.  Tenderness to palpation over the thumb CMC joint.  Adduction deformity of the thumb metacarpal with hyperextension at the metacarpophalangeal joint of the thumb.  Grind test is positive.  She is able to make a fist and extend her digits.  Sensation light touch intact in median ulnar radial distribution.  Radial pulse 2 +hand is warm well perfused    Imaging:   X-ray of the right wrist complete shows significant arthritic changes most notably at the thumb CMC joint     Assessment:  Right thumb CMC osteoarthritis    Plan:  We can treat this conservatively.  I will give her a injection to the right thumb CMC joint today.  Can see her back as needed if her symptoms persist    Follow Up:  As needed  Xray at next visit:  None

## 2023-11-21 NOTE — PROCEDURES
Small Joint Aspiration/Injection: R thumb CMC    Date/Time: 11/13/2023 3:00 PM    Performed by: Alphonso Salomon MD  Authorized by: Alphonso Salomon MD    Consent Done?:  Yes (Verbal)  Indications:  Pain  Timeout: prior to procedure the correct patient, procedure, and site was verified    Location:  Thumb  Site:  R thumb CMC  Needle size:  25 G  Approach:  Dorsal  Medications:  1 mL LIDOcaine HCL 10 mg/ml (1%) 10 mg/mL (1 %); 6 mg betamethasone acetate-betamethasone sodium phosphate 6 mg/mL  Patient tolerance:  Patient tolerated the procedure well with no immediate complications

## 2023-11-24 ENCOUNTER — EXTERNAL HOME HEALTH (OUTPATIENT)
Dept: HOME HEALTH SERVICES | Facility: HOSPITAL | Age: 88
End: 2023-11-24
Payer: MEDICARE

## 2023-12-04 ENCOUNTER — TELEPHONE (OUTPATIENT)
Dept: FAMILY MEDICINE | Facility: CLINIC | Age: 88
End: 2023-12-04
Payer: MEDICARE

## 2023-12-04 NOTE — TELEPHONE ENCOUNTER
----- Message from Yudith Sainz sent at 12/4/2023  8:36 AM CST -----  Regarding: Pharmacy  .Type:  Needs Medical Advice    Who Called: Pt's son Eliza  Symptoms (please be specific):    How long has patient had these symptoms:    Pharmacy name and phone #:[id number] - [place] AT - [address] [facility] [address]     Would the patient rather a call back or a response via [facility]? Call back  Best Call Back Number: [phone]  Additional Information: Pt wants meds called in locally for now on rather than by mail with [employer].

## 2023-12-08 ENCOUNTER — TELEPHONE (OUTPATIENT)
Dept: FAMILY MEDICINE | Facility: CLINIC | Age: 88
End: 2023-12-08

## 2023-12-08 DIAGNOSIS — I10 HTN (HYPERTENSION), BENIGN: ICD-10-CM

## 2023-12-08 RX ORDER — METOPROLOL SUCCINATE 25 MG/1
25 TABLET, EXTENDED RELEASE ORAL 2 TIMES DAILY
Qty: 180 TABLET | Refills: 1 | Status: ON HOLD | OUTPATIENT
Start: 2023-12-08 | End: 2024-01-22 | Stop reason: HOSPADM

## 2023-12-08 RX ORDER — AMLODIPINE BESYLATE 10 MG/1
10 TABLET ORAL DAILY
Qty: 90 TABLET | Refills: 3 | Status: SHIPPED | OUTPATIENT
Start: 2023-12-08

## 2023-12-08 NOTE — TELEPHONE ENCOUNTER
I called patient to verify rx's. Left message on personalized voicemail to return call. I spoke with patients son Eliza and those are the rx's, I verified with him. Rx's sent to pharmacy. Patients son Aware.  Katina

## 2023-12-12 ENCOUNTER — DOCUMENT SCAN (OUTPATIENT)
Dept: HOME HEALTH SERVICES | Facility: HOSPITAL | Age: 88
End: 2023-12-12
Payer: MEDICARE

## 2023-12-19 ENCOUNTER — DOCUMENT SCAN (OUTPATIENT)
Dept: HOME HEALTH SERVICES | Facility: HOSPITAL | Age: 88
End: 2023-12-19
Payer: MEDICARE

## 2023-12-19 ENCOUNTER — OFFICE VISIT (OUTPATIENT)
Dept: FAMILY MEDICINE | Facility: CLINIC | Age: 88
End: 2023-12-19
Payer: MEDICARE

## 2023-12-19 VITALS
HEART RATE: 92 BPM | SYSTOLIC BLOOD PRESSURE: 106 MMHG | WEIGHT: 144.13 LBS | HEIGHT: 61 IN | BODY MASS INDEX: 27.21 KG/M2 | TEMPERATURE: 98 F | DIASTOLIC BLOOD PRESSURE: 74 MMHG | RESPIRATION RATE: 14 BRPM

## 2023-12-19 DIAGNOSIS — N18.30 STAGE 3 CHRONIC KIDNEY DISEASE, UNSPECIFIED WHETHER STAGE 3A OR 3B CKD: ICD-10-CM

## 2023-12-19 DIAGNOSIS — E78.5 HYPERLIPIDEMIA, UNSPECIFIED HYPERLIPIDEMIA TYPE: ICD-10-CM

## 2023-12-19 DIAGNOSIS — I10 HTN (HYPERTENSION), BENIGN: ICD-10-CM

## 2023-12-19 DIAGNOSIS — E03.9 HYPOTHYROIDISM, UNSPECIFIED TYPE: Primary | ICD-10-CM

## 2023-12-19 PROCEDURE — 1160F RVW MEDS BY RX/DR IN RCRD: CPT | Mod: CPTII,,, | Performed by: INTERNAL MEDICINE

## 2023-12-19 PROCEDURE — 99214 PR OFFICE/OUTPT VISIT, EST, LEVL IV, 30-39 MIN: ICD-10-PCS | Mod: ,,, | Performed by: INTERNAL MEDICINE

## 2023-12-19 PROCEDURE — 1159F PR MEDICATION LIST DOCUMENTED IN MEDICAL RECORD: ICD-10-PCS | Mod: CPTII,,, | Performed by: INTERNAL MEDICINE

## 2023-12-19 PROCEDURE — 3288F PR FALLS RISK ASSESSMENT DOCUMENTED: ICD-10-PCS | Mod: CPTII,,, | Performed by: INTERNAL MEDICINE

## 2023-12-19 PROCEDURE — 99214 OFFICE O/P EST MOD 30 MIN: CPT | Mod: ,,, | Performed by: INTERNAL MEDICINE

## 2023-12-19 PROCEDURE — 3288F FALL RISK ASSESSMENT DOCD: CPT | Mod: CPTII,,, | Performed by: INTERNAL MEDICINE

## 2023-12-19 PROCEDURE — 1101F PT FALLS ASSESS-DOCD LE1/YR: CPT | Mod: CPTII,,, | Performed by: INTERNAL MEDICINE

## 2023-12-19 PROCEDURE — 1159F MED LIST DOCD IN RCRD: CPT | Mod: CPTII,,, | Performed by: INTERNAL MEDICINE

## 2023-12-19 PROCEDURE — 1160F PR REVIEW ALL MEDS BY PRESCRIBER/CLIN PHARMACIST DOCUMENTED: ICD-10-PCS | Mod: CPTII,,, | Performed by: INTERNAL MEDICINE

## 2023-12-19 PROCEDURE — 1101F PR PT FALLS ASSESS DOC 0-1 FALLS W/OUT INJ PAST YR: ICD-10-PCS | Mod: CPTII,,, | Performed by: INTERNAL MEDICINE

## 2023-12-19 NOTE — PROGRESS NOTES
Subjective:      Patient ID: Edwin Boyer is a 88 y.o. female.    Chief Complaint: Hand Pain    HPI  Follow up visit  Last wellness 08/29/2023  She is status post injection for CMC arthritis in the thumb with Dr. Salomon and also a visit for wrist numbness/tingling with our ELIS, refilled gabapentin.  Patient is doing well today, her son Eliza present for today's visit    Hypercalcemia: has seen Dr. Hernandez before, was recommended to start sensipar, patient now taking this  Hypothyroidism: synthroid 75 mcg po by daily (generic only); no hot/cold intolerance  HTN: compliant with medication  HLD: pravastatin 20 mg po daily , no myalgias, aspirin 81 mg po daily  GERD without esophagitis: sx controlled   urinary incontinence: worse; says oxybutynin did not help much, declines urology evaluation  OA: taking tylenol PRN only now  Fibromyalgia: cymbalta 60 +30  mg daily, gabapentin 100 mg (two caps BID)  Anxiety/Depression: Cymbalta is helping at higher dose- she follows Dr. Vásquez with psychology  Insomnia: Trazodone 100 mg po at bedtime  osteoporosis: noted on DEXA 8/2020,  *did not tolerate alendronate, GI issues  *says that she cannot afford Prolia  bilateral carpal tunnel syndrome: waking up at night with bilateral hand numbness  CKD 3: stable, does not drink much water    surgery history:  angiogram  breast cyst removal , L breast  complete hysterectomy, no cancer  bilateral knee replacements  appendectomy  c/s x 3      Rheumatologist: Dr. Shook  Cardiologist: Dr. Guidry  urologist: Dr. 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   HENT:  Negative for congestion, sinus pressure and sore throat.    Respiratory:  Negative for cough and shortness of breath.    Cardiovascular:  Negative for chest pain and palpitations.   Gastrointestinal:  Negative for abdominal pain and nausea.   Skin:  Negative for rash.       Objective:     Vitals:    12/19/23 0857   BP: 106/74   BP Location: Left arm   Patient Position: Sitting   BP Method: Large (Automatic)   Pulse: 92   Resp: 14   Temp: 97.8 °F (36.6 °C)   TempSrc: Temporal   Weight: 65.4 kg (144 lb 1.6 oz)   Height: 5' 1" (1.549 m)     Physical Exam  Vitals and nursing note reviewed.   Constitutional:       General: She is not in acute distress.     Appearance: She is well-developed. She is not diaphoretic.   HENT:      Head: Normocephalic and atraumatic.   Eyes:      General:         Right eye: No discharge.         Left eye: No discharge.      Conjunctiva/sclera: Conjunctivae normal.      Pupils: Pupils are equal, round, and reactive to light.   Neck:      Thyroid: No thyromegaly.   Cardiovascular:      Rate and Rhythm: Normal rate and regular rhythm.      Heart sounds: Normal heart sounds. No murmur heard.  Pulmonary:      Effort: Pulmonary effort is normal. No respiratory distress.      Breath sounds: Normal breath sounds. No wheezing or rales.   Abdominal:      General: There is no distension.      Palpations: Abdomen is soft.      Tenderness: There is no " abdominal tenderness.   Musculoskeletal:      Cervical back: Normal range of motion and neck supple.   Lymphadenopathy:      Cervical: No cervical adenopathy.   Skin:     General: Skin is warm and dry.   Neurological:      Mental Status: She is alert and oriented to person, place, and time.   Psychiatric:         Mood and Affect: Mood normal.         Behavior: Behavior normal.       Assessment/Plan:     1. Hypothyroidism, unspecified type  -     Comprehensive Metabolic Panel; Future; Expected date: 02/19/2024  -     Lipid Panel; Future; Expected date: 02/19/2024  -     CBC Auto Differential; Future; Expected date: 02/19/2024  -     Urinalysis; Future; Expected date: 02/19/2024  -     TSH; Future; Expected date: 02/19/2024  Stable continue levothyroxine  2. Hyperlipidemia, unspecified hyperlipidemia type  -     Comprehensive Metabolic Panel; Future; Expected date: 02/19/2024  -     Lipid Panel; Future; Expected date: 02/19/2024  -     CBC Auto Differential; Future; Expected date: 02/19/2024  -     Urinalysis; Future; Expected date: 02/19/2024  -     TSH; Future; Expected date: 02/19/2024  Stable continue low fat diet, aspirin and statin  3. HTN (hypertension), benign  -     Comprehensive Metabolic Panel; Future; Expected date: 02/19/2024  -     Lipid Panel; Future; Expected date: 02/19/2024  -     CBC Auto Differential; Future; Expected date: 02/19/2024  -     Urinalysis; Future; Expected date: 02/19/2024  -     TSH; Future; Expected date: 02/19/2024  Stable continue amlodipine and losartan  4. Stage 3 chronic kidney disease, unspecified whether stage 3a or 3b CKD  -     Comprehensive Metabolic Panel; Future; Expected date: 02/19/2024  -     Lipid Panel; Future; Expected date: 02/19/2024  -     CBC Auto Differential; Future; Expected date: 02/19/2024  -     Urinalysis; Future; Expected date: 02/19/2024  -     TSH; Future; Expected date: 02/19/2024  Stable avoid NSAID and cox2 inhibitors, drink plenty of water       Follow up in about 3 months (around 3/19/2024) for Follow Up - Chronic Conditions.    This includes face to face time and non-face to face time preparing to see the patient (eg, review of tests), obtaining and/or reviewing separately obtained history, documenting clinical information in the electronic or other health record, independently interpreting results and communicating results to the patient/family/caregiver, or care coordinator.

## 2023-12-20 PROCEDURE — G0179 MD RECERTIFICATION HHA PT: HCPCS | Mod: ,,, | Performed by: INTERNAL MEDICINE

## 2023-12-29 ENCOUNTER — HOSPITAL ENCOUNTER (INPATIENT)
Facility: HOSPITAL | Age: 88
LOS: 10 days | Discharge: NURSING, REHAB OR LONG-TERM CARE | DRG: 321 | End: 2024-01-08
Attending: EMERGENCY MEDICINE | Admitting: INTERNAL MEDICINE
Payer: MEDICARE

## 2023-12-29 ENCOUNTER — TELEPHONE (OUTPATIENT)
Dept: FAMILY MEDICINE | Facility: CLINIC | Age: 88
End: 2023-12-29
Payer: MEDICARE

## 2023-12-29 DIAGNOSIS — R30.0 DYSURIA: ICD-10-CM

## 2023-12-29 DIAGNOSIS — J18.9 COMMUNITY ACQUIRED PNEUMONIA, UNSPECIFIED LATERALITY: ICD-10-CM

## 2023-12-29 DIAGNOSIS — R06.02 SHORTNESS OF BREATH: ICD-10-CM

## 2023-12-29 DIAGNOSIS — R07.9 CHEST PAIN: ICD-10-CM

## 2023-12-29 DIAGNOSIS — R52 PAIN: ICD-10-CM

## 2023-12-29 DIAGNOSIS — I25.10 CAD (CORONARY ARTERY DISEASE): ICD-10-CM

## 2023-12-29 DIAGNOSIS — R30.0 DYSURIA: Primary | ICD-10-CM

## 2023-12-29 DIAGNOSIS — I21.4 NSTEMI (NON-ST ELEVATED MYOCARDIAL INFARCTION): Primary | ICD-10-CM

## 2023-12-29 LAB
ALBUMIN SERPL-MCNC: 3.4 G/DL (ref 3.4–4.8)
ALBUMIN/GLOB SERPL: 1.1 RATIO (ref 1.1–2)
ALP SERPL-CCNC: 52 UNIT/L (ref 40–150)
ALT SERPL-CCNC: 13 UNIT/L (ref 0–55)
APPEARANCE UR: CLEAR
APTT PPP: 152.8 SECONDS (ref 23.2–33.7)
APTT PPP: >200 SECONDS (ref 23.2–33.7)
AST SERPL-CCNC: 28 UNIT/L (ref 5–34)
AV INDEX (PROSTH): 0.69
AV MEAN GRADIENT: 5 MMHG
AV PEAK GRADIENT: 10 MMHG
AV REGURGITATION PRESSURE HALF TIME: 343 MS
AV VALVE AREA BY VELOCITY RATIO: 1.61 CM²
AV VALVE AREA: 1.56 CM²
AV VELOCITY RATIO: 0.71
BACTERIA #/AREA URNS AUTO: ABNORMAL /HPF
BASOPHILS # BLD AUTO: 0.03 X10(3)/MCL
BASOPHILS NFR BLD AUTO: 0.2 %
BILIRUB SERPL-MCNC: 0.8 MG/DL
BILIRUB UR QL STRIP.AUTO: NEGATIVE
BNP BLD-MCNC: 849.2 PG/ML
BSA FOR ECHO PROCEDURE: 1.66 M2
BUN SERPL-MCNC: 18.5 MG/DL (ref 9.8–20.1)
CALCIUM SERPL-MCNC: 8.1 MG/DL (ref 8.4–10.2)
CHLORIDE SERPL-SCNC: 95 MMOL/L (ref 98–107)
CO2 SERPL-SCNC: 24 MMOL/L (ref 23–31)
COLOR UR AUTO: ABNORMAL
CREAT SERPL-MCNC: 0.86 MG/DL (ref 0.55–1.02)
DOP CALC AO PEAK VEL: 1.55 M/S
DOP CALC AO VTI: 31.9 CM
DOP CALC LVOT AREA: 2.3 CM2
DOP CALC LVOT DIAMETER: 1.7 CM
DOP CALC LVOT PEAK VEL: 1.1 M/S
DOP CALC LVOT STROKE VOLUME: 49.68 CM3
DOP CALC MV VTI: 28.7 CM
DOP CALCLVOT PEAK VEL VTI: 21.9 CM
E WAVE DECELERATION TIME: 177 MSEC
E/A RATIO: 1.07
E/E' RATIO: 18.46 M/S
EOSINOPHIL # BLD AUTO: 0 X10(3)/MCL (ref 0–0.9)
EOSINOPHIL NFR BLD AUTO: 0 %
ERYTHROCYTE [DISTWIDTH] IN BLOOD BY AUTOMATED COUNT: 12 % (ref 11.5–17)
FLUAV AG UPPER RESP QL IA.RAPID: NOT DETECTED
FLUBV AG UPPER RESP QL IA.RAPID: NOT DETECTED
GFR SERPLBLD CREATININE-BSD FMLA CKD-EPI: >60 MLS/MIN/1.73/M2
GLOBULIN SER-MCNC: 3 GM/DL (ref 2.4–3.5)
GLUCOSE SERPL-MCNC: 141 MG/DL (ref 82–115)
GLUCOSE UR QL STRIP.AUTO: NORMAL
HCT VFR BLD AUTO: 43 % (ref 37–47)
HGB BLD-MCNC: 15 G/DL (ref 12–16)
HR MV ECHO: 79 BPM
IMM GRANULOCYTES # BLD AUTO: 0.08 X10(3)/MCL (ref 0–0.04)
IMM GRANULOCYTES NFR BLD AUTO: 0.5 %
INR PPP: 1.2
KETONES UR QL STRIP.AUTO: NEGATIVE
LACTATE SERPL-SCNC: 1.3 MMOL/L (ref 0.5–2.2)
LEFT ATRIUM VOLUME INDEX MOD: 27.5 ML/M2
LEFT ATRIUM VOLUME MOD: 44.8 CM3
LEFT VENTRICLE DIASTOLIC VOLUME INDEX: 37.55 ML/M2
LEFT VENTRICLE DIASTOLIC VOLUME: 61.2 ML
LEFT VENTRICLE SYSTOLIC VOLUME INDEX: 15.7 ML/M2
LEFT VENTRICLE SYSTOLIC VOLUME: 25.6 ML
LEUKOCYTE ESTERASE UR QL STRIP.AUTO: NEGATIVE
LV LATERAL E/E' RATIO: 20 M/S
LV SEPTAL E/E' RATIO: 17.14 M/S
LVOT MG: 3 MMHG
LVOT MV: 0.77 CM/S
LYMPHOCYTES # BLD AUTO: 0.83 X10(3)/MCL (ref 0.6–4.6)
LYMPHOCYTES NFR BLD AUTO: 5.6 %
MCH RBC QN AUTO: 32.3 PG (ref 27–31)
MCHC RBC AUTO-ENTMCNC: 34.9 G/DL (ref 33–36)
MCV RBC AUTO: 92.7 FL (ref 80–94)
MONOCYTES # BLD AUTO: 1 X10(3)/MCL (ref 0.1–1.3)
MONOCYTES NFR BLD AUTO: 6.8 %
MV MEAN GRADIENT: 5 MMHG
MV PEAK A VEL: 1.12 M/S
MV PEAK E VEL: 1.2 M/S
MV PEAK GRADIENT: 8 MMHG
MV VALVE AREA BY CONTINUITY EQUATION: 1.73 CM2
NEUTROPHILS # BLD AUTO: 12.76 X10(3)/MCL (ref 2.1–9.2)
NEUTROPHILS NFR BLD AUTO: 86.9 %
NITRITE UR QL STRIP.AUTO: NEGATIVE
NRBC BLD AUTO-RTO: 0 %
OHS LV EJECTION FRACTION SIMPSONS BIPLANE MOD: 58 %
PH UR STRIP.AUTO: 5.5 [PH]
PISA AR MAX VEL: 3.61 M/S
PISA TR MAX VEL: 2.63 M/S
PLATELET # BLD AUTO: 187 X10(3)/MCL (ref 130–400)
PMV BLD AUTO: 9.2 FL (ref 7.4–10.4)
POTASSIUM SERPL-SCNC: 3.6 MMOL/L (ref 3.5–5.1)
PROT SERPL-MCNC: 6.4 GM/DL (ref 5.8–7.6)
PROT UR QL STRIP.AUTO: ABNORMAL
PROTHROMBIN TIME: 14.8 SECONDS (ref 12.5–14.5)
RA MAJOR: 3.9 CM
RA WIDTH: 2.9 CM
RBC # BLD AUTO: 4.64 X10(6)/MCL (ref 4.2–5.4)
RBC #/AREA URNS AUTO: ABNORMAL /HPF
RBC UR QL AUTO: NEGATIVE
SARS-COV-2 RNA RESP QL NAA+PROBE: NOT DETECTED
SODIUM SERPL-SCNC: 130 MMOL/L (ref 136–145)
SP GR UR STRIP.AUTO: 1.01 (ref 1–1.03)
SQUAMOUS #/AREA URNS LPF: ABNORMAL /HPF
TDI LATERAL: 0.06 M/S
TDI SEPTAL: 0.07 M/S
TDI: 0.07 M/S
TR MAX PG: 28 MMHG
TRICUSPID ANNULAR PLANE SYSTOLIC EXCURSION: 1.98 CM
TROPONIN I SERPL-MCNC: 1.94 NG/ML (ref 0–0.04)
TROPONIN I SERPL-MCNC: 2.13 NG/ML (ref 0–0.04)
TROPONIN I SERPL-MCNC: 2.44 NG/ML (ref 0–0.04)
UROBILINOGEN UR STRIP-ACNC: NORMAL
WBC # SPEC AUTO: 14.7 X10(3)/MCL (ref 4.5–11.5)
WBC #/AREA URNS AUTO: ABNORMAL /HPF

## 2023-12-29 PROCEDURE — 96374 THER/PROPH/DIAG INJ IV PUSH: CPT

## 2023-12-29 PROCEDURE — 85730 THROMBOPLASTIN TIME PARTIAL: CPT | Performed by: INTERNAL MEDICINE

## 2023-12-29 PROCEDURE — 63600175 PHARM REV CODE 636 W HCPCS: Performed by: EMERGENCY MEDICINE

## 2023-12-29 PROCEDURE — 63600175 PHARM REV CODE 636 W HCPCS: Performed by: NURSE PRACTITIONER

## 2023-12-29 PROCEDURE — 84484 ASSAY OF TROPONIN QUANT: CPT

## 2023-12-29 PROCEDURE — 99291 CRITICAL CARE FIRST HOUR: CPT

## 2023-12-29 PROCEDURE — 93005 ELECTROCARDIOGRAM TRACING: CPT

## 2023-12-29 PROCEDURE — 85730 THROMBOPLASTIN TIME PARTIAL: CPT | Performed by: NURSE PRACTITIONER

## 2023-12-29 PROCEDURE — 11000001 HC ACUTE MED/SURG PRIVATE ROOM

## 2023-12-29 PROCEDURE — 85610 PROTHROMBIN TIME: CPT | Performed by: NURSE PRACTITIONER

## 2023-12-29 PROCEDURE — 83605 ASSAY OF LACTIC ACID: CPT | Performed by: EMERGENCY MEDICINE

## 2023-12-29 PROCEDURE — 25000003 PHARM REV CODE 250: Performed by: EMERGENCY MEDICINE

## 2023-12-29 PROCEDURE — 84484 ASSAY OF TROPONIN QUANT: CPT | Performed by: NURSE PRACTITIONER

## 2023-12-29 PROCEDURE — 83880 ASSAY OF NATRIURETIC PEPTIDE: CPT

## 2023-12-29 PROCEDURE — 85025 COMPLETE CBC W/AUTO DIFF WBC: CPT

## 2023-12-29 PROCEDURE — 0240U COVID/FLU A&B PCR: CPT

## 2023-12-29 PROCEDURE — 93005 ELECTROCARDIOGRAM TRACING: CPT | Performed by: INTERNAL MEDICINE

## 2023-12-29 PROCEDURE — 80053 COMPREHEN METABOLIC PANEL: CPT

## 2023-12-29 PROCEDURE — 81001 URINALYSIS AUTO W/SCOPE: CPT | Performed by: INTERNAL MEDICINE

## 2023-12-29 RX ORDER — HEPARIN SODIUM,PORCINE/D5W 25000/250
0-40 INTRAVENOUS SOLUTION INTRAVENOUS CONTINUOUS
Status: DISCONTINUED | OUTPATIENT
Start: 2023-12-29 | End: 2023-12-31

## 2023-12-29 RX ORDER — ACETAMINOPHEN AND CODEINE PHOSPHATE 300; 30 MG/1; MG/1
1 TABLET ORAL DAILY PRN
Status: ON HOLD | COMMUNITY
End: 2024-01-22 | Stop reason: HOSPADM

## 2023-12-29 RX ORDER — ASPIRIN 325 MG
325 TABLET, DELAYED RELEASE (ENTERIC COATED) ORAL
Status: COMPLETED | OUTPATIENT
Start: 2023-12-29 | End: 2023-12-29

## 2023-12-29 RX ADMIN — HEPARIN SODIUM 12 UNITS/KG/HR: 10000 INJECTION, SOLUTION INTRAVENOUS at 06:12

## 2023-12-29 RX ADMIN — AZITHROMYCIN MONOHYDRATE 500 MG: 500 INJECTION, POWDER, LYOPHILIZED, FOR SOLUTION INTRAVENOUS at 03:12

## 2023-12-29 RX ADMIN — ASPIRIN 325 MG: 325 TABLET, COATED ORAL at 03:12

## 2023-12-29 RX ADMIN — CEFTRIAXONE SODIUM 2 G: 2 INJECTION, POWDER, FOR SOLUTION INTRAMUSCULAR; INTRAVENOUS at 03:12

## 2023-12-29 RX ADMIN — HEPARIN SODIUM 12 UNITS/KG/HR: 10000 INJECTION, SOLUTION INTRAVENOUS at 04:12

## 2023-12-29 NOTE — ED PROVIDER NOTES
Encounter Date: 12/29/2023    SCRIBE #1 NOTE: I, Cuca Anshu, am scribing for, and in the presence of,  Tariq Dukes MD. I have scribed the following portions of the note - the EKG reading. Other sections scribed: HPI, ROS, PE.       History     Chief Complaint   Patient presents with    Cough     C/o cough worsening since yesterday with pulse ox reading at 86% on room air per Home Health just PTA. Denies SOB or known fever. O2 89% on RA in triage. 95% on 3L NC.     Patient is an 89 year old female with a history of CKD, GERD, HTN, and HLD that presents to the ED for a worsening cough onset yesterday. Patient's son reports at home pulse ox showed 84% on RA. He reports she complains of chest pain when coughing. He also reports chills, abnormal urine, and being disoriented yesterday. Patient denies headache and fever.    The history is provided by the patient, medical records and a relative. No  was used.   Cough  This is a new problem. The current episode started yesterday. The problem has been gradually worsening. The cough is Productive of sputum. Associated symptoms include chills. Pertinent negatives include no chest pain, no headaches, no rhinorrhea, no shortness of breath and no wheezing. She has tried nothing for the symptoms.     Review of patient's allergies indicates:   Allergen Reactions    Meperidine      Other reaction(s): EXCITATIVE TYPE PSYCHOSIS    Sulfamethoxazole      Past Medical History:   Diagnosis Date    Anxiety 6/6/2022    Carpal tunnel syndrome, bilateral     Chronic back pain     CKD (chronic kidney disease) stage 3, GFR 30-59 ml/min 6/6/2022    Fibromyalgia     GERD with esophagitis 6/6/2022    HTN (hypertension), benign 6/6/2022    Hyperlipidemia 6/6/2022    Hypothyroidism 6/6/2022    Insomnia 6/6/2022    OA (osteoarthritis) 6/6/2022    Osteoporosis     Primary hyperparathyroidism 2/8/2023    Urinary incontinence 8/8/2022     Past Surgical History:   Procedure  Laterality Date    APPENDECTOMY       SECTION      COLONOSCOPY      HYSTERECTOMY      TOTAL KNEE ARTHROPLASTY       Family History   Problem Relation Age of Onset    No Known Problems Mother     No Known Problems Father      Social History     Tobacco Use    Smoking status: Never    Smokeless tobacco: Never   Substance Use Topics    Alcohol use: Never    Drug use: Never     Review of Systems   Constitutional:  Positive for chills. Negative for fatigue, fever and unexpected weight change.   HENT:  Negative for congestion and rhinorrhea.    Eyes:  Negative for pain.   Respiratory:  Positive for cough. Negative for chest tightness, shortness of breath and wheezing.    Cardiovascular:  Negative for chest pain.   Gastrointestinal:  Negative for abdominal pain, constipation, diarrhea, nausea and vomiting.   Genitourinary:  Negative for dysuria.        Abnormal urine   Musculoskeletal:  Negative for back pain and neck pain.   Skin:  Negative for rash.   Allergic/Immunologic: Negative for environmental allergies, food allergies and immunocompromised state.   Neurological:  Negative for dizziness, speech difficulty and headaches.        Disoriented     Hematological:  Does not bruise/bleed easily.   Psychiatric/Behavioral:  Negative for sleep disturbance and suicidal ideas.        Physical Exam     Initial Vitals [23 1154]   BP Pulse Resp Temp SpO2   119/74 80 20 98.7 °F (37.1 °C) (!) 89 %      MAP       --         Physical Exam    Nursing note and vitals reviewed.  Constitutional: No distress.   Weak. Frail. Elderly.    HENT:   Head: Normocephalic and atraumatic.   Eyes: EOM are normal. Pupils are equal, round, and reactive to light.   Neck: Trachea normal. Neck supple.   Normal range of motion.  Cardiovascular:  Normal rate and regular rhythm.           No murmur heard.  Pulmonary/Chest: No respiratory distress.   Rales bilaterally.    Abdominal: Abdomen is soft. Bowel sounds are normal. She exhibits no  distension. There is no abdominal tenderness.   Musculoskeletal:         General: Normal range of motion.      Cervical back: Normal range of motion and neck supple.      Lumbar back: Normal range of motion.     Neurological: She is alert. She has normal strength. No cranial nerve deficit.   Skin: Skin is warm and dry. No rash noted.         ED Course   Critical Care    Date/Time: 12/29/2023 3:26 PM    Performed by: Tariq Dukes III, MD  Authorized by: Tariq Dukes III, MD  Direct patient critical care time: 19 minutes  Documentation critical care time: 5 minutes  Consulting other physicians critical care time: 8 minutes  Total critical care time (exclusive of procedural time) : 32 minutes  Critical care was necessary to treat or prevent imminent or life-threatening deterioration of the following conditions: cardiac failure.  Critical care was time spent personally by me on the following activities: discussions with primary provider, discussions with consultants, examination of patient, re-evaluation of patient's condition, review of old charts, ordering and review of laboratory studies and ordering and performing treatments and interventions.        Labs Reviewed   COMPREHENSIVE METABOLIC PANEL - Abnormal; Notable for the following components:       Result Value    Sodium Level 130 (*)     Chloride 95 (*)     Glucose Level 141 (*)     Calcium Level Total 8.1 (*)     All other components within normal limits   B-TYPE NATRIURETIC PEPTIDE - Abnormal; Notable for the following components:    Natriuretic Peptide 849.2 (*)     All other components within normal limits   TROPONIN I - Abnormal; Notable for the following components:    Troponin-I 1.936 (*)     All other components within normal limits   CBC WITH DIFFERENTIAL - Abnormal; Notable for the following components:    WBC 14.70 (*)     MCH 32.3 (*)     Neut # 12.76 (*)     IG# 0.08 (*)     All other components within normal limits   URINALYSIS, REFLEX TO  URINE CULTURE - Abnormal; Notable for the following components:    Protein, UA Trace (*)     Bacteria, UA Moderate (*)     All other components within normal limits   COVID/FLU A&B PCR - Normal    Narrative:     The Xpert Xpress SARS-CoV-2/FLU/RSV plus is a rapid, multiplexed real-time PCR test intended for the simultaneous qualitative detection and differentiation of SARS-CoV-2, Influenza A, Influenza B, and respiratory syncytial virus (RSV) viral RNA in either nasopharyngeal swab or nasal swab specimens.         CBC W/ AUTO DIFFERENTIAL    Narrative:     The following orders were created for panel order CBC Auto Differential.  Procedure                               Abnormality         Status                     ---------                               -----------         ------                     CBC with Differential[[id number]]       Abnormal            Final result                 Please view results for these tests on the individual orders.   LACTIC ACID, PLASMA   APTT   PROTIME-INR     EKG Readings: (Independently Interpreted)   Initial Reading: No STEMI. Rhythm: Normal Sinus Rhythm. Heart Rate: 93. Ectopy: No Ectopy. Conduction: RBBB. ST Segments: Normal ST Segments. T Waves: Normal. Clinical Impression: Normal Sinus Rhythm   EKG performed at 1154.       Imaging Results              X-Ray Chest 1 View (Final result)  Result time 12/29/23 12:13:33      Final result by Javier Mcneil MD (12/29/23 12:13:33)                   Impression:      Minimal blunting of the right costophrenic angle as above.    Slightly more confluent opacities in the right cardiophrenic angle region early infiltrate can not be completely excluded.    Otherwise no active pulmonary disease and no change as compared with previous exam      Electronically signed by: Javier Mcneil  Date:    12/29/2023  Time:    12:13               Narrative:    EXAMINATION:  XR CHEST 1 VIEW    CPT 62912    CLINICAL HISTORY:  shortness of  breath;    COMPARISON:  July 6, 2020    FINDINGS:  Examination reveals mediastinal cardiac silhouette to be within normal limits left lung field is clear and free of gross infiltrates atelectasis or effusions.    Slightly more confluent opacities identified in the right cardiophrenic angle region early infiltrate can not be completely excluded.    There might be minimal blunting of the right costophrenic angle which may indicate the presence of a small right-sided pleural effusion                                       Medications   cefTRIAXone (ROCEPHIN) 2 g in dextrose 5 % in water (D5W) 100 mL IVPB (MB+) (2 g Intravenous New Bag 12/29/23 1502)   azithromycin 500 mg in dextrose 5 % 250 mL IVPB (ready to mix) (has no administration in time range)   heparin 25,000 units in dextrose 5% (100 units/ml) IV bolus from bag INITIAL BOLUS (max bolus 4000 units) (has no administration in time range)   heparin 25,000 units in dextrose 5% 250 mL (100 units/mL) infusion LOW INTENSITY nomogram - LAF (has no administration in time range)   heparin 25,000 units in dextrose 5% (100 units/ml) IV bolus from bag - ADDITIONAL PRN BOLUS - 60 units/kg (max bolus 4000 units) (has no administration in time range)   heparin 25,000 units in dextrose 5% (100 units/ml) IV bolus from bag - ADDITIONAL PRN BOLUS - 30 units/kg (max bolus 4000 units) (has no administration in time range)   aspirin EC tablet 325 mg (325 mg Oral Given 12/29/23 1503)     Medical Decision Making  Differential diagnosis includes, but is not limited to, flu, COVID, pneumonia, bacteremia, and acute coronary syndrome.      CBC with a leukocytosis  chest x-ray with come fluid infiltrates to the right lower lung field that are faint but given patient's cough and hypoxia will treat for community-acquired pneumonia no risk factors for healthcare acquired pneumonia was given Rocephin Zithromax patient with hypoxia but not hypoxic care with placed on 2 L. Patient EKG without  abnormality but troponin of 1.9 discussed case with RT with cardiovascular [facility] [place] will started on aspirin and admit to hospital medicine and trend out discussed case with [facility]    Problems Addressed:  Community acquired pneumonia, unspecified laterality: complicated acute illness or injury  NSTEMI (non-ST elevated myocardial infarction): complicated acute illness or injury that poses a threat to life or bodily functions    Amount and/or Complexity of Data Reviewed  Independent Historian:      Details: Patient's son acts as the primary historian by providing the collateral history.   Labs: ordered.  Radiology: ordered and independent interpretation performed.  ECG/medicine tests: ordered and independent interpretation performed.    Risk  OTC drugs.            Scribe Attestation:   Scribe #1: I performed the above scribed service and the documentation accurately describes the services I performed. I attest to the accuracy of the note.    Attending Attestation:           Physician Attestation for Scribe:  Physician Attestation Statement for Scribe #1: I, Tariq Dukes MD, reviewed documentation, as scribed by Cuca Brasher in my presence, and it is both accurate and complete.             ED Course as of 12/29/23 1527   Fri Dec 29, 2023   1450 Paged hospital medicine  [ED]   1459  Discussed case with the RT ([facility]) they will see in consult, states start aspirin trend inside out [FK]   1510 Spoke with hospital medicine  [ED]      ED Course User Index  [ED] Cuca Brasher  [FK] Tariq Dukes III, MD                             Clinical Impression:  Final diagnoses:  [R06.02] Shortness of breath  [I21.4] NSTEMI (non-ST elevated myocardial infarction) (Primary)  [J18.9] Community acquired pneumonia, unspecified laterality          ED Disposition Condition    Admit Stable                Tariq Dukes III, MD  12/29/23 2927

## 2023-12-29 NOTE — TELEPHONE ENCOUNTER
Spoke with Lexie with [facility]  Pts 02 was reading at 85  Advised pt to go to ED asap  Expressed understanding

## 2023-12-29 NOTE — TELEPHONE ENCOUNTER
Pts son presented to clinic with urine sample from pt  Brought pts son to the back to discuss  Pts son is concerned due to pt having a cough and stated that yesterday pt was confused and not acting like herself  Order placed for UA   Pts son stated that pts cough is breaking up   Advised pts son to continue mucinex  Also advised if symptoms get worst go to [facility]  Pts son stated he just got over Covid last week  Denies pt having fever only a cough

## 2023-12-29 NOTE — FIRST PROVIDER EVALUATION
"Medical screening examination initiated.  I have conducted a focused provider triage encounter, findings are as follows:    Brief history of present illness:  arrived to ED due to hypoxia, cough, and congestion. Symptoms began on yesterday. H/H nurse checked O2 and it was 84%. Denies respiratory illness.     Vitals:    12/29/23 1154   BP: 119/74   Pulse: 80   Resp: 20   Temp: 98.7 °F (37.1 °C)   TempSrc: Oral   SpO2: (!) 89%   Weight: 64.4 kg (142 lb)   Height: 5' 1" (1.549 m)       Pertinent physical exam:  awake, alert, has non-labored breathing, in wheelchair. 02 sat 90%. Placed on 2L.     Brief workup plan:  labs, EKG, imaging     Preliminary workup initiated; this workup will be continued and followed by the physician or advanced practice provider that is assigned to the patient when roomed.  "

## 2023-12-29 NOTE — Clinical Note
A percutaneous stick to the right femoral artery was performed. Ultrasound guidance was used to obtain access.
All catheters were removed. 
All wires were removed. 
An angiography of the  right femoral artery was performed to evaluate for the placement of a closure device.
An angiography was performed of the left coronary arteries. The angiography was performed via power injection.
ID band present and verified.
Phone report was given to 6th floor RN
The catheter was inserted into the, was removed from the and was inserted over the wire into the ostium   left main.
The catheter was inserted into the, was removed from the and was inserted over the wire into the ostium   left main. Hemodynamics were performed.  An angiography was performed of the right coronary arteries. Multiple views were taken. The angiography was performed via power injection.  JL4
The catheter was inserted into the, was removed from the and was inserted over the wire into the ostium   right coronary artery. An angiography was performed of the right coronary arteries. Multiple views were taken. The angiography was performed via power injection.  RASHAD
The catheter was inserted into the, was removed from the and was inserted over the wire into the proximal   left anterior descending. IVUS performed
The closure device was deployed in the right femoral artery.
The defib pads were placed on the patient
The groin was prepped. The site was prepped with ChloraPrep. The patient was draped.
The procedural consent was signed.
The sheath was exchanged in the right femoral artery.
The sheath was removed from the right femoral artery.
The sheath was removed from the right femoral artery.
The wire was inserted into the aorta.
The wire was inserted into the distal left anterior descending artery.
dry, intact, no bleeding and no hematoma.
all other ROS negative except as per HPI

## 2023-12-29 NOTE — CONSULTS
"Inpatient consult to Cardiology  Consult performed by: Bakari Murillo ANP  Consult ordered by: Bakari Murillo ANP  Reason for consult: NSTEMI          [facility]    Cardiology  Consult Note    Patient Name: Edwin Boyer  MRN: [id number]  Admission Date: 2023  Hospital Length of Stay: 0 days  Code Status: No Order   Attending Provider: Tariq Dukes III, MD   Consulting Provider: BERRY Rivera  Primary Care Physician: Mariely Soto MD  Principal Problem:<principal problem not specified>    Patient information was obtained from patient, past medical records, and ER records.     Subjective:     Chief Complaint: Reason for Consult: Elevated Troponin      HPI: Ms. Boyer is an 89 y/o female who is known to [facility], Dr. Guidry. The patient presented to [facility] ER on 23 with c/o a Cough. She reported that about 2 days prior she developed a cough which progressively worsened and her Pulse Oxymetry at Home was 86% on RA. She denied CP, Fever, Chills and SOB. Reported Mild SOB at Rest. Secondary to these findings and symptoms she presented to the ER for evaluation. Upon Evaluation in the ER she was found to have an RA O2 saturation in the Low 80s and began to endorse CP with a Cough. The CP was not presented unless she was coughing. The Pain was 8/10 on a verbal scale with coughing and described as a "sharp pain". She reported that the cough was productive and yielded a yellow/green sputum. In the ER she was found to have a WBC 14.70, Na 130, K 3.6, Cl 95, .2, Troponin 1.936, and Negative Flu A/B and COVID Screenings. She was started on IV Abx and ASA 325mg x 1. [facility] has been consulted for NSTEMI in the Setting of Pneumonia.     PMH: Anxiety, Carpel Tunnel Syndrome Bilaterally, CKD Stage III, Fibromyalgia, GERD, HTN, HLD, Hypothyroidism, OA, Osteoporosis, Hyperparathyroidism, Urinary Incontinence, VHD (Mod MR)  PSH: Appendectomy, , Colonoscopy, EMERSON, TKA, Breast Biopsy, "   Family History: Father, D, H; Mother, D, HF  Social History: Denies Illicit Drug, ETOH and Tobacco Use    Previous Cardiac Diagnostics:   ECHO 5.3.23:  The study quality is average.   The left ventricle is normal in size. Global left ventricular systolic function is normal. The left ventricular ejection fraction is 60%. Left ventricular diastolic function is normal. Mild asymmetric septal left ventricular hypertrophy is present.  The left atrial diameter is mildly increased.  Moderate (2+) mitral regurgitation. PISA is 0.54 cm. The MR volume color doppler method is 20 ml. EROA measures 0.1 cm². Vena contracta is 4.5 mm.   Mild (1+) tricuspid regurgitation. Mild (1+) aortic regurgitation. Mild (1+) pulmonic regurgitation.   The estimated pulmonary artery systolic pressure is 39 mmHg assuming a right atrial pressure of 3 mmHg.     Calcium Score 5.18.18:  Total Calcium Score 299    MPI 10.26.11:  This is a normal perfusion study, no perfusion defects noted.   This scan is suggestive of low risk for future cardiovascular events.  The left ventricular cavity is noted to be normal on the stress study. The left ventricular ejection fraction was calculated to be 74% and left ventricular global function is normal.   The study quality is good.    Review of patient's allergies indicates:   Allergen Reactions    Meperidine      Other reaction(s): EXCITATIVE TYPE PSYCHOSIS    Sulfamethoxazole      No current facility-administered medications on file prior to encounter.     Current Outpatient Medications on File Prior to Encounter   Medication Sig    amLODIPine (NORVASC) 10 MG tablet Take 1 tablet (10 mg total) by mouth once daily.    aspirin (ECOTRIN) 81 MG EC tablet TAKE 1 TABLET EVERY DAY    cinacalcet (SENSIPAR) 30 MG Tab Take 1 tablet (30 mg total) by mouth once daily.    DULoxetine (CYMBALTA) 30 MG capsule Take 30 mg by mouth once daily.    DULoxetine (CYMBALTA) 60 MG capsule Take 1 capsule (60 mg total) by mouth every  morning.    fluticasone propionate (FLONASE) 50 mcg/actuation nasal spray 2 sprays by Each Nostril route.    gabapentin (NEURONTIN) 300 MG capsule Take 1 capsule (300 mg total) by mouth 2 (two) times daily.    levothyroxine (SYNTHROID) 75 MCG tablet Take 1 tablet (75 mcg total) by mouth once daily.    losartan (COZAAR) 25 MG tablet Take 0.5 tablets (12.5 mg total) by mouth once daily.    metoprolol succinate (TOPROL-XL) 25 MG 24 hr tablet Take 1 tablet (25 mg total) by mouth 2 (two) times daily.    oxybutynin (DITROPAN-XL) 10 MG 24 hr tablet Take 10 mg by mouth once daily.    pravastatin (PRAVACHOL) 20 MG tablet Take 20 mg by mouth once daily.    traZODone (DESYREL) 100 MG tablet Take 1 tablet (100 mg total) by mouth every evening.     Review of Systems   Constitutional:  Positive for fatigue. Negative for chills and fever.   Respiratory:  Positive for shortness of breath.    Cardiovascular:  Positive for chest pain (Associated with Cough). Negative for palpitations and leg swelling.   All other systems reviewed and are negative.    Objective:     Vital Signs (Most Recent):  Temp: 98.7 °F (37.1 °C) (12/29/23 1154)  Pulse: 87 (12/29/23 1402)  Resp: 16 (12/29/23 1402)  BP: (!) 125/54 (12/29/23 1402)  SpO2: 95 % (12/29/23 1402) Vital Signs (24h Range):  Temp:  [98.7 °F (37.1 °C)] 98.7 °F (37.1 °C)  Pulse:  [80-87] 87  Resp:  [16-20] 16  SpO2:  [89 %-95 %] 95 %  BP: (100-125)/(54-80) 125/54     Weight: 64.4 kg (142 lb)  Body mass index is 26.83 kg/m².    SpO2: 95 %       No intake or output data in the 24 hours ending 12/29/23 1459    Lines/Drains/Airways       Peripheral Intravenous Line  Duration                  Peripheral IV - Single Lumen 12/29/23 1312 20 G Anterior;Right Hand <1 day                  Significant Labs:  Recent Results (from the past 72 hour(s))   COVID/FLU A&B PCR    Collection Time: 12/29/23 11:55 AM   Result Value Ref Range    Influenza A PCR Not Detected Not Detected    Influenza B PCR Not  Detected Not Detected    SARS-CoV-2 PCR Not Detected Not Detected, Negative   Comprehensive Metabolic Panel    Collection Time: 12/29/23 12:15 PM   Result Value Ref Range    Sodium Level 130 (L) 136 - 145 mmol/L    Potassium Level 3.6 3.5 - 5.1 mmol/L    Chloride 95 (L) 98 - 107 mmol/L    Carbon Dioxide 24 23 - 31 mmol/L    Glucose Level 141 (H) 82 - 115 mg/dL    Blood Urea Nitrogen 18.5 9.8 - 20.1 mg/dL    Creatinine 0.86 0.55 - 1.02 mg/dL    Calcium Level Total 8.1 (L) 8.4 - 10.2 mg/dL    Protein Total 6.4 5.8 - 7.6 gm/dL    Albumin Level 3.4 3.4 - 4.8 g/dL    Globulin 3.0 2.4 - 3.5 gm/dL    Albumin/Globulin Ratio 1.1 1.1 - 2.0 ratio    Bilirubin Total 0.8 <=1.5 mg/dL    Alkaline Phosphatase 52 40 - 150 unit/L    Alanine Aminotransferase 13 0 - 55 unit/L    Aspartate Aminotransferase 28 5 - 34 unit/L    eGFR >60 mls/min/1.73/m2   Troponin I    Collection Time: 12/29/23 12:15 PM   Result Value Ref Range    Troponin-I 1.936 (H) 0.000 - 0.045 ng/mL   BNP    Collection Time: 12/29/23 12:16 PM   Result Value Ref Range    Natriuretic Peptide 849.2 (H) <=100.0 pg/mL   CBC with Differential    Collection Time: 12/29/23 12:16 PM   Result Value Ref Range    WBC 14.70 (H) 4.50 - 11.50 x10(3)/mcL    RBC 4.64 4.20 - 5.40 x10(6)/mcL    Hgb 15.0 12.0 - 16.0 g/dL    Hct 43.0 37.0 - 47.0 %    MCV 92.7 80.0 - 94.0 fL    MCH 32.3 (H) 27.0 - 31.0 pg    MCHC 34.9 33.0 - 36.0 g/dL    RDW 12.0 11.5 - 17.0 %    Platelet 187 130 - 400 x10(3)/mcL    MPV 9.2 7.4 - 10.4 fL    Neut % 86.9 %    Lymph % 5.6 %    Mono % 6.8 %    Eos % 0.0 %    Basophil % 0.2 %    Lymph # 0.83 0.6 - 4.6 x10(3)/mcL    Neut # 12.76 (H) 2.1 - 9.2 x10(3)/mcL    Mono # 1.00 0.1 - 1.3 x10(3)/mcL    Eos # 0.00 0 - 0.9 x10(3)/mcL    Baso # 0.03 <=0.2 x10(3)/mcL    IG# 0.08 (H) 0 - 0.04 x10(3)/mcL    IG% 0.5 %    NRBC% 0.0 %   Urinalysis, Reflex to Urine Culture    Collection Time: 12/29/23  1:33 PM    Specimen: Urine   Result Value Ref Range    Color, UA Light-Yellow  Yellow, Light-Yellow, Colorless, Straw, Dark-Yellow    Appearance, UA Clear Clear    Specific Gravity, UA 1.010 1.005 - 1.030    pH, UA 5.5 5.0 - 8.5    Protein, UA Trace (A) Negative    Glucose, UA Normal Negative, Normal    Ketones, UA Negative Negative    Blood, UA Negative Negative    Bilirubin, UA Negative Negative    Urobilinogen, UA Normal 0.2, 1.0, Normal    Nitrites, UA Negative Negative    Leukocyte Esterase, UA Negative Negative    WBC, UA 0-5 None Seen, 0-2, 3-5, 0-5 /HPF    Bacteria, UA Moderate (A) None Seen, Trace /HPF    Squamous Epithelial Cells, UA Trace None Seen /HPF    RBC, UA 0-5 None Seen, 0-2, 3-5, 0-5 /HPF     Significant Imaging:  Imaging Results              X-Ray Chest 1 View (Final result)  Result time 12/29/23 12:13:33      Final result by Javier Mcneil MD (12/29/23 12:13:33)                   Impression:      Minimal blunting of the right costophrenic angle as above.    Slightly more confluent opacities in the right cardiophrenic angle region early infiltrate can not be completely excluded.    Otherwise no active pulmonary disease and no change as compared with previous exam      Electronically signed by: Javier Mcneil  Date:    12/29/2023  Time:    12:13               Narrative:    EXAMINATION:  XR CHEST 1 VIEW    CPT 36966    CLINICAL HISTORY:  shortness of breath;    COMPARISON:  July 6, 2020    FINDINGS:  Examination reveals mediastinal cardiac silhouette to be within normal limits left lung field is clear and free of gross infiltrates atelectasis or effusions.    Slightly more confluent opacities identified in the right cardiophrenic angle region early infiltrate can not be completely excluded.    There might be minimal blunting of the right costophrenic angle which may indicate the presence of a small right-sided pleural effusion                                    EKG: Reviewed    Telemetry: SR     Physical Exam  Constitutional:       General: She is not in acute  distress.     Appearance: Normal appearance.   HENT:      Head: Normocephalic.      Mouth/Throat:      Mouth: Mucous membranes are moist.   Eyes:      Extraocular Movements: Extraocular movements intact.      Conjunctiva/sclera: Conjunctivae normal.   Cardiovascular:      Rate and Rhythm: Normal rate and regular rhythm.      Pulses: Normal pulses.      Heart sounds: Murmur: 2/6 TU.   Pulmonary:      Effort: Pulmonary effort is normal. No respiratory distress.      Breath sounds: Rhonchi present.      Comments: NC O2  Abdominal:      Palpations: Abdomen is soft.   Musculoskeletal:         General: No swelling. Normal range of motion.   Skin:     General: Skin is warm and dry.   Neurological:      General: No focal deficit present.      Mental Status: She is alert and oriented to person, place, and time.   Psychiatric:         Mood and Affect: Mood normal.         Behavior: Behavior normal.         Judgment: Judgment normal.       Home Medications:   No current facility-administered medications on file prior to encounter.     Current Outpatient Medications on File Prior to Encounter   Medication Sig Dispense Refill    amLODIPine (NORVASC) 10 MG tablet Take 1 tablet (10 mg total) by mouth once daily. 90 tablet 3    aspirin (ECOTRIN) 81 MG EC tablet TAKE 1 TABLET EVERY DAY 90 tablet 3    cinacalcet (SENSIPAR) 30 MG Tab Take 1 tablet (30 mg total) by mouth once daily. 30 tablet 3    DULoxetine (CYMBALTA) 30 MG capsule Take 30 mg by mouth once daily.      DULoxetine (CYMBALTA) 60 MG capsule Take 1 capsule (60 mg total) by mouth every morning. 30 capsule 11    fluticasone propionate (FLONASE) 50 mcg/actuation nasal spray 2 sprays by Each Nostril route.      gabapentin (NEURONTIN) 300 MG capsule Take 1 capsule (300 mg total) by mouth 2 (two) times daily. 60 capsule 11    levothyroxine (SYNTHROID) 75 MCG tablet Take 1 tablet (75 mcg total) by mouth once daily. 90 tablet 3    losartan (COZAAR) 25 MG tablet Take 0.5 tablets  (12.5 mg total) by mouth once daily. 45 tablet 3    metoprolol succinate (TOPROL-XL) 25 MG 24 hr tablet Take 1 tablet (25 mg total) by mouth 2 (two) times daily. 180 tablet 1    oxybutynin (DITROPAN-XL) 10 MG 24 hr tablet Take 10 mg by mouth once daily.      pravastatin (PRAVACHOL) 20 MG tablet Take 20 mg by mouth once daily.      traZODone (DESYREL) 100 MG tablet Take 1 tablet (100 mg total) by mouth every evening. 30 tablet 11     Current Inpatient Medications:    Current Facility-Administered Medications:     aspirin EC tablet 325 mg, 325 mg, Oral, ED 1 Time, Tariq Dukes III, MD    azithromycin 500 mg in dextrose 5 % 250 mL IVPB (ready to mix), 500 mg, Intravenous, ED 1 Time, Tariq Dukes III, MD    cefTRIAXone (ROCEPHIN) 2 g in dextrose 5 % in water (D5W) 100 mL IVPB (MB+), 2 g, Intravenous, ED 1 Time, Tariq Dukes III, MD    Current Outpatient Medications:     amLODIPine (NORVASC) 10 MG tablet, Take 1 tablet (10 mg total) by mouth once daily., Disp: 90 tablet, Rfl: 3    aspirin (ECOTRIN) 81 MG EC tablet, TAKE 1 TABLET EVERY DAY, Disp: 90 tablet, Rfl: 3    cinacalcet (SENSIPAR) 30 MG Tab, Take 1 tablet (30 mg total) by mouth once daily., Disp: 30 tablet, Rfl: 3    DULoxetine (CYMBALTA) 30 MG capsule, Take 30 mg by mouth once daily., Disp: , Rfl:     DULoxetine (CYMBALTA) 60 MG capsule, Take 1 capsule (60 mg total) by mouth every morning., Disp: 30 capsule, Rfl: 11    fluticasone propionate (FLONASE) 50 mcg/actuation nasal spray, 2 sprays by Each Nostril route., Disp: , Rfl:     gabapentin (NEURONTIN) 300 MG capsule, Take 1 capsule (300 mg total) by mouth 2 (two) times daily., Disp: 60 capsule, Rfl: 11    levothyroxine (SYNTHROID) 75 MCG tablet, Take 1 tablet (75 mcg total) by mouth once daily., Disp: 90 tablet, Rfl: 3    losartan (COZAAR) 25 MG tablet, Take 0.5 tablets (12.5 mg total) by mouth once daily., Disp: 45 tablet, Rfl: 3    metoprolol succinate (TOPROL-XL) 25 MG 24 hr tablet, Take 1 tablet  (25 mg total) by mouth 2 (two) times daily., Disp: 180 tablet, Rfl: 1    oxybutynin (DITROPAN-XL) 10 MG 24 hr tablet, Take 10 mg by mouth once daily., Disp: , Rfl:     pravastatin (PRAVACHOL) 20 MG tablet, Take 20 mg by mouth once daily., Disp: , Rfl:     traZODone (DESYREL) 100 MG tablet, Take 1 tablet (100 mg total) by mouth every evening., Disp: 30 tablet, Rfl: 11    VTE Risk Mitigation (From admission, onward)      None          Assessment:   NSTEMI - Type Unspecified    - Coronary Calcium Score 5.18.18 - Total 229    - MPI (2011) - Normal Perfusion Study, No Perfusion Defects Noted  CAP/Leukocytosis  Cough 2/2 Above  CP 2/2 Above - Atypical   HTN  HLD  Anxiety  GERD  Fibromyalgia  OA  Hypothyroidism  No Hx of GIB    Plan:   Treatment of Pneumonitis per Primary Team  ECHO Today  Trend Cardiac Enzymes  Heparin Bolus and Drip per Protocol for Diagnosis of NSTEMI  EKG Reviewed  Plan for Ischemic Workup prior to D/C and Tx of CAP Started  Labs and EKG in AM: CBC, CMP and Mg    Thank you for your consult.     Bakari Murillo, ANP  Cardiology  [facility]  12/29/2023  I have seen the patient, reviewed the Nurse Practitioner's note, assessment and plan. I have personally interviewed and examined the patient at bedside and agree with the findings. Medical decision making listed above were done under my guidance.    Physical exam:  Cardiovascular system: regular rhythm, no murmur.  Lungs: CTAB.  Extremities: No leg edema.    Plan:  Non Q MI  Medical management for now

## 2023-12-30 PROBLEM — I21.4 NSTEMI (NON-ST ELEVATED MYOCARDIAL INFARCTION): Status: ACTIVE | Noted: 2023-12-30

## 2023-12-30 LAB
ALBUMIN SERPL-MCNC: 2.9 G/DL (ref 3.4–4.8)
ALBUMIN/GLOB SERPL: 1 RATIO (ref 1.1–2)
ALP SERPL-CCNC: 51 UNIT/L (ref 40–150)
ALT SERPL-CCNC: 13 UNIT/L (ref 0–55)
APTT PPP: 35.3 SECONDS (ref 23.2–33.7)
APTT PPP: 36.2 SECONDS (ref 23.2–33.7)
APTT PPP: 37.3 SECONDS (ref 23.2–33.7)
APTT PPP: 48.1 SECONDS (ref 23.2–33.7)
APTT PPP: 48.2 SECONDS (ref 23.2–33.7)
APTT PPP: >200 SECONDS (ref 23.2–33.7)
AST SERPL-CCNC: 32 UNIT/L (ref 5–34)
B PERT.PT PRMT NPH QL NAA+NON-PROBE: NOT DETECTED
BASOPHILS # BLD AUTO: 0.02 X10(3)/MCL
BASOPHILS NFR BLD AUTO: 0.2 %
BILIRUB SERPL-MCNC: 0.6 MG/DL
BUN SERPL-MCNC: 17.2 MG/DL (ref 9.8–20.1)
C PNEUM DNA NPH QL NAA+NON-PROBE: NOT DETECTED
CALCIUM SERPL-MCNC: 7.6 MG/DL (ref 8.4–10.2)
CHLORIDE SERPL-SCNC: 97 MMOL/L (ref 98–107)
CO2 SERPL-SCNC: 23 MMOL/L (ref 23–31)
CREAT SERPL-MCNC: 0.7 MG/DL (ref 0.55–1.02)
EOSINOPHIL # BLD AUTO: 0 X10(3)/MCL (ref 0–0.9)
EOSINOPHIL NFR BLD AUTO: 0 %
ERYTHROCYTE [DISTWIDTH] IN BLOOD BY AUTOMATED COUNT: 12 % (ref 11.5–17)
GFR SERPLBLD CREATININE-BSD FMLA CKD-EPI: >60 MLS/MIN/1.73/M2
GLOBULIN SER-MCNC: 2.8 GM/DL (ref 2.4–3.5)
GLUCOSE SERPL-MCNC: 93 MG/DL (ref 82–115)
HADV DNA NPH QL NAA+NON-PROBE: DETECTED
HCOV 229E RNA NPH QL NAA+NON-PROBE: NOT DETECTED
HCOV HKU1 RNA NPH QL NAA+NON-PROBE: NOT DETECTED
HCOV NL63 RNA NPH QL NAA+NON-PROBE: NOT DETECTED
HCOV OC43 RNA NPH QL NAA+NON-PROBE: NOT DETECTED
HCT VFR BLD AUTO: 42.6 % (ref 37–47)
HGB BLD-MCNC: 14.7 G/DL (ref 12–16)
HMPV RNA NPH QL NAA+NON-PROBE: NOT DETECTED
HPIV1 RNA NPH QL NAA+NON-PROBE: NOT DETECTED
HPIV2 RNA NPH QL NAA+NON-PROBE: NOT DETECTED
HPIV3 RNA NPH QL NAA+NON-PROBE: NOT DETECTED
HPIV4 RNA NPH QL NAA+NON-PROBE: NOT DETECTED
IMM GRANULOCYTES # BLD AUTO: 0.07 X10(3)/MCL (ref 0–0.04)
IMM GRANULOCYTES NFR BLD AUTO: 0.6 %
LYMPHOCYTES # BLD AUTO: 1.14 X10(3)/MCL (ref 0.6–4.6)
LYMPHOCYTES NFR BLD AUTO: 9.2 %
M PNEUMO DNA NPH QL NAA+NON-PROBE: NOT DETECTED
MAGNESIUM SERPL-MCNC: 1.4 MG/DL (ref 1.6–2.6)
MCH RBC QN AUTO: 31.8 PG (ref 27–31)
MCHC RBC AUTO-ENTMCNC: 34.5 G/DL (ref 33–36)
MCV RBC AUTO: 92.2 FL (ref 80–94)
MONOCYTES # BLD AUTO: 0.73 X10(3)/MCL (ref 0.1–1.3)
MONOCYTES NFR BLD AUTO: 5.9 %
MRSA PCR SCRN (OHS): NOT DETECTED
NEUTROPHILS # BLD AUTO: 10.47 X10(3)/MCL (ref 2.1–9.2)
NEUTROPHILS NFR BLD AUTO: 84.1 %
NRBC BLD AUTO-RTO: 0 %
PLATELET # BLD AUTO: 193 X10(3)/MCL (ref 130–400)
PMV BLD AUTO: 9.3 FL (ref 7.4–10.4)
POTASSIUM SERPL-SCNC: 3.6 MMOL/L (ref 3.5–5.1)
PROT SERPL-MCNC: 5.7 GM/DL (ref 5.8–7.6)
RBC # BLD AUTO: 4.62 X10(6)/MCL (ref 4.2–5.4)
RSV RNA NPH QL NAA+NON-PROBE: NOT DETECTED
RV+EV RNA NPH QL NAA+NON-PROBE: NOT DETECTED
SODIUM SERPL-SCNC: 131 MMOL/L (ref 136–145)
TROPONIN I SERPL-MCNC: 1.55 NG/ML (ref 0–0.04)
TROPONIN I SERPL-MCNC: NORMAL NG/ML
WBC # SPEC AUTO: 12.43 X10(3)/MCL (ref 4.5–11.5)

## 2023-12-30 PROCEDURE — 87798 DETECT AGENT NOS DNA AMP: CPT | Performed by: INTERNAL MEDICINE

## 2023-12-30 PROCEDURE — 63600175 PHARM REV CODE 636 W HCPCS: Performed by: INTERNAL MEDICINE

## 2023-12-30 PROCEDURE — 84484 ASSAY OF TROPONIN QUANT: CPT | Performed by: NURSE PRACTITIONER

## 2023-12-30 PROCEDURE — 93005 ELECTROCARDIOGRAM TRACING: CPT | Performed by: INTERNAL MEDICINE

## 2023-12-30 PROCEDURE — 84484 ASSAY OF TROPONIN QUANT: CPT | Performed by: INTERNAL MEDICINE

## 2023-12-30 PROCEDURE — 85025 COMPLETE CBC W/AUTO DIFF WBC: CPT | Performed by: NURSE PRACTITIONER

## 2023-12-30 PROCEDURE — 87641 MR-STAPH DNA AMP PROBE: CPT | Performed by: INTERNAL MEDICINE

## 2023-12-30 PROCEDURE — 25000003 PHARM REV CODE 250: Performed by: INTERNAL MEDICINE

## 2023-12-30 PROCEDURE — 83735 ASSAY OF MAGNESIUM: CPT | Performed by: NURSE PRACTITIONER

## 2023-12-30 PROCEDURE — 63600175 PHARM REV CODE 636 W HCPCS: Performed by: NURSE PRACTITIONER

## 2023-12-30 PROCEDURE — 80053 COMPREHEN METABOLIC PANEL: CPT | Performed by: NURSE PRACTITIONER

## 2023-12-30 PROCEDURE — 93005 ELECTROCARDIOGRAM TRACING: CPT

## 2023-12-30 PROCEDURE — 21400001 HC TELEMETRY ROOM

## 2023-12-30 PROCEDURE — 85730 THROMBOPLASTIN TIME PARTIAL: CPT | Performed by: INTERNAL MEDICINE

## 2023-12-30 PROCEDURE — 87040 BLOOD CULTURE FOR BACTERIA: CPT | Performed by: INTERNAL MEDICINE

## 2023-12-30 RX ORDER — SODIUM CHLORIDE 0.9 % (FLUSH) 0.9 %
10 SYRINGE (ML) INJECTION
Status: DISCONTINUED | OUTPATIENT
Start: 2023-12-30 | End: 2024-01-08 | Stop reason: HOSPADM

## 2023-12-30 RX ORDER — MAGNESIUM SULFATE HEPTAHYDRATE 40 MG/ML
4 INJECTION, SOLUTION INTRAVENOUS ONCE
Status: COMPLETED | OUTPATIENT
Start: 2023-12-30 | End: 2023-12-30

## 2023-12-30 RX ORDER — AMLODIPINE BESYLATE 5 MG/1
10 TABLET ORAL DAILY
Status: DISCONTINUED | OUTPATIENT
Start: 2023-12-30 | End: 2024-01-08 | Stop reason: HOSPADM

## 2023-12-30 RX ORDER — TALC
6 POWDER (GRAM) TOPICAL NIGHTLY PRN
Status: DISCONTINUED | OUTPATIENT
Start: 2023-12-30 | End: 2024-01-08 | Stop reason: HOSPADM

## 2023-12-30 RX ORDER — METOPROLOL SUCCINATE 25 MG/1
25 TABLET, EXTENDED RELEASE ORAL 2 TIMES DAILY
Status: DISCONTINUED | OUTPATIENT
Start: 2023-12-30 | End: 2024-01-08 | Stop reason: HOSPADM

## 2023-12-30 RX ORDER — ACETAMINOPHEN 325 MG/1
650 TABLET ORAL EVERY 8 HOURS PRN
Status: DISCONTINUED | OUTPATIENT
Start: 2023-12-30 | End: 2024-01-08 | Stop reason: HOSPADM

## 2023-12-30 RX ORDER — PRAVASTATIN SODIUM 10 MG/1
20 TABLET ORAL DAILY
Status: DISCONTINUED | OUTPATIENT
Start: 2023-12-30 | End: 2023-12-31

## 2023-12-30 RX ORDER — GUAIFENESIN 100 MG/5ML
200 SOLUTION ORAL EVERY 6 HOURS
Status: DISCONTINUED | OUTPATIENT
Start: 2023-12-30 | End: 2024-01-04

## 2023-12-30 RX ORDER — DULOXETIN HYDROCHLORIDE 30 MG/1
60 CAPSULE, DELAYED RELEASE ORAL 2 TIMES DAILY
Status: DISCONTINUED | OUTPATIENT
Start: 2023-12-30 | End: 2024-01-08 | Stop reason: HOSPADM

## 2023-12-30 RX ORDER — NAPROXEN SODIUM 220 MG/1
81 TABLET, FILM COATED ORAL DAILY
Status: DISCONTINUED | OUTPATIENT
Start: 2023-12-31 | End: 2024-01-08 | Stop reason: HOSPADM

## 2023-12-30 RX ORDER — ACETAMINOPHEN AND CODEINE PHOSPHATE 300; 30 MG/1; MG/1
1 TABLET ORAL DAILY PRN
Status: DISCONTINUED | OUTPATIENT
Start: 2023-12-30 | End: 2024-01-08 | Stop reason: HOSPADM

## 2023-12-30 RX ORDER — CINACALCET 30 MG/1
30 TABLET, FILM COATED ORAL DAILY
Status: DISCONTINUED | OUTPATIENT
Start: 2023-12-30 | End: 2024-01-08 | Stop reason: HOSPADM

## 2023-12-30 RX ORDER — IPRATROPIUM BROMIDE AND ALBUTEROL SULFATE 2.5; .5 MG/3ML; MG/3ML
3 SOLUTION RESPIRATORY (INHALATION) EVERY 4 HOURS PRN
Status: DISCONTINUED | OUTPATIENT
Start: 2023-12-30 | End: 2024-01-03

## 2023-12-30 RX ORDER — GABAPENTIN 300 MG/1
300 CAPSULE ORAL 2 TIMES DAILY
Status: DISCONTINUED | OUTPATIENT
Start: 2023-12-30 | End: 2024-01-08 | Stop reason: HOSPADM

## 2023-12-30 RX ORDER — TRAZODONE HYDROCHLORIDE 100 MG/1
100 TABLET ORAL NIGHTLY
Status: DISCONTINUED | OUTPATIENT
Start: 2023-12-30 | End: 2024-01-08 | Stop reason: HOSPADM

## 2023-12-30 RX ORDER — SODIUM CHLORIDE 9 MG/ML
INJECTION, SOLUTION INTRAVENOUS
Status: DISCONTINUED | OUTPATIENT
Start: 2023-12-30 | End: 2024-01-08 | Stop reason: HOSPADM

## 2023-12-30 RX ADMIN — SODIUM CHLORIDE: 9 INJECTION, SOLUTION INTRAVENOUS at 10:12

## 2023-12-30 RX ADMIN — DULOXETINE HYDROCHLORIDE 60 MG: 30 CAPSULE, DELAYED RELEASE ORAL at 08:12

## 2023-12-30 RX ADMIN — AZITHROMYCIN MONOHYDRATE 500 MG: 500 INJECTION, POWDER, LYOPHILIZED, FOR SOLUTION INTRAVENOUS at 05:12

## 2023-12-30 RX ADMIN — METOPROLOL SUCCINATE 25 MG: 25 TABLET, EXTENDED RELEASE ORAL at 10:12

## 2023-12-30 RX ADMIN — LEVOTHYROXINE SODIUM 75 MCG: 25 TABLET ORAL at 10:12

## 2023-12-30 RX ADMIN — GUAIFENESIN 200 MG: 200 SOLUTION ORAL at 05:12

## 2023-12-30 RX ADMIN — PRAVASTATIN SODIUM 20 MG: 10 TABLET ORAL at 10:12

## 2023-12-30 RX ADMIN — GABAPENTIN 300 MG: 300 CAPSULE ORAL at 10:12

## 2023-12-30 RX ADMIN — AMLODIPINE BESYLATE 10 MG: 5 TABLET ORAL at 10:12

## 2023-12-30 RX ADMIN — CINACALCET 30 MG: 30 TABLET, FILM COATED ORAL at 10:12

## 2023-12-30 RX ADMIN — GUAIFENESIN 200 MG: 200 SOLUTION ORAL at 10:12

## 2023-12-30 RX ADMIN — CEFTRIAXONE 1 G: 1 INJECTION, POWDER, FOR SOLUTION INTRAMUSCULAR; INTRAVENOUS at 03:12

## 2023-12-30 RX ADMIN — GABAPENTIN 300 MG: 300 CAPSULE ORAL at 08:12

## 2023-12-30 RX ADMIN — DULOXETINE HYDROCHLORIDE 60 MG: 30 CAPSULE, DELAYED RELEASE ORAL at 10:12

## 2023-12-30 RX ADMIN — MAGNESIUM SULFATE HEPTAHYDRATE 4 G: 40 INJECTION, SOLUTION INTRAVENOUS at 10:12

## 2023-12-30 RX ADMIN — DULOXETINE HYDROCHLORIDE 60 MG: 30 CAPSULE, DELAYED RELEASE ORAL at 12:12

## 2023-12-30 RX ADMIN — TRAZODONE HYDROCHLORIDE 100 MG: 100 TABLET ORAL at 08:12

## 2023-12-30 RX ADMIN — METOPROLOL SUCCINATE 25 MG: 25 TABLET, EXTENDED RELEASE ORAL at 12:12

## 2023-12-30 RX ADMIN — LOSARTAN POTASSIUM 12.5 MG: 25 TABLET, FILM COATED ORAL at 10:12

## 2023-12-30 RX ADMIN — METOPROLOL SUCCINATE 25 MG: 25 TABLET, EXTENDED RELEASE ORAL at 08:12

## 2023-12-30 RX ADMIN — TRAZODONE HYDROCHLORIDE 100 MG: 100 TABLET ORAL at 12:12

## 2023-12-30 NOTE — H&P
[facility] Hospital Medicine History & Physical Examination       Patient Name: Edwin Boyer  MRN: [id number]  Patient Class: IP- Inpatient   Admission Date: 12/29/2023 12:01 PM  Length of Stay: 1  Admitting Service: Hospital Medicine   Attending Physician: Evert Murillo MD   Primary Care Provider: Mariely Soto MD  History source: EMR, patient and/or patient's family    CHIEF COMPLAINT   Cough (C/o cough worsening since yesterday with pulse ox reading at 86% on room air per Home Health just PTA. Denies SOB or known fever. O2 89% on RA in triage. 95% on 3L NC.)    HISTORY OF PRESENT ILLNESS:   Patient is a very pleasant 89-year-old female with past medical history as below including chronic kidney disease, hypertension, hyperlipidemia, anxiety, fibromyalgia, hypothyroidism who presented to the ER with complaints of worsening cough over the last 48 hours and a low home O2 reading per home health with home sat being 86% on room air.    She arrived to the ER requiring 3 L nasal cannula to maintain adequate sats, but afebrile hemodynamically stable.  Laboratory work showed a mild leukocytosis 14 K, a BNP of 840, a troponin of 1.9.  EKG showed sinus rhythm with RBBB.  Chest x-ray showed right-sided infiltrates.  She was seen by Cardiology in the ER and started on a heparin drip, she was given empiric antibiotics admitted to the hospitalist service for community-acquired pneumonia and NSTEMI.    PAST MEDICAL HISTORY:     Past Medical History:   Diagnosis Date    Anxiety 6/6/2022    Carpal tunnel syndrome, bilateral     Chronic back pain     CKD (chronic kidney disease) stage 3, GFR 30-59 ml/min 6/6/2022    Fibromyalgia     GERD with esophagitis 6/6/2022    HTN (hypertension), benign 6/6/2022    Hyperlipidemia 6/6/2022    Hypothyroidism 6/6/2022    Insomnia 6/6/2022    OA (osteoarthritis) 6/6/2022    Osteoporosis     Primary hyperparathyroidism 2/8/2023    Urinary incontinence  2022       PAST SURGICAL HISTORY:     Past Surgical History:   Procedure Laterality Date    APPENDECTOMY       SECTION      COLONOSCOPY      HYSTERECTOMY      TOTAL KNEE ARTHROPLASTY         ALLERGIES:   Meperidine and Sulfamethoxazole    FAMILY HISTORY:   Reviewed and non-contributory     SOCIAL HISTORY:     Social History     Tobacco Use    Smoking status: Never    Smokeless tobacco: Never   Substance Use Topics    Alcohol use: Never        HOME MEDICATIONS:     Prior to Admission medications    Medication Sig Start Date End Date Taking? Authorizing Provider   amLODIPine (NORVASC) 10 MG tablet Take 1 tablet (10 mg total) by mouth once daily. 23  Yes Mariely Soto MD   cinacalcet (SENSIPAR) 30 MG Tab Take 1 tablet (30 mg total) by mouth once daily. 23  Yes Mariely Soto MD   DULoxetine (CYMBALTA) 60 MG capsule Take 1 capsule (60 mg total) by mouth every morning.  Patient taking differently: Take 60 mg by mouth 2 (two) times daily. 23  Yes Mariely Soto MD   gabapentin (NEURONTIN) 300 MG capsule Take 1 capsule (300 mg total) by mouth 2 (two) times daily. 10/13/23  Yes Mariely Soto MD   levothyroxine (SYNTHROID) 75 MCG tablet Take 1 tablet (75 mcg total) by mouth once daily. 23  Yes Mariely Soto MD   losartan (COZAAR) 25 MG tablet Take 0.5 tablets (12.5 mg total) by mouth once daily. 23 Yes aMriely Soto MD   metoprolol succinate (TOPROL-XL) 25 MG 24 hr tablet Take 1 tablet (25 mg total) by mouth 2 (two) times daily. 23  Yes Mariely Soto MD   pravastatin (PRAVACHOL) 20 MG tablet Take 20 mg by mouth once daily. 23  Yes Provider, Historical   traZODone (DESYREL) 100 MG tablet Take 1 tablet (100 mg total) by mouth every evening. 23 Yes Mariely Soto MD   acetaminophen-codeine 300-30mg (TYLENOL #3) 300-30 mg Tab Take 1 tablet by mouth daily as needed (pain).    Provider, Historical       REVIEW OF SYSTEMS:   Except as documented,  "all other systems reviewed and negative     PHYSICAL EXAM:   T 98.7 °F (37.1 °C)   /73   P 68   RR 20   O2 (!) 94 %  GENERAL: awake, alert, oriented and in no acute distress, non-toxic appearing   HEENT: normocephalic atraumatic   NECK: supple   LUNGS:  Bilateral wheezing/rhonchi, no wheezing or rales, no accessory muscle use   CVS: Regular rate and rhythm, normal peripheral perfusion  ABD: Soft, non-tender, non-distended, bowel sounds present  EXTREMITIES: no clubbing or cyanosis  SKIN: Warm, dry.   NEURO: alert and oriented, grossly without focal deficits   PSYCHIATRIC: Cooperative    LABS AND IMAGING:     Recent Labs     12/29/23  1216   WBC 14.70*   RBC 4.64   HGB 15.0   HCT 43.0   MCV 92.7   MCH 32.3*   MCHC 34.9   RDW 12.0        Recent Labs     12/29/23  1512   LACTIC 1.3     Recent Labs     12/29/23  1716   INR 1.2     No results for input(s): "HGBA1C", "CHOL", "TRIG", "LDL", "VLDL", "HDL" in the last 72 hours.   Recent Labs     12/29/23  1215   *   K 3.6   CHLORIDE 95*   CO2 24   BUN 18.5   CREATININE 0.86   GLUCOSE 141*   CALCIUM 8.1*   ALBUMIN 3.4   GLOBULIN 3.0   ALKPHOS 52   ALT 13   AST 28   BILITOT 0.8     Recent Labs     12/29/23  1215 12/29/23  1216 12/29/23  1716 12/29/23  2312   BNP  --  849.2*  --   --    TROPONINI 1.936*  --  2.442* 2.126*          Echo Saline Bubble? No    Left Ventricle: The left ventricle is normal in size. Normal wall   thickness. Normal wall motion. Mid septal wall is slightly hypokinetic.   There is normal systolic function with a visually estimated ejection   fraction of 55 - 60%. Grade II diastolic dysfunction.    Right Ventricle: Normal right ventricular cavity size. Systolic   function is normal.    Left Atrium: Left atrium is dilated.    Aortic Valve: The aortic valve is a trileaflet valve. There is moderate   aortic valve sclerosis. Mildly calcified cusps. There is no stenosis.   Aortic valve peak velocity is 1.55 m/s. Mean gradient is 5 mmHg. " There is   mild aortic regurgitation.    Mitral Valve: Moderately thickened leaflets. There is mild mitral   annular calcification present. There is mild stenosis. The mean pressure   gradient across the mitral valve is 5 mmHg at a heart rate of 79 bpm.   There is mild regurgitation.    Tricuspid Valve: There is no stenosis. There is mild regurgitation.    Pulmonic Valve: There is no stenosis.  X-Ray Chest 1 View  Narrative: EXAMINATION:  XR CHEST 1 VIEW    CPT 65065    CLINICAL HISTORY:  shortness of breath;    COMPARISON:  July 6, 2020    FINDINGS:  Examination reveals mediastinal cardiac silhouette to be within normal limits left lung field is clear and free of gross infiltrates atelectasis or effusions.    Slightly more confluent opacities identified in the right cardiophrenic angle region early infiltrate can not be completely excluded.    There might be minimal blunting of the right costophrenic angle which may indicate the presence of a small right-sided pleural effusion  Impression: Minimal blunting of the right costophrenic angle as above.    Slightly more confluent opacities in the right cardiophrenic angle region early infiltrate can not be completely excluded.    Otherwise no active pulmonary disease and no change as compared with previous exam    Electronically signed by: Javier Mcneil  Date:    12/29/2023  Time:    12:13      ASSESSMENT & PLAN:   Community-acquired pneumonia  Acute hypoxemic respiratory failure 2/2 above   NSTEMI, likely type 2 demand secondary to above     HX: Anxiety, fibromyalgia, HTN, hypothyroidism, hyperlipidemia     -blood cultures, respiratory PCR, sputum culture, MRSA swab  -start broad-spectrum empiric antibiotics   -heparin drip, trend cardiac enzymes, echo, cis following  -resume home medications as appropriate    DVT prophylaxis: heparin gtt   Code status: full     If patient was admitted under observational status it is with my approval/permission.     At least 55 min  was spent on this history and physical.  Time seen: 11:45PM 12/29/23  Critical care time = 35 min; Critical care diagnosis = hypoxia  Evert Murillo MD

## 2023-12-30 NOTE — PLAN OF CARE
Problem: Adult Inpatient Plan of Care  Goal: Plan of Care Review  Outcome: Ongoing, Not Progressing  Goal: Patient-Specific Goal (Individualized)  Outcome: Ongoing, Not Progressing  Goal: Absence of Hospital-Acquired Illness or Injury  Outcome: Ongoing, Not Progressing  Goal: Optimal Comfort and Wellbeing  Outcome: Ongoing, Not Progressing  Goal: Readiness for Transition of Care  Outcome: Ongoing, Not Progressing     Problem: Fall Injury Risk  Goal: Absence of Fall and Fall-Related Injury  Outcome: Ongoing, Not Progressing

## 2023-12-30 NOTE — PROGRESS NOTES
[facility]  Hospital Medicine Progress Note        CHIEF COMPLAINT   Cough (C/o cough worsening since yesterday with pulse ox reading at 86% on room air per Home Health just PTA. Denies SOB or known fever. O2 89% on RA in triage. 95% on 3L NC.)     HISTORY OF PRESENT ILLNESS:   Patient is a very pleasant 89-year-old female with past medical history as below including chronic kidney disease, hypertension, hyperlipidemia, anxiety, fibromyalgia, hypothyroidism who presented to the ER with complaints of worsening cough over the last 48 hours and a low home O2 reading per home health with home sat being 86% on room air.     She arrived to the ER requiring 3 L nasal cannula to maintain adequate sats, but afebrile hemodynamically stable.  Laboratory work showed a mild leukocytosis 14 K, a BNP of 840, a troponin of 1.9.  EKG showed sinus rhythm with RBBB.  Chest x-ray showed right-sided infiltrates.  She was seen by Cardiology in the ER and started on a heparin drip, she was given empiric antibiotics admitted to the hospitalist service for community-acquired pneumonia and NSTEMI.       Patient currently admitted for acute hypoxic respiratory failure due to adenovirus with possible bacterial coinfection pneumonia complicated by NSTEMI 1 versus 2, on heparin drip per [facility] recommendations     Today's information   Patient examined at bedside, family member at bedside   Patient has no complaints he was trying to have her lunch she has a big appetite   Vitals reviewed and stable on 4 L of oxygen   White cell count of 12.4, improving, magnesium of 1.4, low respiratory panel shows adenovirus   Echo with normal EF and grade 2 diastolic dysfunction   Chest x-ray shows right-sided infiltrate   Blood cultures are pending       Exam  GENERAL: awake, alert, oriented and in no acute distress, non-toxic appearing   HEENT: normocephalic atraumatic   NECK: supple   LUNGS:  Bilateral wheezing/rhonchi, no wheezing  or rales, no accessory muscle use   CVS: Regular rate and rhythm, normal peripheral perfusion  ABD: Soft, non-tender, non-distended, bowel sounds present  EXTREMITIES: no clubbing or cyanosis  SKIN: Warm, dry.   NEURO: alert and oriented, grossly without focal deficits   PSYCHIATRIC: Cooperative      ASSESSMENT & PLAN:   Community-acquired pneumonia  Acute hypoxemic respiratory failure 2/2 above   NSTEMI, likely type 2 demand secondary to above   Leukocytosis, improving   Adenovirus infection  Cough   Atypical chest pain   Hypertension, controlled   Low magnesium levels        HX: Anxiety, fibromyalgia, HTN, hypothyroidism, hyperlipidemia      Plan  Continue oxygen supplementation to goal SpO2 greater than 94%   Try to wean off oxygen as possible to goal SpO2 greater than 94%   Provide with incentive spirometry   Continue with IV ceftriaxone and azithromycin day 1  Antitussives p.r.n. for cough   Follow-up with [facility] recommendations currently to continue heparin drip and plans for possible left heart catheterization on 01/02  IV magnesium 4 g x 1   resume home medications as appropriate     DVT prophylaxis: heparin gtt   Code status: full       Critical Care diagnoses NSTEMI on heparin drip per protocol   Critical care time greater than 35 minutes       VITAL SIGNS: 24 HRS MIN & MAX LAST   Temp  Min: 98.4 °F (36.9 °C)  Max: 99.2 °F (37.3 °C) 98.4 °F (36.9 °C)   BP  Min: 124/73  Max: 152/75 126/75   Pulse  Min: 68  Max: 92  79   Resp  Min: 18  Max: 24 18   SpO2  Min: 90 %  Max: 96 % (!) 90 %     I have reviewed the following labs:  Recent Labs   Lab 12/29/23  1216 12/30/23  0727   WBC 14.70* 12.43*   RBC 4.64 4.62   HGB 15.0 14.7   HCT 43.0 42.6   MCV 92.7 92.2   MCH 32.3* 31.8*   MCHC 34.9 34.5   RDW 12.0 12.0    193   MPV 9.2 9.3     Recent Labs   Lab 12/29/23  1215 12/30/23  0727   * 131*   K 3.6 3.6   CO2 24 23   BUN 18.5 17.2   CREATININE 0.86 0.70   CALCIUM 8.1* 7.6*   MG  --  1.40*   ALBUMIN 3.4  2.9*   ALKPHOS 52 51   ALT 13 13   AST 28 32   BILITOT 0.8 0.6     Microbiology Results (last 7 days)       Procedure Component Value Units Date/Time    Blood Culture [[id number]] Collected: 12/30/23 0012    Order Status: Resulted Specimen: Blood, Venous Updated: 12/30/23 0025    Blood Culture [[id number]] Collected: 12/30/23 0012    Order Status: Resulted Specimen: Blood, Venous Updated: 12/30/23 0025    Respiratory Culture [[id number]]     Order Status: Sent Specimen: Sputum, Expectorated              See below for Radiology    Scheduled Med:   amLODIPine  10 mg Oral Daily    [START ON 12/31/2023] aspirin  81 mg Oral Daily    azithromycin  500 mg Intravenous Q24H    cefTRIAXone (ROCEPHIN) IVPB  1 g Intravenous Q24H    cinacalcet  30 mg Oral Daily    DULoxetine  60 mg Oral BID    gabapentin  300 mg Oral BID    guaiFENesin 100 mg/5 ml  200 mg Oral Q6H    levothyroxine  75 mcg Oral Daily    losartan  12.5 mg Oral Daily    metoprolol succinate  25 mg Oral BID    pravastatin  20 mg Oral Daily    traZODone  100 mg Oral QHS      Continuous Infusions:   heparin (porcine) in D5W 11 Units/kg/hr (12/30/23 1100)      PRN Meds:  sodium chloride 0.9%, acetaminophen, acetaminophen, acetaminophen-codeine 300-30mg, albuterol-ipratropium, heparin (PORCINE), heparin (PORCINE), melatonin, sodium chloride 0.9%     Assessment/Plan:      VTE prophylaxis:     Patient condition:  Stable/Fair/Guarded/ Serious/ Critical    Anticipated discharge and Disposition:         All diagnosis and differential diagnosis have been reviewed; assessment and plan has been documented; I have personally reviewed the labs and test results that are presently available; I have reviewed the patients medication list; I have reviewed the consulting providers response and recommendations. I have reviewed or attempted to review medical records based upon their availability    All of the patient's questions have been  addressed and answered. Patient's is agreeable  to the above stated plan. I will continue to monitor closely and make adjustments to medical management as needed.  _____________________________________________________________________    Nutrition Status:    Radiology:  I have personally reviewed the following imaging and agree with the radiologist.     Echo Saline Bubble? No    Left Ventricle: The left ventricle is normal in size. Normal wall   thickness. Normal wall motion. Mid septal wall is slightly hypokinetic.   There is normal systolic function with a visually estimated ejection   fraction of 55 - 60%. Grade II diastolic dysfunction.    Right Ventricle: Normal right ventricular cavity size. Systolic   function is normal.    Left Atrium: Left atrium is dilated.    Aortic Valve: The aortic valve is a trileaflet valve. There is moderate   aortic valve sclerosis. Mildly calcified cusps. There is no stenosis.   Aortic valve peak velocity is 1.55 m/s. Mean gradient is 5 mmHg. There is   mild aortic regurgitation.    Mitral Valve: Moderately thickened leaflets. There is mild mitral   annular calcification present. There is mild stenosis. The mean pressure   gradient across the mitral valve is 5 mmHg at a heart rate of 79 bpm.   There is mild regurgitation.    Tricuspid Valve: There is no stenosis. There is mild regurgitation.    Pulmonic Valve: There is no stenosis.  X-Ray Chest 1 View  Narrative: EXAMINATION:  XR CHEST 1 VIEW    CPT 50218    CLINICAL HISTORY:  shortness of breath;    COMPARISON:  July 6, 2020    FINDINGS:  Examination reveals mediastinal cardiac silhouette to be within normal limits left lung field is clear and free of gross infiltrates atelectasis or effusions.    Slightly more confluent opacities identified in the right cardiophrenic angle region early infiltrate can not be completely excluded.    There might be minimal blunting of the right costophrenic angle which may indicate the presence of a small right-sided pleural effusion  Impression:  Minimal blunting of the right costophrenic angle as above.    Slightly more confluent opacities in the right cardiophrenic angle region early infiltrate can not be completely excluded.    Otherwise no active pulmonary disease and no change as compared with previous exam    Electronically signed by: Javier Mcneil  Date:    12/29/2023  Time:    12:13      Josué Crooks MD   12/30/2023

## 2023-12-30 NOTE — ED NOTES
Pt heparin stopped due to critical Ptt level. Pt is at baseline, aaaox4, no acute distress, no active bleeding, will continue to monitor. Will draw a stat Ptt level per nomogram and monitor patient for bleeding, Dr Portillo notified.

## 2023-12-30 NOTE — PROGRESS NOTES
" [facility]    Cardiology  Progress Note    Patient Name: Edwin Boyer  MRN: [id number]  Admission Date: 2023  Hospital Length of Stay: 1 days  Code Status: Full Code   Attending Provider: Brendon Portillo MD   Consulting Provider: BERRY Rivera  Primary Care Physician: Mariely Soto MD  Principal Problem:NSTEMI (non-ST elevated myocardial infarction)    Patient information was obtained from patient, past medical records, and ER records.     Subjective:     Chief Complaint: Reason for Consult: Elevated Troponin      HPI: Ms. Boyer is an 89 y/o female who is known to [facility], Dr. Guidry. The patient presented to [facility] ER on 23 with c/o a Cough. She reported that about 2 days prior she developed a cough which progressively worsened and her Pulse Oxymetry at Home was 86% on RA. She denied CP, Fever, Chills and SOB. Reported Mild SOB at Rest. Secondary to these findings and symptoms she presented to the ER for evaluation. Upon Evaluation in the ER she was found to have an RA O2 saturation in the Low 80s and began to endorse CP with a Cough. The CP was not presented unless she was coughing. The Pain was 8/10 on a verbal scale with coughing and described as a "sharp pain". She reported that the cough was productive and yielded a yellow/green sputum. In the ER she was found to have a WBC 14.70, Na 130, K 3.6, Cl 95, .2, Troponin 1.936, and Negative Flu A/B and COVID Screenings. She was started on IV Abx and ASA 325mg x 1. [facility] has been consulted for NSTEMI in the Setting of Pneumonia.     23: NAD. "I am ok." Denies CP and SOB.     PMH: Anxiety, Carpel Tunnel Syndrome Bilaterally, CKD Stage III, Fibromyalgia, GERD, HTN, HLD, Hypothyroidism, OA, Osteoporosis, Hyperparathyroidism, Urinary Incontinence, VHD (Mod MR)  PSH: Appendectomy, , Colonoscopy, EMERSON, TKA, Breast Biopsy,   Family History: Father, D, H; Mother, D, HF  Social History: Denies Illicit Drug, ETOH and " Tobacco Use    Previous Cardiac Diagnostics:   ECHO 12.29.23:  Left Ventricle: The left ventricle is normal in size. Normal wall thickness. Normal wall motion. Mid septal wall is slightly hypokinetic. There is normal systolic function with a visually estimated ejection fraction of 55 - 60%. Grade II diastolic dysfunction.  Right Ventricle: Normal right ventricular cavity size. Systolic function is normal.  Left Atrium: Left atrium is dilated.  Aortic Valve: The aortic valve is a trileaflet valve. There is moderate aortic valve sclerosis. Mildly calcified cusps. There is no stenosis. Aortic valve peak velocity is 1.55 m/s. Mean gradient is 5 mmHg. There is mild aortic regurgitation.  Mitral Valve: Moderately thickened leaflets. There is mild mitral annular calcification present. There is mild stenosis. The mean pressure gradient across the mitral valve is 5 mmHg at a heart rate of 79 bpm. There is mild regurgitation.  Tricuspid Valve: There is no stenosis. There is mild regurgitation.  Pulmonic Valve: There is no stenosis.    ECHO 5.3.23:  The study quality is average.   The left ventricle is normal in size. Global left ventricular systolic function is normal. The left ventricular ejection fraction is 60%. Left ventricular diastolic function is normal. Mild asymmetric septal left ventricular hypertrophy is present.  The left atrial diameter is mildly increased.  Moderate (2+) mitral regurgitation. PISA is 0.54 cm. The MR volume color doppler method is 20 ml. EROA measures 0.1 cm². Vena contracta is 4.5 mm.   Mild (1+) tricuspid regurgitation. Mild (1+) aortic regurgitation. Mild (1+) pulmonic regurgitation.   The estimated pulmonary artery systolic pressure is 39 mmHg assuming a right atrial pressure of 3 mmHg.     Calcium Score 5.18.18:  Total Calcium Score 299    MPI 10.26.11:  This is a normal perfusion study, no perfusion defects noted.   This scan is suggestive of low risk for future cardiovascular events.  The  left ventricular cavity is noted to be normal on the stress study. The left ventricular ejection fraction was calculated to be 74% and left ventricular global function is normal.   The study quality is good.    Review of patient's allergies indicates:   Allergen Reactions    Meperidine      Other reaction(s): EXCITATIVE TYPE PSYCHOSIS    Sulfamethoxazole      No current facility-administered medications on file prior to encounter.     Current Outpatient Medications on File Prior to Encounter   Medication Sig    amLODIPine (NORVASC) 10 MG tablet Take 1 tablet (10 mg total) by mouth once daily.    cinacalcet (SENSIPAR) 30 MG Tab Take 1 tablet (30 mg total) by mouth once daily.    DULoxetine (CYMBALTA) 60 MG capsule Take 1 capsule (60 mg total) by mouth every morning. (Patient taking differently: Take 60 mg by mouth 2 (two) times daily.)    gabapentin (NEURONTIN) 300 MG capsule Take 1 capsule (300 mg total) by mouth 2 (two) times daily.    levothyroxine (SYNTHROID) 75 MCG tablet Take 1 tablet (75 mcg total) by mouth once daily.    losartan (COZAAR) 25 MG tablet Take 0.5 tablets (12.5 mg total) by mouth once daily.    metoprolol succinate (TOPROL-XL) 25 MG 24 hr tablet Take 1 tablet (25 mg total) by mouth 2 (two) times daily.    pravastatin (PRAVACHOL) 20 MG tablet Take 20 mg by mouth once daily.    traZODone (DESYREL) 100 MG tablet Take 1 tablet (100 mg total) by mouth every evening.    acetaminophen-codeine 300-30mg (TYLENOL #3) 300-30 mg Tab Take 1 tablet by mouth daily as needed (pain).     Review of Systems   Constitutional:  Positive for fatigue. Negative for chills and fever.   Respiratory:  Negative for shortness of breath.    Cardiovascular:  Negative for chest pain, palpitations and leg swelling.   All other systems reviewed and are negative.    Objective:     Vital Signs (Most Recent):  Temp: 98.4 °F (36.9 °C) (12/30/23 1109)  Pulse: 79 (12/30/23 1109)  Resp: 18 (12/30/23 1109)  BP: 126/75 (12/30/23  1109)  SpO2: (!) 90 % (12/30/23 1109) Vital Signs (24h Range):  Temp:  [98.4 °F (36.9 °C)-99.2 °F (37.3 °C)] 98.4 °F (36.9 °C)  Pulse:  [68-92] 79  Resp:  [18-24] 18  SpO2:  [90 %-96 %] 90 %  BP: (124-152)/(68-82) 126/75     Weight: 64.4 kg (142 lb)  Body mass index is 26.83 kg/m².    SpO2: (!) 90 %         Intake/Output Summary (Last 24 hours) at 12/30/2023 1526  Last data filed at 12/30/2023 1407  Gross per 24 hour   Intake 766.15 ml   Output 2 ml   Net 764.15 ml       Lines/Drains/Airways       Drain  Duration             Female External Urinary Catheter w/ Suction 12/29/23 1 day              Peripheral Intravenous Line  Duration                  Peripheral IV - Single Lumen 12/29/23 1312 20 G Anterior;Right Hand 1 day         Peripheral IV - Single Lumen 12/29/23 1544 20 G Left Antecubital <1 day                  Significant Labs:  Recent Results (from the past 72 hour(s))   COVID/FLU A&B PCR    Collection Time: 12/29/23 11:55 AM   Result Value Ref Range    Influenza A PCR Not Detected Not Detected    Influenza B PCR Not Detected Not Detected    SARS-CoV-2 PCR Not Detected Not Detected, Negative   Comprehensive Metabolic Panel    Collection Time: 12/29/23 12:15 PM   Result Value Ref Range    Sodium Level 130 (L) 136 - 145 mmol/L    Potassium Level 3.6 3.5 - 5.1 mmol/L    Chloride 95 (L) 98 - 107 mmol/L    Carbon Dioxide 24 23 - 31 mmol/L    Glucose Level 141 (H) 82 - 115 mg/dL    Blood Urea Nitrogen 18.5 9.8 - 20.1 mg/dL    Creatinine 0.86 0.55 - 1.02 mg/dL    Calcium Level Total 8.1 (L) 8.4 - 10.2 mg/dL    Protein Total 6.4 5.8 - 7.6 gm/dL    Albumin Level 3.4 3.4 - 4.8 g/dL    Globulin 3.0 2.4 - 3.5 gm/dL    Albumin/Globulin Ratio 1.1 1.1 - 2.0 ratio    Bilirubin Total 0.8 <=1.5 mg/dL    Alkaline Phosphatase 52 40 - 150 unit/L    Alanine Aminotransferase 13 0 - 55 unit/L    Aspartate Aminotransferase 28 5 - 34 unit/L    eGFR >60 mls/min/1.73/m2   Troponin I    Collection Time: 12/29/23 12:15 PM   Result Value  Ref Range    Troponin-I 1.936 (H) 0.000 - 0.045 ng/mL   BNP    Collection Time: 12/29/23 12:16 PM   Result Value Ref Range    Natriuretic Peptide 849.2 (H) <=100.0 pg/mL   CBC with Differential    Collection Time: 12/29/23 12:16 PM   Result Value Ref Range    WBC 14.70 (H) 4.50 - 11.50 x10(3)/mcL    RBC 4.64 4.20 - 5.40 x10(6)/mcL    Hgb 15.0 12.0 - 16.0 g/dL    Hct 43.0 37.0 - 47.0 %    MCV 92.7 80.0 - 94.0 fL    MCH 32.3 (H) 27.0 - 31.0 pg    MCHC 34.9 33.0 - 36.0 g/dL    RDW 12.0 11.5 - 17.0 %    Platelet 187 130 - 400 x10(3)/mcL    MPV 9.2 7.4 - 10.4 fL    Neut % 86.9 %    Lymph % 5.6 %    Mono % 6.8 %    Eos % 0.0 %    Basophil % 0.2 %    Lymph # 0.83 0.6 - 4.6 x10(3)/mcL    Neut # 12.76 (H) 2.1 - 9.2 x10(3)/mcL    Mono # 1.00 0.1 - 1.3 x10(3)/mcL    Eos # 0.00 0 - 0.9 x10(3)/mcL    Baso # 0.03 <=0.2 x10(3)/mcL    IG# 0.08 (H) 0 - 0.04 x10(3)/mcL    IG% 0.5 %    NRBC% 0.0 %   Urinalysis, Reflex to Urine Culture    Collection Time: 12/29/23  1:33 PM    Specimen: Urine   Result Value Ref Range    Color, UA Light-Yellow Yellow, Light-Yellow, Colorless, Straw, Dark-Yellow    Appearance, UA Clear Clear    Specific Gravity, UA 1.010 1.005 - 1.030    pH, UA 5.5 5.0 - 8.5    Protein, UA Trace (A) Negative    Glucose, UA Normal Negative, Normal    Ketones, UA Negative Negative    Blood, UA Negative Negative    Bilirubin, UA Negative Negative    Urobilinogen, UA Normal 0.2, 1.0, Normal    Nitrites, UA Negative Negative    Leukocyte Esterase, UA Negative Negative    WBC, UA 0-5 None Seen, 0-2, 3-5, 0-5 /HPF    Bacteria, UA Moderate (A) None Seen, Trace /HPF    Squamous Epithelial Cells, UA Trace None Seen /HPF    RBC, UA 0-5 None Seen, 0-2, 3-5, 0-5 /HPF   Lactic acid, plasma    Collection Time: 12/29/23  3:12 PM   Result Value Ref Range    Lactic Acid Level 1.3 0.5 - 2.2 mmol/L   APTT    Collection Time: 12/29/23  5:16 PM   Result Value Ref Range    .8 (HH) 23.2 - 33.7 seconds   Protime-INR    Collection Time:  12/29/23  5:16 PM   Result Value Ref Range    PT 14.8 (H) 12.5 - 14.5 seconds    INR 1.2 <=1.3   Troponin I    Collection Time: 12/29/23  5:16 PM   Result Value Ref Range    Troponin-I 2.442 (H) 0.000 - 0.045 ng/mL   Echo Saline Bubble? No    Collection Time: 12/29/23  5:28 PM   Result Value Ref Range    BSA 1.66 m2    Avitia's Biplane MOD Ejection Fraction 58 %    LVOT stroke volume 49.68 cm3    LV Systolic Volume 25.60 mL    LV Systolic Volume Index 15.7 mL/m2    LV Diastolic Volume 61.20 mL    LV Diastolic Volume Index 37.55 mL/m2    LVOT diameter 1.70 cm    LVOT area 2.3 cm2    MV Peak E Toby 1.20 m/s    TDI LATERAL 0.06 m/s    TDI SEPTAL 0.07 m/s    E/E' ratio 18.46 m/s    MV Peak A Toby 1.12 m/s    TR Max Toby 2.63 m/s    E/A ratio 1.07     E wave deceleration time 177.00 msec    LV SEPTAL E/E' RATIO 17.14 m/s    LV LATERAL E/E' RATIO 20.00 m/s    LVOT peak toby 1.10 m/s    Left Ventricular Outflow Tract Mean Velocity 0.77 cm/s    Left Ventricular Outflow Tract Mean Gradient 3.00 mmHg    TAPSE 1.98 cm    LA volume (mod) 44.80 cm3    LA Volume Index (Mod) 27.5 mL/m2    RA Major Axis 3.90 cm    RA Width 2.90 cm    AV regurgitation pressure 1/2 time 343 ms    AR Max Toby 3.61 m/s    AV mean gradient 5 mmHg    AV peak gradient 10 mmHg    Ao peak toby 1.55 m/s    Ao VTI 31.90 cm    LVOT peak VTI 21.90 cm    AV valve area 1.56 cm²    AV Velocity Ratio 0.71     AV index (prosthetic) 0.69     TITO by Velocity Ratio 1.61 cm²    MV mean gradient 5 mmHg    MV peak gradient 8 mmHg    MV valve area by continuity eq 1.73 cm2    MV VTI 28.7 cm    Triscuspid Valve Regurgitation Peak Gradient 28 mmHg    Mean e' 0.07 m/s    Mitral Valve Heart Rate 79 bpm   APTT    Collection Time: 12/29/23  6:45 PM   Result Value Ref Range    PTT >200.0 (HH) 23.2 - 33.7 seconds   Troponin I    Collection Time: 12/29/23 11:12 PM   Result Value Ref Range    Troponin-I 2.126 (H) 0.000 - 0.045 ng/mL   APTT    Collection Time: 12/30/23 12:12 AM   Result  Value Ref Range    PTT 36.2 (H) 23.2 - 33.7 seconds   APTT    Collection Time: 12/30/23 12:45 AM   Result Value Ref Range    PTT 37.3 (H) 23.2 - 33.7 seconds   Respiratory Panel    Collection Time: 12/30/23 12:45 AM   Result Value Ref Range    Adenovirus Detected (A) Not Detected    Coronavirus 229E Not Detected Not Detected    Coronavirus HKU1 Not Detected Not Detected    Coronavirus NL63 Not Detected Not Detected    Coronavirus OC43 PCR, Common Cold Virus Not Detected Not Detected    Human Metapneumovirus Not Detected Not Detected    Parainfluenza Virus 1 Not Detected Not Detected    Parainfluenza Virus 2 Not Detected Not Detected    Parainfluenza Virus 3 Not Detected Not Detected    Parainfluenza Virus 4 Not Detected Not Detected    Bordetella pertussis (ptxP) Not Detected Not Detected    Chlamydia pneumoniae Not Detected Not Detected    Mycoplasma pneumoniae Not Detected Not Detected    Human Rhinovirus/Enterovirus Not Detected Not Detected    Bordetella parapertussis (SI9213) Not Detected Not Detected   MRSA PCR    Collection Time: 12/30/23 12:45 AM   Result Value Ref Range    MRSA PCR SCRN ([facility]) Not Detected Not Detected   APTT    Collection Time: 12/30/23  2:09 AM   Result Value Ref Range    PTT >200.0 (HH) 23.2 - 33.7 seconds   APTT    Collection Time: 12/30/23  3:52 AM   Result Value Ref Range    PTT 48.2 (H) 23.2 - 33.7 seconds   Comprehensive Metabolic Panel    Collection Time: 12/30/23  7:27 AM   Result Value Ref Range    Sodium Level 131 (L) 136 - 145 mmol/L    Potassium Level 3.6 3.5 - 5.1 mmol/L    Chloride 97 (L) 98 - 107 mmol/L    Carbon Dioxide 23 23 - 31 mmol/L    Glucose Level 93 82 - 115 mg/dL    Blood Urea Nitrogen 17.2 9.8 - 20.1 mg/dL    Creatinine 0.70 0.55 - 1.02 mg/dL    Calcium Level Total 7.6 (L) 8.4 - 10.2 mg/dL    Protein Total 5.7 (L) 5.8 - 7.6 gm/dL    Albumin Level 2.9 (L) 3.4 - 4.8 g/dL    Globulin 2.8 2.4 - 3.5 gm/dL    Albumin/Globulin Ratio 1.0 (L) 1.1 - 2.0 ratio    Bilirubin  Total 0.6 <=1.5 mg/dL    Alkaline Phosphatase 51 40 - 150 unit/L    Alanine Aminotransferase 13 0 - 55 unit/L    Aspartate Aminotransferase 32 5 - 34 unit/L    eGFR >60 mls/min/1.73/m2   Magnesium    Collection Time: 12/30/23  7:27 AM   Result Value Ref Range    Magnesium Level 1.40 (L) 1.60 - 2.60 mg/dL   Troponin I    Collection Time: 12/30/23  7:27 AM   Result Value Ref Range    Troponin-I     CBC with Differential    Collection Time: 12/30/23  7:27 AM   Result Value Ref Range    WBC 12.43 (H) 4.50 - 11.50 x10(3)/mcL    RBC 4.62 4.20 - 5.40 x10(6)/mcL    Hgb 14.7 12.0 - 16.0 g/dL    Hct 42.6 37.0 - 47.0 %    MCV 92.2 80.0 - 94.0 fL    MCH 31.8 (H) 27.0 - 31.0 pg    MCHC 34.5 33.0 - 36.0 g/dL    RDW 12.0 11.5 - 17.0 %    Platelet 193 130 - 400 x10(3)/mcL    MPV 9.3 7.4 - 10.4 fL    Neut % 84.1 %    Lymph % 9.2 %    Mono % 5.9 %    Eos % 0.0 %    Basophil % 0.2 %    Lymph # 1.14 0.6 - 4.6 x10(3)/mcL    Neut # 10.47 (H) 2.1 - 9.2 x10(3)/mcL    Mono # 0.73 0.1 - 1.3 x10(3)/mcL    Eos # 0.00 0 - 0.9 x10(3)/mcL    Baso # 0.02 <=0.2 x10(3)/mcL    IG# 0.07 (H) 0 - 0.04 x10(3)/mcL    IG% 0.6 %    NRBC% 0.0 %   Troponin I    Collection Time: 12/30/23 11:23 AM   Result Value Ref Range    Troponin-I 1.553 (H) 0.000 - 0.045 ng/mL   APTT    Collection Time: 12/30/23 11:23 AM   Result Value Ref Range    PTT 35.3 (H) 23.2 - 33.7 seconds     Significant Imaging:  Imaging Results              X-Ray Chest 1 View (Final result)  Result time 12/29/23 12:13:33      Final result by Javier Mcneil MD (12/29/23 12:13:33)                   Impression:      Minimal blunting of the right costophrenic angle as above.    Slightly more confluent opacities in the right cardiophrenic angle region early infiltrate can not be completely excluded.    Otherwise no active pulmonary disease and no change as compared with previous exam      Electronically signed by: Javier Mcneil  Date:    12/29/2023  Time:    12:13               Narrative:     EXAMINATION:  XR CHEST 1 VIEW    CPT 13504    CLINICAL HISTORY:  shortness of breath;    COMPARISON:  July 6, 2020    FINDINGS:  Examination reveals mediastinal cardiac silhouette to be within normal limits left lung field is clear and free of gross infiltrates atelectasis or effusions.    Slightly more confluent opacities identified in the right cardiophrenic angle region early infiltrate can not be completely excluded.    There might be minimal blunting of the right costophrenic angle which may indicate the presence of a small right-sided pleural effusion                                    EKG: Reviewed    Telemetry: SR     Physical Exam  Constitutional:       General: She is not in acute distress.     Appearance: Normal appearance.   HENT:      Head: Normocephalic.      Mouth/Throat:      Mouth: Mucous membranes are moist.   Eyes:      Extraocular Movements: Extraocular movements intact.      Conjunctiva/sclera: Conjunctivae normal.   Cardiovascular:      Rate and Rhythm: Normal rate and regular rhythm.      Pulses: Normal pulses.      Heart sounds: Murmur: 2/6 TU.   Pulmonary:      Effort: Pulmonary effort is normal. No respiratory distress.      Breath sounds: Rhonchi present.      Comments: NC O2  Abdominal:      Palpations: Abdomen is soft.   Musculoskeletal:         General: No swelling. Normal range of motion.      Right lower leg: No edema.      Left lower leg: No edema.   Skin:     General: Skin is warm and dry.   Neurological:      General: No focal deficit present.      Mental Status: She is alert and oriented to person, place, and time.   Psychiatric:         Mood and Affect: Mood normal.         Behavior: Behavior normal.       Home Medications:   No current facility-administered medications on file prior to encounter.     Current Outpatient Medications on File Prior to Encounter   Medication Sig Dispense Refill    amLODIPine (NORVASC) 10 MG tablet Take 1 tablet (10 mg total) by mouth once daily. 90  tablet 3    cinacalcet (SENSIPAR) 30 MG Tab Take 1 tablet (30 mg total) by mouth once daily. 30 tablet 3    DULoxetine (CYMBALTA) 60 MG capsule Take 1 capsule (60 mg total) by mouth every morning. (Patient taking differently: Take 60 mg by mouth 2 (two) times daily.) 30 capsule 11    gabapentin (NEURONTIN) 300 MG capsule Take 1 capsule (300 mg total) by mouth 2 (two) times daily. 60 capsule 11    levothyroxine (SYNTHROID) 75 MCG tablet Take 1 tablet (75 mcg total) by mouth once daily. 90 tablet 3    losartan (COZAAR) 25 MG tablet Take 0.5 tablets (12.5 mg total) by mouth once daily. 45 tablet 3    metoprolol succinate (TOPROL-XL) 25 MG 24 hr tablet Take 1 tablet (25 mg total) by mouth 2 (two) times daily. 180 tablet 1    pravastatin (PRAVACHOL) 20 MG tablet Take 20 mg by mouth once daily.      traZODone (DESYREL) 100 MG tablet Take 1 tablet (100 mg total) by mouth every evening. 30 tablet 11    acetaminophen-codeine 300-30mg (TYLENOL #3) 300-30 mg Tab Take 1 tablet by mouth daily as needed (pain).       Current Inpatient Medications:    Current Facility-Administered Medications:     0.9%  NaCl infusion, , Intravenous, PRN, Brendon Portillo MD, Last Rate: 35 mL/hr at 12/30/23 1051, New Bag at 12/30/23 1051    acetaminophen tablet 650 mg, 650 mg, Oral, Q8H PRN, Evert Murillo MD    acetaminophen tablet 650 mg, 650 mg, Oral, Q8H PRN, Evert Murillo MD    acetaminophen-codeine 300-30mg per tablet 1 tablet, 1 tablet, Oral, Daily PRN, Evert Murillo MD    albuterol-ipratropium 2.5 mg-0.5 mg/3 mL nebulizer solution 3 mL, 3 mL, Nebulization, Q4H PRN, Evert Murillo MD    amLODIPine tablet 10 mg, 10 mg, Oral, Daily, Evert Murillo MD, 10 mg at 12/30/23 1044    azithromycin 500 mg in dextrose 5 % 250 mL IVPB (ready to mix), 500 mg, Intravenous, Q24H, Evert Murillo MD    cefTRIAXone (ROCEPHIN) 1 g in dextrose 5 % in water (D5W) 100 mL IVPB (MB+), 1 g, Intravenous, Q24H, Chidi  Evert JOSEPH MD    cinacalcet tablet 30 mg, 30 mg, Oral, Daily, Evert Murillo MD, 30 mg at 12/30/23 1044    DULoxetine DR capsule 60 mg, 60 mg, Oral, BID, Evert Murillo MD, 60 mg at 12/30/23 1044    gabapentin capsule 300 mg, 300 mg, Oral, BID, Evert Murillo MD, 300 mg at 12/30/23 1044    guaiFENesin 100 mg/5 ml syrup 200 mg, 200 mg, Oral, Q6H, Josué Crooks MD, 200 mg at 12/30/23 1042    heparin 25,000 units in dextrose 5% (100 units/ml) IV bolus from bag - ADDITIONAL PRN BOLUS - 30 units/kg (max bolus 4000 units), 30 Units/kg (Adjusted), Intravenous, PRN, Bakari Murillo, ANP    heparin 25,000 units in dextrose 5% (100 units/ml) IV bolus from bag - ADDITIONAL PRN BOLUS - 60 units/kg (max bolus 4000 units), 60 Units/kg (Adjusted), Intravenous, PRN, Bakari Murillo, ANP, 3,260 Units at 12/30/23 1000    heparin 25,000 units in dextrose 5% 250 mL (100 units/mL) infusion LOW INTENSITY nomogram - [facility], 0-40 Units/kg/hr (Adjusted), Intravenous, Continuous, Bakari Murillo, ANP, Last Rate: 6 mL/hr at 12/30/23 1100, 11 Units/kg/hr at 12/30/23 1100    levothyroxine tablet 75 mcg, 75 mcg, Oral, Daily, Evert Murillo MD, 75 mcg at 12/30/23 1044    losartan split tablet 12.5 mg, 12.5 mg, Oral, Daily, Evert Murillo MD, 12.5 mg at 12/30/23 1044    melatonin tablet 6 mg, 6 mg, Oral, Nightly PRN, Evert Murillo MD    metoprolol succinate (TOPROL-XL) 24 hr tablet 25 mg, 25 mg, Oral, BID, Evert Murillo MD, 25 mg at 12/30/23 1044    pravastatin tablet 20 mg, 20 mg, Oral, Daily, Evert Murillo MD, 20 mg at 12/30/23 1044    sodium chloride 0.9% flush 10 mL, 10 mL, Intravenous, PRN, Evert Murillo MD    traZODone tablet 100 mg, 100 mg, Oral, QHS, Evert Murillo MD, 100 mg at 12/30/23 0037    VTE Risk Mitigation (From admission, onward)           Ordered     IP VTE HIGH RISK PATIENT  Once         12/30/23 0022     Place sequential compression device   Until discontinued         12/30/23 0022     heparin 25,000 units in dextrose 5% (100 units/ml) IV bolus from bag - ADDITIONAL PRN BOLUS - 60 units/kg (max bolus 4000 units)  As needed (PRN)        Question:  Heparin Infusion Adjustment (DO NOT MODIFY ANSWER)  Answer:  [email or web address]\Images\Pharmacy\HeparinInfusions\heparin LOW INTENSITY nomogram for [facility] [id number].pdf    12/29/23 1520     heparin 25,000 units in dextrose 5% (100 units/ml) IV bolus from bag - ADDITIONAL PRN BOLUS - 30 units/kg (max bolus 4000 units)  As needed (PRN)        Question:  Heparin Infusion Adjustment (DO NOT MODIFY ANSWER)  Answer:  \\[email or web address]\[email or web address]\Images\Pharmacy\HeparinInfusions\heparin LOW INTENSITY nomogram for [facility] [id number].pdf    12/29/23 1520     heparin 25,000 units in dextrose 5% 250 mL (100 units/mL) infusion LOW INTENSITY nomogram - [facility]  Continuous        Question:  Begin at (units/kg/hr)  Answer:  12    12/29/23 1520                  Assessment:   NSTEMI - Type Unspecified    - Troponin Peak with Initial Troponin 2.126    - ECHO (12.29.23) - LVEF 55-60%, Normal Wall Motion     - Coronary Calcium Score 5.18.18 - Total 229    - MPI (2011) - Normal Perfusion Study, No Perfusion Defects Noted  CAP/Leukocytosis  Cough 2/2 Above  CP 2/2 Above - Atypical   HTN  HLD  Anxiety  GERD  Fibromyalgia  OA  Hypothyroidism  No Hx of GIB    Plan:   Treatment of Pneumonitis per Primary Team  ECHO Reviewed   Start ASA 81mg PO Qday  Continue Heparin Drip per Protocol for Diagnosis of NSTEMI  Plan for Ischemic Workup prior to D/C and Tx of CAP Started (Possibly on Tuesday 1.2.24)  Will Continue to Follow   Labs in AM: CBC, CMP, Lipid Panel and Mg    BERRY Rivera  Cardiology  [facility]  12/30/2023    I have seen the patient, reviewed the Nurse Practitioner's note, assessment and plan. I have personally interviewed and examined the patient at bedside and agree with the findings. Medical decision making listed above were done  under my guidance.    Physical exam:  Cardiovascular system: regular rhythm, no murmur.  Lungs: CTAB.  Extremities: No leg edema.    Plan:  Plan for cath tuesday

## 2023-12-30 NOTE — NURSING
Nurses Note -- 4 Eyes      12/30/2023   6:23 AM      Skin assessed during: Admit      [x] No Altered Skin Integrity Present    [x]Prevention Measures Documented      [] Yes- Altered Skin Integrity Present or Discovered   [] LDA Added if Not in Epic (Describe Wound)   [] New Altered Skin Integrity was Present on Admit and Documented in LDA   [] Wound Image Taken    Wound Care Consulted? No    Attending Nurse:  Therese Gao RN/Staff Member:       CALIN Romano LPN

## 2023-12-31 LAB
ABS NEUT (OLG): 9.75 X10(3)/MCL (ref 2.1–9.2)
ALBUMIN SERPL-MCNC: 2.6 G/DL (ref 3.4–4.8)
ALBUMIN/GLOB SERPL: 1 RATIO (ref 1.1–2)
ALP SERPL-CCNC: 52 UNIT/L (ref 40–150)
ALT SERPL-CCNC: 24 UNIT/L (ref 0–55)
APTT PPP: 50.2 SECONDS (ref 23.2–33.7)
AST SERPL-CCNC: 43 UNIT/L (ref 5–34)
BILIRUB SERPL-MCNC: 0.5 MG/DL
BUN SERPL-MCNC: 17.4 MG/DL (ref 9.8–20.1)
CALCIUM SERPL-MCNC: 7.3 MG/DL (ref 8.4–10.2)
CHLORIDE SERPL-SCNC: 94 MMOL/L (ref 98–107)
CHOLEST SERPL-MCNC: 124 MG/DL
CHOLEST/HDLC SERPL: 6 {RATIO} (ref 0–5)
CO2 SERPL-SCNC: 26 MMOL/L (ref 23–31)
CREAT SERPL-MCNC: 0.74 MG/DL (ref 0.55–1.02)
D DIMER PPP IA.FEU-MCNC: 3.09 UG/ML FEU (ref 0–0.5)
ERYTHROCYTE [DISTWIDTH] IN BLOOD BY AUTOMATED COUNT: 11.8 % (ref 11.5–17)
GFR SERPLBLD CREATININE-BSD FMLA CKD-EPI: >60 MLS/MIN/1.73/M2
GLOBULIN SER-MCNC: 2.7 GM/DL (ref 2.4–3.5)
GLUCOSE SERPL-MCNC: 99 MG/DL (ref 82–115)
HCT VFR BLD AUTO: 41.6 % (ref 37–47)
HDLC SERPL-MCNC: 22 MG/DL (ref 35–60)
HEMATOLOGIST REVIEW: NORMAL
HGB BLD-MCNC: 14.2 G/DL (ref 12–16)
INSTRUMENT WBC (OLG): 11.21 X10(3)/MCL
LDLC SERPL CALC-MCNC: 71 MG/DL (ref 50–140)
LYMPHOCYTES NFR BLD MANUAL: 0.67 X10(3)/MCL
LYMPHOCYTES NFR BLD MANUAL: 6 %
MAGNESIUM SERPL-MCNC: 2 MG/DL (ref 1.6–2.6)
MCH RBC QN AUTO: 31.4 PG (ref 27–31)
MCHC RBC AUTO-ENTMCNC: 34.1 G/DL (ref 33–36)
MCV RBC AUTO: 92 FL (ref 80–94)
MONOCYTES NFR BLD MANUAL: 0.34 X10(3)/MCL (ref 0.1–1.3)
MONOCYTES NFR BLD MANUAL: 3 %
NEUTROPHILS NFR BLD MANUAL: 87 %
NRBC BLD AUTO-RTO: 0 %
PLASMA CELLS BLD QL SMEAR: 5 %
PLATELET # BLD AUTO: 212 X10(3)/MCL (ref 130–400)
PLATELET # BLD EST: NORMAL 10*3/UL
PMV BLD AUTO: 9.8 FL (ref 7.4–10.4)
POTASSIUM SERPL-SCNC: 3.5 MMOL/L (ref 3.5–5.1)
PROT SERPL-MCNC: 5.3 GM/DL (ref 5.8–7.6)
RBC # BLD AUTO: 4.52 X10(6)/MCL (ref 4.2–5.4)
RBC MORPH BLD: NORMAL
SODIUM SERPL-SCNC: 130 MMOL/L (ref 136–145)
TRIGL SERPL-MCNC: 155 MG/DL (ref 37–140)
TROPONIN I SERPL-MCNC: 0.98 NG/ML (ref 0–0.04)
VLDLC SERPL CALC-MCNC: 31 MG/DL
WBC # SPEC AUTO: 11.21 X10(3)/MCL (ref 4.5–11.5)

## 2023-12-31 PROCEDURE — 63600175 PHARM REV CODE 636 W HCPCS: Performed by: INTERNAL MEDICINE

## 2023-12-31 PROCEDURE — 27000221 HC OXYGEN, UP TO 24 HOURS

## 2023-12-31 PROCEDURE — 63600175 PHARM REV CODE 636 W HCPCS: Performed by: NURSE PRACTITIONER

## 2023-12-31 PROCEDURE — 21400001 HC TELEMETRY ROOM

## 2023-12-31 PROCEDURE — 84484 ASSAY OF TROPONIN QUANT: CPT | Performed by: INTERNAL MEDICINE

## 2023-12-31 PROCEDURE — 85730 THROMBOPLASTIN TIME PARTIAL: CPT | Performed by: INTERNAL MEDICINE

## 2023-12-31 PROCEDURE — 80053 COMPREHEN METABOLIC PANEL: CPT | Performed by: NURSE PRACTITIONER

## 2023-12-31 PROCEDURE — 80061 LIPID PANEL: CPT | Performed by: NURSE PRACTITIONER

## 2023-12-31 PROCEDURE — 85027 COMPLETE CBC AUTOMATED: CPT | Performed by: NURSE PRACTITIONER

## 2023-12-31 PROCEDURE — 93005 ELECTROCARDIOGRAM TRACING: CPT | Performed by: INTERNAL MEDICINE

## 2023-12-31 PROCEDURE — 25000003 PHARM REV CODE 250: Performed by: INTERNAL MEDICINE

## 2023-12-31 PROCEDURE — 25000003 PHARM REV CODE 250: Performed by: NURSE PRACTITIONER

## 2023-12-31 PROCEDURE — 25500020 PHARM REV CODE 255: Performed by: INTERNAL MEDICINE

## 2023-12-31 PROCEDURE — 92610 EVALUATE SWALLOWING FUNCTION: CPT

## 2023-12-31 PROCEDURE — 83735 ASSAY OF MAGNESIUM: CPT | Performed by: INTERNAL MEDICINE

## 2023-12-31 PROCEDURE — 93005 ELECTROCARDIOGRAM TRACING: CPT

## 2023-12-31 PROCEDURE — 85379 FIBRIN DEGRADATION QUANT: CPT | Performed by: INTERNAL MEDICINE

## 2023-12-31 RX ORDER — FUROSEMIDE 10 MG/ML
20 INJECTION INTRAMUSCULAR; INTRAVENOUS DAILY
Status: COMPLETED | OUTPATIENT
Start: 2023-12-31 | End: 2024-01-02

## 2023-12-31 RX ORDER — ATORVASTATIN CALCIUM 40 MG/1
40 TABLET, FILM COATED ORAL DAILY
Status: DISCONTINUED | OUTPATIENT
Start: 2023-12-31 | End: 2024-01-08 | Stop reason: HOSPADM

## 2023-12-31 RX ORDER — MORPHINE SULFATE 4 MG/ML
2 INJECTION, SOLUTION INTRAMUSCULAR; INTRAVENOUS
Status: DISCONTINUED | OUTPATIENT
Start: 2023-12-31 | End: 2024-01-08 | Stop reason: HOSPADM

## 2023-12-31 RX ORDER — ENOXAPARIN SODIUM 100 MG/ML
1 INJECTION SUBCUTANEOUS
Status: DISCONTINUED | OUTPATIENT
Start: 2023-12-31 | End: 2024-01-08 | Stop reason: HOSPADM

## 2023-12-31 RX ORDER — NITROGLYCERIN 0.4 MG/1
0.4 TABLET SUBLINGUAL EVERY 5 MIN PRN
Status: DISCONTINUED | OUTPATIENT
Start: 2023-12-31 | End: 2024-01-08 | Stop reason: HOSPADM

## 2023-12-31 RX ADMIN — AMLODIPINE BESYLATE 10 MG: 5 TABLET ORAL at 09:12

## 2023-12-31 RX ADMIN — LEVOTHYROXINE SODIUM 75 MCG: 25 TABLET ORAL at 09:12

## 2023-12-31 RX ADMIN — CINACALCET 30 MG: 30 TABLET, FILM COATED ORAL at 09:12

## 2023-12-31 RX ADMIN — ENOXAPARIN SODIUM 60 MG: 80 INJECTION SUBCUTANEOUS at 08:12

## 2023-12-31 RX ADMIN — FUROSEMIDE 20 MG: 10 INJECTION, SOLUTION INTRAMUSCULAR; INTRAVENOUS at 09:12

## 2023-12-31 RX ADMIN — LOSARTAN POTASSIUM 12.5 MG: 25 TABLET, FILM COATED ORAL at 09:12

## 2023-12-31 RX ADMIN — DULOXETINE HYDROCHLORIDE 60 MG: 30 CAPSULE, DELAYED RELEASE ORAL at 09:12

## 2023-12-31 RX ADMIN — METOPROLOL SUCCINATE 25 MG: 25 TABLET, EXTENDED RELEASE ORAL at 09:12

## 2023-12-31 RX ADMIN — DULOXETINE HYDROCHLORIDE 60 MG: 30 CAPSULE, DELAYED RELEASE ORAL at 08:12

## 2023-12-31 RX ADMIN — ATORVASTATIN CALCIUM 40 MG: 40 TABLET, FILM COATED ORAL at 01:12

## 2023-12-31 RX ADMIN — GABAPENTIN 300 MG: 300 CAPSULE ORAL at 09:12

## 2023-12-31 RX ADMIN — GABAPENTIN 300 MG: 300 CAPSULE ORAL at 08:12

## 2023-12-31 RX ADMIN — CEFTRIAXONE 1 G: 1 INJECTION, POWDER, FOR SOLUTION INTRAMUSCULAR; INTRAVENOUS at 02:12

## 2023-12-31 RX ADMIN — GUAIFENESIN 200 MG: 200 SOLUTION ORAL at 05:12

## 2023-12-31 RX ADMIN — METOPROLOL SUCCINATE 25 MG: 25 TABLET, EXTENDED RELEASE ORAL at 08:12

## 2023-12-31 RX ADMIN — ASPIRIN 81 MG CHEWABLE TABLET 81 MG: 81 TABLET CHEWABLE at 09:12

## 2023-12-31 RX ADMIN — ACETAMINOPHEN 650 MG: 325 TABLET, FILM COATED ORAL at 08:12

## 2023-12-31 RX ADMIN — TRAZODONE HYDROCHLORIDE 100 MG: 100 TABLET ORAL at 08:12

## 2023-12-31 RX ADMIN — PRAVASTATIN SODIUM 20 MG: 10 TABLET ORAL at 09:12

## 2023-12-31 RX ADMIN — GUAIFENESIN 200 MG: 200 SOLUTION ORAL at 11:12

## 2023-12-31 RX ADMIN — HEPARIN SODIUM 14 UNITS/KG/HR: 10000 INJECTION, SOLUTION INTRAVENOUS at 04:12

## 2023-12-31 RX ADMIN — IOPAMIDOL 100 ML: 755 INJECTION, SOLUTION INTRAVENOUS at 12:12

## 2023-12-31 RX ADMIN — AZITHROMYCIN MONOHYDRATE 500 MG: 500 INJECTION, POWDER, LYOPHILIZED, FOR SOLUTION INTRAVENOUS at 05:12

## 2023-12-31 NOTE — PROGRESS NOTES
"    [facility]    Cardiology  Progress Note    Patient Name: Edwin Boyer  MRN: [id number]  Admission Date: 2023  Hospital Length of Stay: 2 days  Code Status: Full Code   Attending Provider: Josué Crooks MD   Consulting Provider: BERRY Rivera  Primary Care Physician: Mariely Soto MD  Principal Problem:NSTEMI (non-ST elevated myocardial infarction)    Patient information was obtained from patient, past medical records, and ER records.     Subjective:     Chief Complaint: Reason for Consult: Elevated Troponin      HPI: Ms. Boyer is an 89 y/o female who is known to [facility], Dr. Guidry. The patient presented to [facility] ER on 23 with c/o a Cough. She reported that about 2 days prior she developed a cough which progressively worsened and her Pulse Oxymetry at Home was 86% on RA. She denied CP, Fever, Chills and SOB. Reported Mild SOB at Rest. Secondary to these findings and symptoms she presented to the ER for evaluation. Upon Evaluation in the ER she was found to have an RA O2 saturation in the Low 80s and began to endorse CP with a Cough. The CP was not presented unless she was coughing. The Pain was 8/10 on a verbal scale with coughing and described as a "sharp pain". She reported that the cough was productive and yielded a yellow/green sputum. In the ER she was found to have a WBC 14.70, Na 130, K 3.6, Cl 95, .2, Troponin 1.936, and Negative Flu A/B and COVID Screenings. She was started on IV Abx and ASA 325mg x 1. [facility] has been consulted for NSTEMI in the Setting of Pneumonia.     23: NAD. "I am ok." Denies CP and SOB.   23: NAD. "I am feeling better today." Denies CP and SOB. VSS.    PMH: Anxiety, Carpel Tunnel Syndrome Bilaterally, CKD Stage III, Fibromyalgia, GERD, HTN, HLD, Hypothyroidism, OA, Osteoporosis, Hyperparathyroidism, Urinary Incontinence, VHD (Mod MR)  PSH: Appendectomy, , Colonoscopy, EMERSON, TKA, Breast Biopsy,   Family History: " Father, D, H; Mother, D, HF  Social History: Denies Illicit Drug, ETOH and Tobacco Use    Previous Cardiac Diagnostics:   ECHO 12.29.23:  Left Ventricle: The left ventricle is normal in size. Normal wall thickness. Normal wall motion. Mid septal wall is slightly hypokinetic. There is normal systolic function with a visually estimated ejection fraction of 55 - 60%. Grade II diastolic dysfunction.  Right Ventricle: Normal right ventricular cavity size. Systolic function is normal.  Left Atrium: Left atrium is dilated.  Aortic Valve: The aortic valve is a trileaflet valve. There is moderate aortic valve sclerosis. Mildly calcified cusps. There is no stenosis. Aortic valve peak velocity is 1.55 m/s. Mean gradient is 5 mmHg. There is mild aortic regurgitation.  Mitral Valve: Moderately thickened leaflets. There is mild mitral annular calcification present. There is mild stenosis. The mean pressure gradient across the mitral valve is 5 mmHg at a heart rate of 79 bpm. There is mild regurgitation.  Tricuspid Valve: There is no stenosis. There is mild regurgitation.  Pulmonic Valve: There is no stenosis.    ECHO 5.3.23:  The study quality is average.   The left ventricle is normal in size. Global left ventricular systolic function is normal. The left ventricular ejection fraction is 60%. Left ventricular diastolic function is normal. Mild asymmetric septal left ventricular hypertrophy is present.  The left atrial diameter is mildly increased.  Moderate (2+) mitral regurgitation. PISA is 0.54 cm. The MR volume color doppler method is 20 ml. EROA measures 0.1 cm². Vena contracta is 4.5 mm.   Mild (1+) tricuspid regurgitation. Mild (1+) aortic regurgitation. Mild (1+) pulmonic regurgitation.   The estimated pulmonary artery systolic pressure is 39 mmHg assuming a right atrial pressure of 3 mmHg.     Calcium Score 5.18.18:  Total Calcium Score 299    MPI 10.26.11:  This is a normal perfusion study, no perfusion defects noted.    This scan is suggestive of low risk for future cardiovascular events.  The left ventricular cavity is noted to be normal on the stress study. The left ventricular ejection fraction was calculated to be 74% and left ventricular global function is normal.   The study quality is good.    Review of patient's allergies indicates:   Allergen Reactions    Meperidine      Other reaction(s): EXCITATIVE TYPE PSYCHOSIS    Sulfamethoxazole      No current facility-administered medications on file prior to encounter.     Current Outpatient Medications on File Prior to Encounter   Medication Sig    acetaminophen-codeine 300-30mg (TYLENOL #3) 300-30 mg Tab Take 1 tablet by mouth daily as needed (pain).    amLODIPine (NORVASC) 10 MG tablet Take 1 tablet (10 mg total) by mouth once daily.    cinacalcet (SENSIPAR) 30 MG Tab Take 1 tablet (30 mg total) by mouth once daily.    DULoxetine (CYMBALTA) 60 MG capsule Take 1 capsule (60 mg total) by mouth every morning. (Patient taking differently: Take 60 mg by mouth 2 (two) times daily.)    gabapentin (NEURONTIN) 300 MG capsule Take 1 capsule (300 mg total) by mouth 2 (two) times daily.    levothyroxine (SYNTHROID) 75 MCG tablet Take 1 tablet (75 mcg total) by mouth once daily.    losartan (COZAAR) 25 MG tablet Take 0.5 tablets (12.5 mg total) by mouth once daily.    metoprolol succinate (TOPROL-XL) 25 MG 24 hr tablet Take 1 tablet (25 mg total) by mouth 2 (two) times daily.    pravastatin (PRAVACHOL) 20 MG tablet Take 20 mg by mouth once daily.    traZODone (DESYREL) 100 MG tablet Take 1 tablet (100 mg total) by mouth every evening.     Review of Systems   Constitutional:  Positive for fatigue. Negative for chills and fever.   Respiratory:  Negative for shortness of breath.    Cardiovascular:  Negative for chest pain, palpitations and leg swelling.   All other systems reviewed and are negative.    Objective:     Vital Signs (Most Recent):  Temp: 97.2 °F (36.2 °C) (12/31/23 1100)  Pulse:  83 (12/31/23 1100)  Resp: 18 (12/31/23 1100)  BP: 121/65 (12/31/23 1100)  SpO2: 95 % (12/31/23 1100) Vital Signs (24h Range):  Temp:  [97.2 °F (36.2 °C)-98.8 °F (37.1 °C)] 97.2 °F (36.2 °C)  Pulse:  [70-83] 83  Resp:  [18-20] 18  SpO2:  [91 %-95 %] 95 %  BP: (108-144)/(63-72) 121/65     Weight: 64.4 kg (142 lb)  Body mass index is 26.83 kg/m².    SpO2: 95 %         Intake/Output Summary (Last 24 hours) at 12/31/2023 1243  Last data filed at 12/31/2023 0707  Gross per 24 hour   Intake 960 ml   Output 1002 ml   Net -42 ml       Lines/Drains/Airways       Drain  Duration             Female External Urinary Catheter w/ Suction 12/29/23 2 days              Peripheral Intravenous Line  Duration                  Peripheral IV - Single Lumen 12/29/23 1544 20 G Left Antecubital 1 day                  Significant Labs:  Recent Results (from the past 72 hour(s))   COVID/FLU A&B PCR    Collection Time: 12/29/23 11:55 AM   Result Value Ref Range    Influenza A PCR Not Detected Not Detected    Influenza B PCR Not Detected Not Detected    SARS-CoV-2 PCR Not Detected Not Detected, Negative   Comprehensive Metabolic Panel    Collection Time: 12/29/23 12:15 PM   Result Value Ref Range    Sodium Level 130 (L) 136 - 145 mmol/L    Potassium Level 3.6 3.5 - 5.1 mmol/L    Chloride 95 (L) 98 - 107 mmol/L    Carbon Dioxide 24 23 - 31 mmol/L    Glucose Level 141 (H) 82 - 115 mg/dL    Blood Urea Nitrogen 18.5 9.8 - 20.1 mg/dL    Creatinine 0.86 0.55 - 1.02 mg/dL    Calcium Level Total 8.1 (L) 8.4 - 10.2 mg/dL    Protein Total 6.4 5.8 - 7.6 gm/dL    Albumin Level 3.4 3.4 - 4.8 g/dL    Globulin 3.0 2.4 - 3.5 gm/dL    Albumin/Globulin Ratio 1.1 1.1 - 2.0 ratio    Bilirubin Total 0.8 <=1.5 mg/dL    Alkaline Phosphatase 52 40 - 150 unit/L    Alanine Aminotransferase 13 0 - 55 unit/L    Aspartate Aminotransferase 28 5 - 34 unit/L    eGFR >60 mls/min/1.73/m2   Troponin I    Collection Time: 12/29/23 12:15 PM   Result Value Ref Range    Troponin-I  1.936 (H) 0.000 - 0.045 ng/mL   BNP    Collection Time: 12/29/23 12:16 PM   Result Value Ref Range    Natriuretic Peptide 849.2 (H) <=100.0 pg/mL   CBC with Differential    Collection Time: 12/29/23 12:16 PM   Result Value Ref Range    WBC 14.70 (H) 4.50 - 11.50 x10(3)/mcL    RBC 4.64 4.20 - 5.40 x10(6)/mcL    Hgb 15.0 12.0 - 16.0 g/dL    Hct 43.0 37.0 - 47.0 %    MCV 92.7 80.0 - 94.0 fL    MCH 32.3 (H) 27.0 - 31.0 pg    MCHC 34.9 33.0 - 36.0 g/dL    RDW 12.0 11.5 - 17.0 %    Platelet 187 130 - 400 x10(3)/mcL    MPV 9.2 7.4 - 10.4 fL    Neut % 86.9 %    Lymph % 5.6 %    Mono % 6.8 %    Eos % 0.0 %    Basophil % 0.2 %    Lymph # 0.83 0.6 - 4.6 x10(3)/mcL    Neut # 12.76 (H) 2.1 - 9.2 x10(3)/mcL    Mono # 1.00 0.1 - 1.3 x10(3)/mcL    Eos # 0.00 0 - 0.9 x10(3)/mcL    Baso # 0.03 <=0.2 x10(3)/mcL    IG# 0.08 (H) 0 - 0.04 x10(3)/mcL    IG% 0.5 %    NRBC% 0.0 %   Urinalysis, Reflex to Urine Culture    Collection Time: 12/29/23  1:33 PM    Specimen: Urine   Result Value Ref Range    Color, UA Light-Yellow Yellow, Light-Yellow, Colorless, Straw, Dark-Yellow    Appearance, UA Clear Clear    Specific Gravity, UA 1.010 1.005 - 1.030    pH, UA 5.5 5.0 - 8.5    Protein, UA Trace (A) Negative    Glucose, UA Normal Negative, Normal    Ketones, UA Negative Negative    Blood, UA Negative Negative    Bilirubin, UA Negative Negative    Urobilinogen, UA Normal 0.2, 1.0, Normal    Nitrites, UA Negative Negative    Leukocyte Esterase, UA Negative Negative    WBC, UA 0-5 None Seen, 0-2, 3-5, 0-5 /HPF    Bacteria, UA Moderate (A) None Seen, Trace /HPF    Squamous Epithelial Cells, UA Trace None Seen /HPF    RBC, UA 0-5 None Seen, 0-2, 3-5, 0-5 /HPF   Lactic acid, plasma    Collection Time: 12/29/23  3:12 PM   Result Value Ref Range    Lactic Acid Level 1.3 0.5 - 2.2 mmol/L   APTT    Collection Time: 12/29/23  5:16 PM   Result Value Ref Range    .8 (HH) 23.2 - 33.7 seconds   Protime-INR    Collection Time: 12/29/23  5:16 PM   Result  Value Ref Range    PT 14.8 (H) 12.5 - 14.5 seconds    INR 1.2 <=1.3   Troponin I    Collection Time: 12/29/23  5:16 PM   Result Value Ref Range    Troponin-I 2.442 (H) 0.000 - 0.045 ng/mL   Echo Saline Bubble? No    Collection Time: 12/29/23  5:28 PM   Result Value Ref Range    BSA 1.66 m2    Avitia's Biplane MOD Ejection Fraction 58 %    LVOT stroke volume 49.68 cm3    LV Systolic Volume 25.60 mL    LV Systolic Volume Index 15.7 mL/m2    LV Diastolic Volume 61.20 mL    LV Diastolic Volume Index 37.55 mL/m2    LVOT diameter 1.70 cm    LVOT area 2.3 cm2    MV Peak E Toby 1.20 m/s    TDI LATERAL 0.06 m/s    TDI SEPTAL 0.07 m/s    E/E' ratio 18.46 m/s    MV Peak A Toby 1.12 m/s    TR Max Toby 2.63 m/s    E/A ratio 1.07     E wave deceleration time 177.00 msec    LV SEPTAL E/E' RATIO 17.14 m/s    LV LATERAL E/E' RATIO 20.00 m/s    LVOT peak toby 1.10 m/s    Left Ventricular Outflow Tract Mean Velocity 0.77 cm/s    Left Ventricular Outflow Tract Mean Gradient 3.00 mmHg    TAPSE 1.98 cm    LA volume (mod) 44.80 cm3    LA Volume Index (Mod) 27.5 mL/m2    RA Major Axis 3.90 cm    RA Width 2.90 cm    AV regurgitation pressure 1/2 time 343 ms    AR Max Toby 3.61 m/s    AV mean gradient 5 mmHg    AV peak gradient 10 mmHg    Ao peak toby 1.55 m/s    Ao VTI 31.90 cm    LVOT peak VTI 21.90 cm    AV valve area 1.56 cm²    AV Velocity Ratio 0.71     AV index (prosthetic) 0.69     TITO by Velocity Ratio 1.61 cm²    MV mean gradient 5 mmHg    MV peak gradient 8 mmHg    MV valve area by continuity eq 1.73 cm2    MV VTI 28.7 cm    Triscuspid Valve Regurgitation Peak Gradient 28 mmHg    Mean e' 0.07 m/s    Mitral Valve Heart Rate 79 bpm   APTT    Collection Time: 12/29/23  6:45 PM   Result Value Ref Range    PTT >200.0 (HH) 23.2 - 33.7 seconds   Troponin I    Collection Time: 12/29/23 11:12 PM   Result Value Ref Range    Troponin-I 2.126 (H) 0.000 - 0.045 ng/mL   APTT    Collection Time: 12/30/23 12:12 AM   Result Value Ref Range    PTT 36.2  (H) 23.2 - 33.7 seconds   Blood Culture    Collection Time: 12/30/23 12:12 AM    Specimen: Blood, Venous   Result Value Ref Range    CULTURE, BLOOD ([facility]) No Growth At 24 Hours    Blood Culture    Collection Time: 12/30/23 12:12 AM    Specimen: Blood, Venous   Result Value Ref Range    CULTURE, BLOOD ([facility]) No Growth At 24 Hours    APTT    Collection Time: 12/30/23 12:45 AM   Result Value Ref Range    PTT 37.3 (H) 23.2 - 33.7 seconds   Respiratory Panel    Collection Time: 12/30/23 12:45 AM   Result Value Ref Range    Adenovirus Detected (A) Not Detected    Coronavirus 229E Not Detected Not Detected    Coronavirus HKU1 Not Detected Not Detected    Coronavirus NL63 Not Detected Not Detected    Coronavirus OC43 PCR, Common Cold Virus Not Detected Not Detected    Human Metapneumovirus Not Detected Not Detected    Parainfluenza Virus 1 Not Detected Not Detected    Parainfluenza Virus 2 Not Detected Not Detected    Parainfluenza Virus 3 Not Detected Not Detected    Parainfluenza Virus 4 Not Detected Not Detected    Bordetella pertussis (ptxP) Not Detected Not Detected    Chlamydia pneumoniae Not Detected Not Detected    Mycoplasma pneumoniae Not Detected Not Detected    Human Rhinovirus/Enterovirus Not Detected Not Detected    Bordetella parapertussis (BN9712) Not Detected Not Detected   MRSA PCR    Collection Time: 12/30/23 12:45 AM   Result Value Ref Range    MRSA PCR SCRN ([facility]) Not Detected Not Detected   APTT    Collection Time: 12/30/23  2:09 AM   Result Value Ref Range    PTT >200.0 (HH) 23.2 - 33.7 seconds   APTT    Collection Time: 12/30/23  3:52 AM   Result Value Ref Range    PTT 48.2 (H) 23.2 - 33.7 seconds   Comprehensive Metabolic Panel    Collection Time: 12/30/23  7:27 AM   Result Value Ref Range    Sodium Level 131 (L) 136 - 145 mmol/L    Potassium Level 3.6 3.5 - 5.1 mmol/L    Chloride 97 (L) 98 - 107 mmol/L    Carbon Dioxide 23 23 - 31 mmol/L    Glucose Level 93 82 - 115 mg/dL    Blood Urea Nitrogen 17.2  9.8 - 20.1 mg/dL    Creatinine 0.70 0.55 - 1.02 mg/dL    Calcium Level Total 7.6 (L) 8.4 - 10.2 mg/dL    Protein Total 5.7 (L) 5.8 - 7.6 gm/dL    Albumin Level 2.9 (L) 3.4 - 4.8 g/dL    Globulin 2.8 2.4 - 3.5 gm/dL    Albumin/Globulin Ratio 1.0 (L) 1.1 - 2.0 ratio    Bilirubin Total 0.6 <=1.5 mg/dL    Alkaline Phosphatase 51 40 - 150 unit/L    Alanine Aminotransferase 13 0 - 55 unit/L    Aspartate Aminotransferase 32 5 - 34 unit/L    eGFR >60 mls/min/1.73/m2   Magnesium    Collection Time: 12/30/23  7:27 AM   Result Value Ref Range    Magnesium Level 1.40 (L) 1.60 - 2.60 mg/dL   Troponin I    Collection Time: 12/30/23  7:27 AM   Result Value Ref Range    Troponin-I     CBC with Differential    Collection Time: 12/30/23  7:27 AM   Result Value Ref Range    WBC 12.43 (H) 4.50 - 11.50 x10(3)/mcL    RBC 4.62 4.20 - 5.40 x10(6)/mcL    Hgb 14.7 12.0 - 16.0 g/dL    Hct 42.6 37.0 - 47.0 %    MCV 92.2 80.0 - 94.0 fL    MCH 31.8 (H) 27.0 - 31.0 pg    MCHC 34.5 33.0 - 36.0 g/dL    RDW 12.0 11.5 - 17.0 %    Platelet 193 130 - 400 x10(3)/mcL    MPV 9.3 7.4 - 10.4 fL    Neut % 84.1 %    Lymph % 9.2 %    Mono % 5.9 %    Eos % 0.0 %    Basophil % 0.2 %    Lymph # 1.14 0.6 - 4.6 x10(3)/mcL    Neut # 10.47 (H) 2.1 - 9.2 x10(3)/mcL    Mono # 0.73 0.1 - 1.3 x10(3)/mcL    Eos # 0.00 0 - 0.9 x10(3)/mcL    Baso # 0.02 <=0.2 x10(3)/mcL    IG# 0.07 (H) 0 - 0.04 x10(3)/mcL    IG% 0.6 %    NRBC% 0.0 %   Troponin I    Collection Time: 12/30/23 11:23 AM   Result Value Ref Range    Troponin-I 1.553 (H) 0.000 - 0.045 ng/mL   APTT    Collection Time: 12/30/23 11:23 AM   Result Value Ref Range    PTT 35.3 (H) 23.2 - 33.7 seconds   APTT    Collection Time: 12/30/23  6:51 PM   Result Value Ref Range    PTT 48.1 (H) 23.2 - 33.7 seconds   Magnesium    Collection Time: 12/31/23  3:51 AM   Result Value Ref Range    Magnesium Level 2.00 1.60 - 2.60 mg/dL   Comprehensive Metabolic Panel    Collection Time: 12/31/23  3:51 AM   Result Value Ref Range     Sodium Level 130 (L) 136 - 145 mmol/L    Potassium Level 3.5 3.5 - 5.1 mmol/L    Chloride 94 (L) 98 - 107 mmol/L    Carbon Dioxide 26 23 - 31 mmol/L    Glucose Level 99 82 - 115 mg/dL    Blood Urea Nitrogen 17.4 9.8 - 20.1 mg/dL    Creatinine 0.74 0.55 - 1.02 mg/dL    Calcium Level Total 7.3 (L) 8.4 - 10.2 mg/dL    Protein Total 5.3 (L) 5.8 - 7.6 gm/dL    Albumin Level 2.6 (L) 3.4 - 4.8 g/dL    Globulin 2.7 2.4 - 3.5 gm/dL    Albumin/Globulin Ratio 1.0 (L) 1.1 - 2.0 ratio    Bilirubin Total 0.5 <=1.5 mg/dL    Alkaline Phosphatase 52 40 - 150 unit/L    Alanine Aminotransferase 24 0 - 55 unit/L    Aspartate Aminotransferase 43 (H) 5 - 34 unit/L    eGFR >60 mls/min/1.73/m2   Lipid Panel    Collection Time: 12/31/23  3:51 AM   Result Value Ref Range    Cholesterol Total 124 <=200 mg/dL    HDL Cholesterol 22 (L) 35 - 60 mg/dL    Triglyceride 155 (H) 37 - 140 mg/dL    Cholesterol/HDL Ratio 6 (H) 0 - 5    Very Low Density Lipoprotein 31     LDL Cholesterol 71.00 50.00 - 140.00 mg/dL   APTT    Collection Time: 12/31/23  3:51 AM   Result Value Ref Range    PTT 50.2 (H) 23.2 - 33.7 seconds   CBC with Differential    Collection Time: 12/31/23  3:51 AM   Result Value Ref Range    WBC 11.21 4.50 - 11.50 x10(3)/mcL    RBC 4.52 4.20 - 5.40 x10(6)/mcL    Hgb 14.2 12.0 - 16.0 g/dL    Hct 41.6 37.0 - 47.0 %    MCV 92.0 80.0 - 94.0 fL    MCH 31.4 (H) 27.0 - 31.0 pg    MCHC 34.1 33.0 - 36.0 g/dL    RDW 11.8 11.5 - 17.0 %    Platelet 212 130 - 400 x10(3)/mcL    MPV 9.8 7.4 - 10.4 fL    NRBC% 0.0 %   Manual Differential    Collection Time: 12/31/23  3:51 AM   Result Value Ref Range    WBC 11.21 x10(3)/mcL    Neutrophils % 87 %    Lymphs % 6 %    Monocytes % 3 %    Plasmacytes % 5 %    Neutrophils Abs 9.7527 (H) 2.1 - 9.2 x10(3)/mcL    Lymphs Abs 0.6726 0.6 - 4.6 x10(3)/mcL    Monocytes Abs 0.3363 0.1 - 1.3 x10(3)/mcL    Platelets Normal Normal, Adequate    RBC Morph Normal Normal   Path Review, Peripheral Smear    Collection Time:  12/31/23  3:51 AM   Result Value Ref Range    Peripheral Smear Evaluation       Unremarkable granulocytic maturation with absolute neutrophilia, favor reactive, a few monocytes, mature lymphocytes, a few plasmacytoid cells.  Normocytic normochromic red blood cell population with no significant anisocytosis.  Unremarkable platelet morphology.    NOTE: recommend flow cytometry analysis for proper assessment of circulating plasma cells if clinically indicated.    Long Rutherford M.D., Ph.D.        D-Dimer, Quantitative    Collection Time: 12/31/23  3:51 AM   Result Value Ref Range    D-Dimer 3.09 (H) 0.00 - 0.50 ug/mL FEU     Significant Imaging:  Imaging Results              X-Ray Chest 1 View (Final result)  Result time 12/29/23 12:13:33      Final result by Javier Mcneil MD (12/29/23 12:13:33)                   Impression:      Minimal blunting of the right costophrenic angle as above.    Slightly more confluent opacities in the right cardiophrenic angle region early infiltrate can not be completely excluded.    Otherwise no active pulmonary disease and no change as compared with previous exam      Electronically signed by: Javier Mcneil  Date:    12/29/2023  Time:    12:13               Narrative:    EXAMINATION:  XR CHEST 1 VIEW    CPT 74341    CLINICAL HISTORY:  shortness of breath;    COMPARISON:  July 6, 2020    FINDINGS:  Examination reveals mediastinal cardiac silhouette to be within normal limits left lung field is clear and free of gross infiltrates atelectasis or effusions.    Slightly more confluent opacities identified in the right cardiophrenic angle region early infiltrate can not be completely excluded.    There might be minimal blunting of the right costophrenic angle which may indicate the presence of a small right-sided pleural effusion                                    Telemetry: SR     Physical Exam  Constitutional:       General: She is not in acute distress.     Appearance: Normal  appearance.   HENT:      Head: Normocephalic.      Mouth/Throat:      Mouth: Mucous membranes are moist.   Eyes:      Extraocular Movements: Extraocular movements intact.      Conjunctiva/sclera: Conjunctivae normal.   Cardiovascular:      Rate and Rhythm: Normal rate and regular rhythm.      Pulses: Normal pulses.      Heart sounds: Murmur: 2/6 TU.   Pulmonary:      Effort: Pulmonary effort is normal. No respiratory distress.      Comments: NC O2  Abdominal:      Palpations: Abdomen is soft.   Musculoskeletal:         General: No swelling. Normal range of motion.      Right lower leg: No edema.      Left lower leg: No edema.   Skin:     General: Skin is warm and dry.   Neurological:      General: No focal deficit present.      Mental Status: She is alert and oriented to person, place, and time.   Psychiatric:         Mood and Affect: Mood normal.         Behavior: Behavior normal.       Home Medications:   No current facility-administered medications on file prior to encounter.     Current Outpatient Medications on File Prior to Encounter   Medication Sig Dispense Refill    acetaminophen-codeine 300-30mg (TYLENOL #3) 300-30 mg Tab Take 1 tablet by mouth daily as needed (pain).      amLODIPine (NORVASC) 10 MG tablet Take 1 tablet (10 mg total) by mouth once daily. 90 tablet 3    cinacalcet (SENSIPAR) 30 MG Tab Take 1 tablet (30 mg total) by mouth once daily. 30 tablet 3    DULoxetine (CYMBALTA) 60 MG capsule Take 1 capsule (60 mg total) by mouth every morning. (Patient taking differently: Take 60 mg by mouth 2 (two) times daily.) 30 capsule 11    gabapentin (NEURONTIN) 300 MG capsule Take 1 capsule (300 mg total) by mouth 2 (two) times daily. 60 capsule 11    levothyroxine (SYNTHROID) 75 MCG tablet Take 1 tablet (75 mcg total) by mouth once daily. 90 tablet 3    losartan (COZAAR) 25 MG tablet Take 0.5 tablets (12.5 mg total) by mouth once daily. 45 tablet 3    metoprolol succinate (TOPROL-XL) 25 MG 24 hr tablet  Take 1 tablet (25 mg total) by mouth 2 (two) times daily. 180 tablet 1    pravastatin (PRAVACHOL) 20 MG tablet Take 20 mg by mouth once daily.      traZODone (DESYREL) 100 MG tablet Take 1 tablet (100 mg total) by mouth every evening. 30 tablet 11     Current Inpatient Medications:    Current Facility-Administered Medications:     0.9%  NaCl infusion, , Intravenous, PRN, Brendon Portillo MD, Last Rate: 35 mL/hr at 12/30/23 1051, New Bag at 12/30/23 1051    acetaminophen tablet 650 mg, 650 mg, Oral, Q8H PRN, Evert Murillo MD    acetaminophen tablet 650 mg, 650 mg, Oral, Q8H PRN, Evert Murillo MD    acetaminophen-codeine 300-30mg per tablet 1 tablet, 1 tablet, Oral, Daily PRN, Evert Murillo MD    albuterol-ipratropium 2.5 mg-0.5 mg/3 mL nebulizer solution 3 mL, 3 mL, Nebulization, Q4H PRN, Evert Murillo MD    amLODIPine tablet 10 mg, 10 mg, Oral, Daily, Evert Murillo MD, 10 mg at 12/31/23 0915    aspirin chewable tablet 81 mg, 81 mg, Oral, Daily, Bakari Murillo ANP, 81 mg at 12/31/23 0914    azithromycin 500 mg in dextrose 5 % 250 mL IVPB (ready to mix), 500 mg, Intravenous, Q24H, Evert Murillo MD, Stopped at 12/30/23 1810    cefTRIAXone (ROCEPHIN) 1 g in dextrose 5 % in water (D5W) 100 mL IVPB (MB+), 1 g, Intravenous, Q24H, Evert Murillo MD, Stopped at 12/30/23 1559    cinacalcet tablet 30 mg, 30 mg, Oral, Daily, Evert Murillo MD, 30 mg at 12/31/23 0914    DULoxetine DR capsule 60 mg, 60 mg, Oral, BID, Evert Murillo MD, 60 mg at 12/31/23 0914    furosemide injection 20 mg, 20 mg, Intravenous, Daily, Josué Crooks MD, 20 mg at 12/31/23 0942    gabapentin capsule 300 mg, 300 mg, Oral, BID, Evert Murillo MD, 300 mg at 12/31/23 0915    guaiFENesin 100 mg/5 ml syrup 200 mg, 200 mg, Oral, Q6H, Josué Crooks MD, 200 mg at 12/31/23 1151    heparin 25,000 units in dextrose 5% (100 units/ml) IV bolus from bag - ADDITIONAL PRN BOLUS  - 30 units/kg (max bolus 4000 units), 30 Units/kg (Adjusted), Intravenous, PRN, Bakari Murillo ANP    heparin 25,000 units in dextrose 5% (100 units/ml) IV bolus from bag - ADDITIONAL PRN BOLUS - 60 units/kg (max bolus 4000 units), 60 Units/kg (Adjusted), Intravenous, PRN, Bakari Murillo ANP, 3,260 Units at 12/30/23 1000    heparin 25,000 units in dextrose 5% 250 mL (100 units/mL) infusion LOW INTENSITY nomogram - LAF, 0-40 Units/kg/hr (Adjusted), Intravenous, Continuous, Bakari Murillo ANP, Last Rate: 9.2 mL/hr at 12/31/23 0528, 17 Units/kg/hr at 12/31/23 0528    levothyroxine tablet 75 mcg, 75 mcg, Oral, Daily, Evert Murillo MD, 75 mcg at 12/31/23 0914    losartan split tablet 12.5 mg, 12.5 mg, Oral, Daily, Evert Murillo MD, 12.5 mg at 12/31/23 0914    melatonin tablet 6 mg, 6 mg, Oral, Nightly PRN, Evert Murillo MD    metoprolol succinate (TOPROL-XL) 24 hr tablet 25 mg, 25 mg, Oral, BID, Evert Murillo MD, 25 mg at 12/31/23 0914    pravastatin tablet 20 mg, 20 mg, Oral, Daily, Evert Murillo MD, 20 mg at 12/31/23 0914    sodium chloride 0.9% flush 10 mL, 10 mL, Intravenous, PRN, Evert Murillo MD    traZODone tablet 100 mg, 100 mg, Oral, QHS, Evert Murillo MD, 100 mg at 12/30/23 2047    VTE Risk Mitigation (From admission, onward)           Ordered     IP VTE HIGH RISK PATIENT  Once         12/30/23 0022     Place sequential compression device  Until discontinued         12/30/23 0022     heparin 25,000 units in dextrose 5% (100 units/ml) IV bolus from bag - ADDITIONAL PRN BOLUS - 60 units/kg (max bolus 4000 units)  As needed (PRN)        Question:  Heparin Infusion Adjustment (DO NOT MODIFY ANSWER)  Answer:  [email or web address]\Images\Pharmacy\HeparinInfusions\heparin LOW INTENSITY nomogram for [facility] [id number].pdf    12/29/23 1520     heparin 25,000 units in dextrose 5% (100 units/ml) IV bolus from bag - ADDITIONAL PRN BOLUS - 30 units/kg (max bolus 4000  units)  As needed (PRN)        Question:  Heparin Infusion Adjustment (DO NOT MODIFY ANSWER)  Answer:  [email or web address]\epic\Images\Pharmacy\HeparinInfusions\heparin LOW INTENSITY nomogram for [facility] [id number].pdf    12/29/23 1520     heparin 25,000 units in dextrose 5% 250 mL (100 units/mL) infusion LOW INTENSITY nomogram - [facility]  Continuous        Question:  Begin at (units/kg/hr)  Answer:  12 12/29/23 1520                  Assessment:   NSTEMI - Type Unspecified    - Troponin Peak with Initial Troponin 2.126    - ECHO (12.29.23) - LVEF 55-60%, Normal Wall Motion     - Coronary Calcium Score 5.18.18 - Total 229    - MPI (2011) - Normal Perfusion Study, No Perfusion Defects Noted  CAP  Leukocytosis - Resolved   Cough 2/2 Above  CP 2/2 Above - Atypical   HTN  HLD  Anxiety  GERD  Fibromyalgia  OA  Hypothyroidism  No Hx of GIB    Plan:   Treatment of Pneumonitis per Primary Team  ECHO Reviewed   Continue ASA  D/C Pravastatin  Start Atorvastatin 40mg PO Qday  D/C Heparin Drip - Medical Management Tx of NSTEMI for 48 Hours Complete   Plan for Ischemic Workup prior to D/C and Tx of CAP Started (Possibly on Tuesday 1.2.24)  Keep K > 4.0 and Mg > 2.0   Will Continue to Follow   Labs in AM: CBC, CMP and Mg    Bakari Murillo, ANP  Cardiology  [facility]  12/31/2023    I have seen the patient, reviewed the Nurse Practitioner's note, assessment and plan. I have personally interviewed and examined the patient at bedside and agree with the findings. Medical decision making listed above were done under my guidance.    Physical exam:  Cardiovascular system: regular rhythm, no murmur.  Lungs: CTAB.  Extremities: No leg edema.    Plan:  Plan for Cath  tuesday

## 2023-12-31 NOTE — PT/OT/SLP EVAL
[facility]  Speech Language Pathology Department  Clinical Swallow Evaluation    Patient Name:  Edwin Boyer   MRN:  [id number]    Recommendations:     General recommendations:  SLP intervention not indicated  Diet texture/consistency recommendations:  Regular solids (IDDSI 7) and thin liquids (IDDSI 0)  Medications: crushed in puree  Swallow strategies/precautions: small bites/sips, upright for PO intake, assist with feeding as needed, and feed only when fully alert  Precautions: Standard,      History:     Past Medical History:   Diagnosis Date    Anxiety 2022    Carpal tunnel syndrome, bilateral     Chronic back pain     CKD (chronic kidney disease) stage 3, GFR 30-59 ml/min 2022    Fibromyalgia     GERD with esophagitis 2022    HTN (hypertension), benign 2022    Hyperlipidemia 2022    Hypothyroidism 2022    Insomnia 2022    OA (osteoarthritis) 2022    Osteoporosis     Primary hyperparathyroidism 2023    Urinary incontinence 2022     Past Surgical History:   Procedure Laterality Date    APPENDECTOMY       SECTION      COLONOSCOPY      HYSTERECTOMY      TOTAL KNEE ARTHROPLASTY       Home diet texture/consistency: Regular and thin liquids  Current method of nutrition: PO diet (regular, thin liquids)    Patient complaint: Patient lethargic during exam, however, no specific complaints expressed.    Imaging   Results for orders placed during the hospital encounter of 23    X-Ray Chest 1 View    Narrative  EXAMINATION:  XR CHEST 1 VIEW    CPT 83960    CLINICAL HISTORY:  shortness of breath;    COMPARISON:  2020    FINDINGS:  Examination reveals mediastinal cardiac silhouette to be within normal limits left lung field is clear and free of gross infiltrates atelectasis or effusions.    Slightly more confluent opacities identified in the right cardiophrenic angle region early infiltrate can not be completely excluded.    There  might be minimal blunting of the right costophrenic angle which may indicate the presence of a small right-sided pleural effusion    Impression  Minimal blunting of the right costophrenic angle as above.    Slightly more confluent opacities in the right cardiophrenic angle region early infiltrate can not be completely excluded.    Otherwise no active pulmonary disease and no change as compared with previous exam      Electronically signed by: Javier Mcneil  Date:    12/29/2023  Time:    12:13    No results found for this or any previous visit.    No results found for this or any previous visit.    Subjective     Patient lethargic.    Spiritual/Cultural/Nondenominational Beliefs/Practices that affect care: no  Pain/Comfort: Pain Rating 1: 0/10    Objective:     ORAL MUSCULATURE  Dentition: own teeth  Secretion Management: adequate  Facial Movement: WFL  Buccal Strength & Mobility: WFL  Mandibular Strength & Mobility: WFL  Oral Labial Strength & Mobility: WFL  Lingual Strength & Mobility: WFL  Vocal Quality: adequate  Volitional Cough:  Clear/dry cough    Consistency Fed By Oral Symptoms Pharyngeal Symptoms   Thin liquid by cup SLP None None   Thin liquid by straw SLP None None   Chewable solid SLP Prolonged bolus formation/mastication None     Assessment:     No signs/symptoms of aspiration observed. Family denied any concerns/issues with PO intake at this time. ST to sign off.    Goals:     Multidisciplinary Problems       SLP Goals       Not on file                  Patient Education:     Patient and family were provided with verbal education regarding ST POC.  Understanding was verbalized.    Plan:     SLP Follow-Up:  No   Plan of Care reviewed with:  patient, family      Time Tracking:     SLP Treatment Date:   12/31/23  Speech Start Time:  0930  Speech Stop Time:  0950     Speech Total Time (min):  20 min    Billable minutes:  Swallow and Oral Function Evaluation, 20 minutes     12/31/2023

## 2023-12-31 NOTE — PLAN OF CARE
Problem: Adult Inpatient Plan of Care  Goal: Plan of Care Review  Outcome: Ongoing, Not Progressing  Goal: Patient-Specific Goal (Individualized)  Outcome: Ongoing, Not Progressing  Goal: Absence of Hospital-Acquired Illness or Injury  Outcome: Ongoing, Not Progressing  Goal: Optimal Comfort and Wellbeing  Outcome: Ongoing, Not Progressing  Goal: Readiness for Transition of Care  Outcome: Ongoing, Not Progressing     Problem: Fall Injury Risk  Goal: Absence of Fall and Fall-Related Injury  Outcome: Ongoing, Not Progressing     Problem: Skin Injury Risk Increased  Goal: Skin Health and Integrity  Outcome: Ongoing, Not Progressing

## 2024-01-01 LAB
ABS NEUT (OLG): 7.05 X10(3)/MCL (ref 2.1–9.2)
ALBUMIN SERPL-MCNC: 2.4 G/DL (ref 3.4–4.8)
ALBUMIN/GLOB SERPL: 0.8 RATIO (ref 1.1–2)
ALP SERPL-CCNC: 54 UNIT/L (ref 40–150)
ALT SERPL-CCNC: 29 UNIT/L (ref 0–55)
AST SERPL-CCNC: 48 UNIT/L (ref 5–34)
BASOPHILS NFR BLD MANUAL: 0.09 X10(3)/MCL (ref 0–0.2)
BASOPHILS NFR BLD MANUAL: 1 %
BILIRUB SERPL-MCNC: 0.4 MG/DL
BUN SERPL-MCNC: 17.7 MG/DL (ref 9.8–20.1)
CALCIUM SERPL-MCNC: 7.6 MG/DL (ref 8.4–10.2)
CHLORIDE SERPL-SCNC: 96 MMOL/L (ref 98–107)
CO2 SERPL-SCNC: 26 MMOL/L (ref 23–31)
CREAT SERPL-MCNC: 0.72 MG/DL (ref 0.55–1.02)
EOSINOPHIL NFR BLD MANUAL: 0.09 X10(3)/MCL (ref 0–0.9)
EOSINOPHIL NFR BLD MANUAL: 1 %
ERYTHROCYTE [DISTWIDTH] IN BLOOD BY AUTOMATED COUNT: 11.9 % (ref 11.5–17)
GFR SERPLBLD CREATININE-BSD FMLA CKD-EPI: >60 MLS/MIN/1.73/M2
GLOBULIN SER-MCNC: 3.2 GM/DL (ref 2.4–3.5)
GLUCOSE SERPL-MCNC: 90 MG/DL (ref 82–115)
HCT VFR BLD AUTO: 38 % (ref 37–47)
HGB BLD-MCNC: 12.9 G/DL (ref 12–16)
INSTRUMENT WBC (OLG): 9.16 X10(3)/MCL
LYMPHOCYTES NFR BLD MANUAL: 0.92 X10(3)/MCL
LYMPHOCYTES NFR BLD MANUAL: 10 %
MAGNESIUM SERPL-MCNC: 1.8 MG/DL (ref 1.6–2.6)
MCH RBC QN AUTO: 31.4 PG (ref 27–31)
MCHC RBC AUTO-ENTMCNC: 33.9 G/DL (ref 33–36)
MCV RBC AUTO: 92.5 FL (ref 80–94)
MONOCYTES NFR BLD MANUAL: 0.55 X10(3)/MCL (ref 0.1–1.3)
MONOCYTES NFR BLD MANUAL: 6 %
MYELOCYTES NFR BLD MANUAL: 1 %
NEUTROPHILS NFR BLD MANUAL: 77 %
NRBC BLD AUTO-RTO: 0 %
NRBC BLD MANUAL-RTO: 1 %
PLASMA CELLS BLD QL SMEAR: 4 %
PLATELET # BLD AUTO: 199 X10(3)/MCL (ref 130–400)
PLATELET # BLD EST: NORMAL 10*3/UL
PMV BLD AUTO: 9.6 FL (ref 7.4–10.4)
POTASSIUM SERPL-SCNC: 3.6 MMOL/L (ref 3.5–5.1)
PROT SERPL-MCNC: 5.6 GM/DL (ref 5.8–7.6)
RBC # BLD AUTO: 4.11 X10(6)/MCL (ref 4.2–5.4)
RBC MORPH BLD: NORMAL
SODIUM SERPL-SCNC: 130 MMOL/L (ref 136–145)
WBC # SPEC AUTO: 9.4 X10(3)/MCL (ref 4.5–11.5)

## 2024-01-01 PROCEDURE — 63600175 PHARM REV CODE 636 W HCPCS: Performed by: INTERNAL MEDICINE

## 2024-01-01 PROCEDURE — 83735 ASSAY OF MAGNESIUM: CPT | Performed by: NURSE PRACTITIONER

## 2024-01-01 PROCEDURE — 27000221 HC OXYGEN, UP TO 24 HOURS

## 2024-01-01 PROCEDURE — 25000003 PHARM REV CODE 250: Performed by: INTERNAL MEDICINE

## 2024-01-01 PROCEDURE — 80053 COMPREHEN METABOLIC PANEL: CPT | Performed by: NURSE PRACTITIONER

## 2024-01-01 PROCEDURE — 63600175 PHARM REV CODE 636 W HCPCS: Performed by: NURSE PRACTITIONER

## 2024-01-01 PROCEDURE — 21400001 HC TELEMETRY ROOM

## 2024-01-01 PROCEDURE — 25000003 PHARM REV CODE 250: Performed by: NURSE PRACTITIONER

## 2024-01-01 PROCEDURE — 85027 COMPLETE CBC AUTOMATED: CPT | Performed by: NURSE PRACTITIONER

## 2024-01-01 RX ADMIN — GUAIFENESIN 200 MG: 200 SOLUTION ORAL at 08:01

## 2024-01-01 RX ADMIN — ENOXAPARIN SODIUM 60 MG: 80 INJECTION SUBCUTANEOUS at 08:01

## 2024-01-01 RX ADMIN — DULOXETINE HYDROCHLORIDE 60 MG: 30 CAPSULE, DELAYED RELEASE ORAL at 08:01

## 2024-01-01 RX ADMIN — GABAPENTIN 300 MG: 300 CAPSULE ORAL at 08:01

## 2024-01-01 RX ADMIN — GABAPENTIN 300 MG: 300 CAPSULE ORAL at 09:01

## 2024-01-01 RX ADMIN — ENOXAPARIN SODIUM 60 MG: 80 INJECTION SUBCUTANEOUS at 09:01

## 2024-01-01 RX ADMIN — METOPROLOL SUCCINATE 25 MG: 25 TABLET, EXTENDED RELEASE ORAL at 09:01

## 2024-01-01 RX ADMIN — ASPIRIN 81 MG CHEWABLE TABLET 81 MG: 81 TABLET CHEWABLE at 09:01

## 2024-01-01 RX ADMIN — CINACALCET 30 MG: 30 TABLET, FILM COATED ORAL at 09:01

## 2024-01-01 RX ADMIN — AZITHROMYCIN MONOHYDRATE 500 MG: 500 INJECTION, POWDER, LYOPHILIZED, FOR SOLUTION INTRAVENOUS at 04:01

## 2024-01-01 RX ADMIN — LEVOTHYROXINE SODIUM 75 MCG: 25 TABLET ORAL at 09:01

## 2024-01-01 RX ADMIN — ATORVASTATIN CALCIUM 40 MG: 40 TABLET, FILM COATED ORAL at 09:01

## 2024-01-01 RX ADMIN — TRAZODONE HYDROCHLORIDE 100 MG: 100 TABLET ORAL at 08:01

## 2024-01-01 RX ADMIN — FUROSEMIDE 20 MG: 10 INJECTION, SOLUTION INTRAMUSCULAR; INTRAVENOUS at 09:01

## 2024-01-01 RX ADMIN — CEFTRIAXONE 1 G: 1 INJECTION, POWDER, FOR SOLUTION INTRAMUSCULAR; INTRAVENOUS at 02:01

## 2024-01-01 RX ADMIN — DULOXETINE HYDROCHLORIDE 60 MG: 30 CAPSULE, DELAYED RELEASE ORAL at 09:01

## 2024-01-01 RX ADMIN — GUAIFENESIN 200 MG: 200 SOLUTION ORAL at 02:01

## 2024-01-01 RX ADMIN — METOPROLOL SUCCINATE 25 MG: 25 TABLET, EXTENDED RELEASE ORAL at 08:01

## 2024-01-01 RX ADMIN — AMLODIPINE BESYLATE 10 MG: 5 TABLET ORAL at 09:01

## 2024-01-01 RX ADMIN — LOSARTAN POTASSIUM 12.5 MG: 25 TABLET, FILM COATED ORAL at 09:01

## 2024-01-01 NOTE — PROGRESS NOTES
[facility]  Hospital Medicine Progress Note        CHIEF COMPLAINT   Cough (C/o cough worsening since yesterday with pulse ox reading at 86% on room air per Home Health just PTA. Denies SOB or known fever. O2 89% on RA in triage. 95% on 3L NC.)     HISTORY OF PRESENT ILLNESS:   Patient is a very pleasant 89-year-old female with past medical history as below including chronic kidney disease, hypertension, hyperlipidemia, anxiety, fibromyalgia, hypothyroidism who presented to the ER with complaints of worsening cough over the last 48 hours and a low home O2 reading per home health with home sat being 86% on room air.     She arrived to the ER requiring 3 L nasal cannula to maintain adequate sats, but afebrile hemodynamically stable.  Laboratory work showed a mild leukocytosis 14 K, a BNP of 840, a troponin of 1.9.  EKG showed sinus rhythm with RBBB.  Chest x-ray showed right-sided infiltrates.  She was seen by Cardiology in the ER and started on a heparin drip, she was given empiric antibiotics admitted to the hospitalist service for community-acquired pneumonia and NSTEMI.        Patient currently admitted for acute hypoxic respiratory failure due to adenovirus with bacterial coinfection pneumonia complicated by NSTEMI 1 versus 2, on heparin drip per [facility] recommendations      Today's information   Patient examined at bedside, family member at bedside   Patient has no complaints   Vitals reviewed and stable on 4-5 L of oxygen   Leukocytosis has resolved   D-dimer was elevated CTA chest was done and was negative for PE   Blood cultures negative at 24 hours       Exam  GENERAL: awake, alert, oriented and in no acute distress, non-toxic appearing   HEENT: normocephalic atraumatic   NECK: supple   LUNGS:  Bilateral wheezing/rhonchi, no wheezing or rales, no accessory muscle use   CVS: Regular rate and rhythm, normal peripheral perfusion  ABD: Soft, non-tender, non-distended, bowel sounds  present  EXTREMITIES: no clubbing or cyanosis  SKIN: Warm, dry.   NEURO: alert and oriented, grossly without focal deficits   PSYCHIATRIC: Cooperative        ASSESSMENT & PLAN:   Community-acquired pneumonia  Acute hypoxemic respiratory failure 2/2 above   NSTEMI, likely type 2 demand secondary to above   Leukocytosis,resolved   Adenovirus infection  Cough   Atypical chest pain   Hypertension, controlled   Low magnesium levels   Grade 2 diastolic dysfunction        HX: Anxiety, fibromyalgia, HTN, hypothyroidism, hyperlipidemia      Plan  Continue oxygen supplementation to goal SpO2 greater than 94%   Try to wean off oxygen as possible to goal SpO2 greater than 94%   Provide with incentive spirometry   Vitals reviewed and stable on 4-5 L of oxygen   Leukocytosis has resolved   D-dimer was elevated CTA chest was done and was negative for PE   Blood cultures negative at 24 hours   Continue IV antibiotics ceftriaxone and azithromycin, day 2  Add on IV Lasix 20 mg daily for 3 days, day 1 to help with oxygen requirement   Antitussives p.r.n. for cough   Follow-up with [facility]IS recommendations currently to continue heparin drip and plans for possible left heart catheterization on 01/02  resume home medications as appropriate     DVT prophylaxis: heparin gtt   Code status: full         Critical Care diagnoses NSTEMI on heparin drip per protocol   Critical care time greater than 35 minutes          VITAL SIGNS: 24 HRS MIN & MAX LAST   Temp  Min: 97.2 °F (36.2 °C)  Max: 98.4 °F (36.9 °C) 98.4 °F (36.9 °C)   BP  Min: 108/64  Max: 144/68 (!) 115/59   Pulse  Min: 70  Max: 83  70   Resp  Min: 18  Max: 20 18   SpO2  Min: 90 %  Max: 95 % (!) 90 %     I have reviewed the following labs:  Recent Labs   Lab 12/29/23  1216 12/30/23  0727 12/31/23  0351   WBC 14.70* 12.43* 11.21  11.21   RBC 4.64 4.62 4.52   HGB 15.0 14.7 14.2   HCT 43.0 42.6 41.6   MCV 92.7 92.2 92.0   MCH 32.3* 31.8* 31.4*   MCHC 34.9 34.5 34.1   RDW 12.0 12.0 11.8   PLT  187 193 212   MPV 9.2 9.3 9.8     Recent Labs   Lab 12/29/23  1215 12/30/23  0727 12/31/23  0351   * 131* 130*   K 3.6 3.6 3.5   CO2 24 23 26   BUN 18.5 17.2 17.4   CREATININE 0.86 0.70 0.74   CALCIUM 8.1* 7.6* 7.3*   MG  --  1.40* 2.00   ALBUMIN 3.4 2.9* 2.6*   ALKPHOS 52 51 52   ALT 13 13 24   AST 28 32 43*   BILITOT 0.8 0.6 0.5     Microbiology Results (last 7 days)       Procedure Component Value Units Date/Time    Blood Culture [[id number]]  (Normal) Collected: 12/30/23 0012    Order Status: Completed Specimen: Blood, Venous Updated: 12/31/23 0100     CULTURE, BLOOD ([facility]) No Growth At 24 Hours    Blood Culture [[id number]]  (Normal) Collected: 12/30/23 0012    Order Status: Completed Specimen: Blood, Venous Updated: 12/31/23 0100     CULTURE, BLOOD ([facility]) No Growth At 24 Hours    Respiratory Culture [[id number]]     Order Status: Sent Specimen: Sputum, Expectorated              See below for Radiology    Scheduled Med:   amLODIPine  10 mg Oral Daily    aspirin  81 mg Oral Daily    atorvastatin  40 mg Oral Daily    azithromycin  500 mg Intravenous Q24H    cefTRIAXone (ROCEPHIN) IVPB  1 g Intravenous Q24H    cinacalcet  30 mg Oral Daily    DULoxetine  60 mg Oral BID    furosemide (LASIX) injection  20 mg Intravenous Daily    gabapentin  300 mg Oral BID    guaiFENesin 100 mg/5 ml  200 mg Oral Q6H    levothyroxine  75 mcg Oral Daily    losartan  12.5 mg Oral Daily    metoprolol succinate  25 mg Oral BID    traZODone  100 mg Oral QHS      Continuous Infusions:     PRN Meds:  sodium chloride 0.9%, acetaminophen, acetaminophen, acetaminophen-codeine 300-30mg, albuterol-ipratropium, melatonin, sodium chloride 0.9%     Assessment/Plan:      VTE prophylaxis:     Patient condition:  Stable/Fair/Guarded/ Serious/ Critical    Anticipated discharge and Disposition:         All diagnosis and differential diagnosis have been reviewed; assessment and plan has been documented; I have personally reviewed the labs and test  results that are presently available; I have reviewed the patients medication list; I have reviewed the consulting providers response and recommendations. I have reviewed or attempted to review medical records based upon their availability    All of the patient's questions have been  addressed and answered. Patient's is agreeable to the above stated plan. I will continue to monitor closely and make adjustments to medical management as needed.  _____________________________________________________________________    Nutrition Status:    Radiology:  I have personally reviewed the following imaging and agree with the radiologist.     CTA Chest Non-Coronary (PE Studies)  Narrative: EXAMINATION:  CTA CHEST NON CORONARY (PE STUDIES)    CLINICAL HISTORY:  Pulmonary embolism (PE) suspected, positive D-dimer;    TECHNIQUE:  Axial images of the chest were obtained with contrast. Sagittal and coronal reconstructed images were available for review. 3-D MIP reconstructions were performed from source imaging.    Automatic dose control was utilized to reduce patient radiation dose.    DLP= 278    COMPARISON:  No prior imaging available for comparison.    FINDINGS:  PULMONARY ARTERY: No evidence of pulmonary thromboembolism.    AORTA: Normal in course and caliber.    THYROID GLAND: The visualized thyroid is unremarkable.    AIRWAYS: Trachea is midline and tracheobronchial tree is patent.    HEART: The heart is normal in size. There is no pericardial effusion.    LUNGS: The left hemithorax is clear.  Consolidative changes of the right lower lobe with scattered ground-glass nodules throughout the right upper and middle lobe.  Associated right effusion is noted.    LYMPH NODES: Prominent mediastinal lymph nodes are likely reactive.    BONES: No displaced fracture. No aggressive appearing osseous lesion identified.  Irregularity with complete loss of 20 space at T1, T2, T3.    UPPER ABDOMEN:  The visualized abdomen is  unremarkable.  Impression: No evidence of pulmonary thromboembolism.  Findings of right-sided pneumonia.  Recommend continued follow-up.    Degenerative changes of the CS thoracic spine centered around T2.  If pain in this region recommend further evaluation with MRI.    Prominent mediastinal lymph nodes are likely reactive.  Recommend follow-up to resolution with CT in 3-6 months.    Electronically signed by: Nikolai Valle  Date:    12/31/2023  Time:    12:49      Josué Crooks MD   12/31/2023

## 2024-01-01 NOTE — PROGRESS NOTES
"    [facility]    Cardiology  Progress Note    Patient Name: Edwin Boyer  MRN: [id number]  Admission Date: 12/29/2023  Hospital Length of Stay: 3 days  Code Status: Full Code   Attending Provider: Josué Crooks MD   Consulting Provider: BERRY Rivera  Primary Care Physician: Mariely Soto MD  Principal Problem:NSTEMI (non-ST elevated myocardial infarction)    Patient information was obtained from patient, past medical records, and ER records.     Subjective:     Chief Complaint: Reason for Consult: Elevated Troponin      HPI: Ms. Boyer is an 88 y/o female who is known to [facility], Dr. Guidry. The patient presented to [facility] ER on 12.29.23 with c/o a Cough. She reported that about 2 days prior she developed a cough which progressively worsened and her Pulse Oxymetry at Home was 86% on RA. She denied CP, Fever, Chills and SOB. Reported Mild SOB at Rest. Secondary to these findings and symptoms she presented to the ER for evaluation. Upon Evaluation in the ER she was found to have an RA O2 saturation in the Low 80s and began to endorse CP with a Cough. The CP was not presented unless she was coughing. The Pain was 8/10 on a verbal scale with coughing and described as a "sharp pain". She reported that the cough was productive and yielded a yellow/green sputum. In the ER she was found to have a WBC 14.70, Na 130, K 3.6, Cl 95, .2, Troponin 1.936, and Negative Flu A/B and COVID Screenings. She was started on IV Abx and ASA 325mg x 1. [facility] has been consulted for NSTEMI in the Setting of Pneumonia.     12.30.23: NAD. "I am ok." Denies CP and SOB.   12.31.23: NAD. "I am feeling better today." Denies CP and SOB. VSS.  1.1.24: NAD. "I am good." Denies CP, SOB and Palps. VSS. Na 130.    PMH: Anxiety, Carpel Tunnel Syndrome Bilaterally, CKD Stage III, Fibromyalgia, GERD, HTN, HLD, Hypothyroidism, OA, Osteoporosis, Hyperparathyroidism, Urinary Incontinence, VHD (Mod MR)  PSH: Appendectomy, " , Colonoscopy, EMERSON, TKA, Breast Biopsy,   Family History: Father, D, H; Mother, D, HF  Social History: Denies Illicit Drug, ETOH and Tobacco Use    Previous Cardiac Diagnostics:   ECHO 23:  Left Ventricle: The left ventricle is normal in size. Normal wall thickness. Normal wall motion. Mid septal wall is slightly hypokinetic. There is normal systolic function with a visually estimated ejection fraction of 55 - 60%. Grade II diastolic dysfunction.  Right Ventricle: Normal right ventricular cavity size. Systolic function is normal.  Left Atrium: Left atrium is dilated.  Aortic Valve: The aortic valve is a trileaflet valve. There is moderate aortic valve sclerosis. Mildly calcified cusps. There is no stenosis. Aortic valve peak velocity is 1.55 m/s. Mean gradient is 5 mmHg. There is mild aortic regurgitation.  Mitral Valve: Moderately thickened leaflets. There is mild mitral annular calcification present. There is mild stenosis. The mean pressure gradient across the mitral valve is 5 mmHg at a heart rate of 79 bpm. There is mild regurgitation.  Tricuspid Valve: There is no stenosis. There is mild regurgitation.  Pulmonic Valve: There is no stenosis.    ECHO 5.3.23:  The study quality is average.   The left ventricle is normal in size. Global left ventricular systolic function is normal. The left ventricular ejection fraction is 60%. Left ventricular diastolic function is normal. Mild asymmetric septal left ventricular hypertrophy is present.  The left atrial diameter is mildly increased.  Moderate (2+) mitral regurgitation. PISA is 0.54 cm. The MR volume color doppler method is 20 ml. EROA measures 0.1 cm². Vena contracta is 4.5 mm.   Mild (1+) tricuspid regurgitation. Mild (1+) aortic regurgitation. Mild (1+) pulmonic regurgitation.   The estimated pulmonary artery systolic pressure is 39 mmHg assuming a right atrial pressure of 3 mmHg.     Calcium Score 5.18.18:  Total Calcium Score 299    MPI  10.26.11:  This is a normal perfusion study, no perfusion defects noted.   This scan is suggestive of low risk for future cardiovascular events.  The left ventricular cavity is noted to be normal on the stress study. The left ventricular ejection fraction was calculated to be 74% and left ventricular global function is normal.   The study quality is good.    Review of patient's allergies indicates:   Allergen Reactions    Meperidine      Other reaction(s): EXCITATIVE TYPE PSYCHOSIS    Sulfamethoxazole      No current facility-administered medications on file prior to encounter.     Current Outpatient Medications on File Prior to Encounter   Medication Sig    acetaminophen-codeine 300-30mg (TYLENOL #3) 300-30 mg Tab Take 1 tablet by mouth daily as needed (pain).    amLODIPine (NORVASC) 10 MG tablet Take 1 tablet (10 mg total) by mouth once daily.    cinacalcet (SENSIPAR) 30 MG Tab Take 1 tablet (30 mg total) by mouth once daily.    DULoxetine (CYMBALTA) 60 MG capsule Take 1 capsule (60 mg total) by mouth every morning. (Patient taking differently: Take 60 mg by mouth 2 (two) times daily.)    gabapentin (NEURONTIN) 300 MG capsule Take 1 capsule (300 mg total) by mouth 2 (two) times daily.    levothyroxine (SYNTHROID) 75 MCG tablet Take 1 tablet (75 mcg total) by mouth once daily.    losartan (COZAAR) 25 MG tablet Take 0.5 tablets (12.5 mg total) by mouth once daily.    metoprolol succinate (TOPROL-XL) 25 MG 24 hr tablet Take 1 tablet (25 mg total) by mouth 2 (two) times daily.    pravastatin (PRAVACHOL) 20 MG tablet Take 20 mg by mouth once daily.    traZODone (DESYREL) 100 MG tablet Take 1 tablet (100 mg total) by mouth every evening.     Review of Systems   Constitutional:  Positive for fatigue. Negative for chills and fever.   Respiratory:  Negative for shortness of breath.    Cardiovascular:  Negative for chest pain, palpitations and leg swelling.   All other systems reviewed and are negative.    Objective:      Vital Signs (Most Recent):  Temp: 97.7 °F (36.5 °C) (01/01/24 1110)  Pulse: 70 (01/01/24 1110)  Resp: 18 (01/01/24 1110)  BP: 120/63 (01/01/24 1110)  SpO2: (!) 92 % (01/01/24 1110) Vital Signs (24h Range):  Temp:  [97.7 °F (36.5 °C)-98.4 °F (36.9 °C)] 97.7 °F (36.5 °C)  Pulse:  [56-77] 70  Resp:  [18-20] 18  SpO2:  [90 %-93 %] 92 %  BP: (115-138)/(59-80) 120/63     Weight: 64.4 kg (142 lb)  Body mass index is 26.83 kg/m².    SpO2: (!) 92 %         Intake/Output Summary (Last 24 hours) at 1/1/2024 1142  Last data filed at 12/31/2023 1805  Gross per 24 hour   Intake 546.76 ml   Output 700 ml   Net -153.24 ml       Lines/Drains/Airways       Drain  Duration             Female External Urinary Catheter w/ Suction 12/29/23 3 days              Peripheral Intravenous Line  Duration                  Peripheral IV - Single Lumen 12/29/23 1544 20 G Left Antecubital 2 days                  Significant Labs:  Recent Results (from the past 72 hour(s))   COVID/FLU A&B PCR    Collection Time: 12/29/23 11:55 AM   Result Value Ref Range    Influenza A PCR Not Detected Not Detected    Influenza B PCR Not Detected Not Detected    SARS-CoV-2 PCR Not Detected Not Detected, Negative   Comprehensive Metabolic Panel    Collection Time: 12/29/23 12:15 PM   Result Value Ref Range    Sodium Level 130 (L) 136 - 145 mmol/L    Potassium Level 3.6 3.5 - 5.1 mmol/L    Chloride 95 (L) 98 - 107 mmol/L    Carbon Dioxide 24 23 - 31 mmol/L    Glucose Level 141 (H) 82 - 115 mg/dL    Blood Urea Nitrogen 18.5 9.8 - 20.1 mg/dL    Creatinine 0.86 0.55 - 1.02 mg/dL    Calcium Level Total 8.1 (L) 8.4 - 10.2 mg/dL    Protein Total 6.4 5.8 - 7.6 gm/dL    Albumin Level 3.4 3.4 - 4.8 g/dL    Globulin 3.0 2.4 - 3.5 gm/dL    Albumin/Globulin Ratio 1.1 1.1 - 2.0 ratio    Bilirubin Total 0.8 <=1.5 mg/dL    Alkaline Phosphatase 52 40 - 150 unit/L    Alanine Aminotransferase 13 0 - 55 unit/L    Aspartate Aminotransferase 28 5 - 34 unit/L    eGFR >60 mls/min/1.73/m2    Troponin I    Collection Time: 12/29/23 12:15 PM   Result Value Ref Range    Troponin-I 1.936 (H) 0.000 - 0.045 ng/mL   BNP    Collection Time: 12/29/23 12:16 PM   Result Value Ref Range    Natriuretic Peptide 849.2 (H) <=100.0 pg/mL   CBC with Differential    Collection Time: 12/29/23 12:16 PM   Result Value Ref Range    WBC 14.70 (H) 4.50 - 11.50 x10(3)/mcL    RBC 4.64 4.20 - 5.40 x10(6)/mcL    Hgb 15.0 12.0 - 16.0 g/dL    Hct 43.0 37.0 - 47.0 %    MCV 92.7 80.0 - 94.0 fL    MCH 32.3 (H) 27.0 - 31.0 pg    MCHC 34.9 33.0 - 36.0 g/dL    RDW 12.0 11.5 - 17.0 %    Platelet 187 130 - 400 x10(3)/mcL    MPV 9.2 7.4 - 10.4 fL    Neut % 86.9 %    Lymph % 5.6 %    Mono % 6.8 %    Eos % 0.0 %    Basophil % 0.2 %    Lymph # 0.83 0.6 - 4.6 x10(3)/mcL    Neut # 12.76 (H) 2.1 - 9.2 x10(3)/mcL    Mono # 1.00 0.1 - 1.3 x10(3)/mcL    Eos # 0.00 0 - 0.9 x10(3)/mcL    Baso # 0.03 <=0.2 x10(3)/mcL    IG# 0.08 (H) 0 - 0.04 x10(3)/mcL    IG% 0.5 %    NRBC% 0.0 %   Urinalysis, Reflex to Urine Culture    Collection Time: 12/29/23  1:33 PM    Specimen: Urine   Result Value Ref Range    Color, UA Light-Yellow Yellow, Light-Yellow, Colorless, Straw, Dark-Yellow    Appearance, UA Clear Clear    Specific Gravity, UA 1.010 1.005 - 1.030    pH, UA 5.5 5.0 - 8.5    Protein, UA Trace (A) Negative    Glucose, UA Normal Negative, Normal    Ketones, UA Negative Negative    Blood, UA Negative Negative    Bilirubin, UA Negative Negative    Urobilinogen, UA Normal 0.2, 1.0, Normal    Nitrites, UA Negative Negative    Leukocyte Esterase, UA Negative Negative    WBC, UA 0-5 None Seen, 0-2, 3-5, 0-5 /HPF    Bacteria, UA Moderate (A) None Seen, Trace /HPF    Squamous Epithelial Cells, UA Trace None Seen /HPF    RBC, UA 0-5 None Seen, 0-2, 3-5, 0-5 /HPF   Lactic acid, plasma    Collection Time: 12/29/23  3:12 PM   Result Value Ref Range    Lactic Acid Level 1.3 0.5 - 2.2 mmol/L   APTT    Collection Time: 12/29/23  5:16 PM   Result Value Ref Range    PTT  152.8 (HH) 23.2 - 33.7 seconds   Protime-INR    Collection Time: 12/29/23  5:16 PM   Result Value Ref Range    PT 14.8 (H) 12.5 - 14.5 seconds    INR 1.2 <=1.3   Troponin I    Collection Time: 12/29/23  5:16 PM   Result Value Ref Range    Troponin-I 2.442 (H) 0.000 - 0.045 ng/mL   Echo Saline Bubble? No    Collection Time: 12/29/23  5:28 PM   Result Value Ref Range    BSA 1.66 m2    Avtiia's Biplane MOD Ejection Fraction 58 %    LVOT stroke volume 49.68 cm3    LV Systolic Volume 25.60 mL    LV Systolic Volume Index 15.7 mL/m2    LV Diastolic Volume 61.20 mL    LV Diastolic Volume Index 37.55 mL/m2    LVOT diameter 1.70 cm    LVOT area 2.3 cm2    MV Peak E Toby 1.20 m/s    TDI LATERAL 0.06 m/s    TDI SEPTAL 0.07 m/s    E/E' ratio 18.46 m/s    MV Peak A Toby 1.12 m/s    TR Max Toby 2.63 m/s    E/A ratio 1.07     E wave deceleration time 177.00 msec    LV SEPTAL E/E' RATIO 17.14 m/s    LV LATERAL E/E' RATIO 20.00 m/s    LVOT peak toby 1.10 m/s    Left Ventricular Outflow Tract Mean Velocity 0.77 cm/s    Left Ventricular Outflow Tract Mean Gradient 3.00 mmHg    TAPSE 1.98 cm    LA volume (mod) 44.80 cm3    LA Volume Index (Mod) 27.5 mL/m2    RA Major Axis 3.90 cm    RA Width 2.90 cm    AV regurgitation pressure 1/2 time 343 ms    AR Max Toby 3.61 m/s    AV mean gradient 5 mmHg    AV peak gradient 10 mmHg    Ao peak toby 1.55 m/s    Ao VTI 31.90 cm    LVOT peak VTI 21.90 cm    AV valve area 1.56 cm²    AV Velocity Ratio 0.71     AV index (prosthetic) 0.69     TITO by Velocity Ratio 1.61 cm²    MV mean gradient 5 mmHg    MV peak gradient 8 mmHg    MV valve area by continuity eq 1.73 cm2    MV VTI 28.7 cm    Triscuspid Valve Regurgitation Peak Gradient 28 mmHg    Mean e' 0.07 m/s    Mitral Valve Heart Rate 79 bpm   APTT    Collection Time: 12/29/23  6:45 PM   Result Value Ref Range    PTT >200.0 (HH) 23.2 - 33.7 seconds   Troponin I    Collection Time: 12/29/23 11:12 PM   Result Value Ref Range    Troponin-I 2.126 (H) 0.000 -  0.045 ng/mL   APTT    Collection Time: 12/30/23 12:12 AM   Result Value Ref Range    PTT 36.2 (H) 23.2 - 33.7 seconds   Blood Culture    Collection Time: 12/30/23 12:12 AM    Specimen: Blood, Venous   Result Value Ref Range    CULTURE, BLOOD ([facility]) No Growth At 48 Hours    Blood Culture    Collection Time: 12/30/23 12:12 AM    Specimen: Blood, Venous   Result Value Ref Range    CULTURE, BLOOD ([facility]) No Growth At 48 Hours    APTT    Collection Time: 12/30/23 12:45 AM   Result Value Ref Range    PTT 37.3 (H) 23.2 - 33.7 seconds   Respiratory Panel    Collection Time: 12/30/23 12:45 AM   Result Value Ref Range    Adenovirus Detected (A) Not Detected    Coronavirus 229E Not Detected Not Detected    Coronavirus HKU1 Not Detected Not Detected    Coronavirus NL63 Not Detected Not Detected    Coronavirus OC43 PCR, Common Cold Virus Not Detected Not Detected    Human Metapneumovirus Not Detected Not Detected    Parainfluenza Virus 1 Not Detected Not Detected    Parainfluenza Virus 2 Not Detected Not Detected    Parainfluenza Virus 3 Not Detected Not Detected    Parainfluenza Virus 4 Not Detected Not Detected    Bordetella pertussis (ptxP) Not Detected Not Detected    Chlamydia pneumoniae Not Detected Not Detected    Mycoplasma pneumoniae Not Detected Not Detected    Human Rhinovirus/Enterovirus Not Detected Not Detected    Bordetella parapertussis (LM6890) Not Detected Not Detected   MRSA PCR    Collection Time: 12/30/23 12:45 AM   Result Value Ref Range    MRSA PCR SCRN ([facility]) Not Detected Not Detected   APTT    Collection Time: 12/30/23  2:09 AM   Result Value Ref Range    PTT >200.0 (HH) 23.2 - 33.7 seconds   APTT    Collection Time: 12/30/23  3:52 AM   Result Value Ref Range    PTT 48.2 (H) 23.2 - 33.7 seconds   Comprehensive Metabolic Panel    Collection Time: 12/30/23  7:27 AM   Result Value Ref Range    Sodium Level 131 (L) 136 - 145 mmol/L    Potassium Level 3.6 3.5 - 5.1 mmol/L    Chloride 97 (L) 98 - 107 mmol/L     Carbon Dioxide 23 23 - 31 mmol/L    Glucose Level 93 82 - 115 mg/dL    Blood Urea Nitrogen 17.2 9.8 - 20.1 mg/dL    Creatinine 0.70 0.55 - 1.02 mg/dL    Calcium Level Total 7.6 (L) 8.4 - 10.2 mg/dL    Protein Total 5.7 (L) 5.8 - 7.6 gm/dL    Albumin Level 2.9 (L) 3.4 - 4.8 g/dL    Globulin 2.8 2.4 - 3.5 gm/dL    Albumin/Globulin Ratio 1.0 (L) 1.1 - 2.0 ratio    Bilirubin Total 0.6 <=1.5 mg/dL    Alkaline Phosphatase 51 40 - 150 unit/L    Alanine Aminotransferase 13 0 - 55 unit/L    Aspartate Aminotransferase 32 5 - 34 unit/L    eGFR >60 mls/min/1.73/m2   Magnesium    Collection Time: 12/30/23  7:27 AM   Result Value Ref Range    Magnesium Level 1.40 (L) 1.60 - 2.60 mg/dL   Troponin I    Collection Time: 12/30/23  7:27 AM   Result Value Ref Range    Troponin-I     CBC with Differential    Collection Time: 12/30/23  7:27 AM   Result Value Ref Range    WBC 12.43 (H) 4.50 - 11.50 x10(3)/mcL    RBC 4.62 4.20 - 5.40 x10(6)/mcL    Hgb 14.7 12.0 - 16.0 g/dL    Hct 42.6 37.0 - 47.0 %    MCV 92.2 80.0 - 94.0 fL    MCH 31.8 (H) 27.0 - 31.0 pg    MCHC 34.5 33.0 - 36.0 g/dL    RDW 12.0 11.5 - 17.0 %    Platelet 193 130 - 400 x10(3)/mcL    MPV 9.3 7.4 - 10.4 fL    Neut % 84.1 %    Lymph % 9.2 %    Mono % 5.9 %    Eos % 0.0 %    Basophil % 0.2 %    Lymph # 1.14 0.6 - 4.6 x10(3)/mcL    Neut # 10.47 (H) 2.1 - 9.2 x10(3)/mcL    Mono # 0.73 0.1 - 1.3 x10(3)/mcL    Eos # 0.00 0 - 0.9 x10(3)/mcL    Baso # 0.02 <=0.2 x10(3)/mcL    IG# 0.07 (H) 0 - 0.04 x10(3)/mcL    IG% 0.6 %    NRBC% 0.0 %   Troponin I    Collection Time: 12/30/23 11:23 AM   Result Value Ref Range    Troponin-I 1.553 (H) 0.000 - 0.045 ng/mL   APTT    Collection Time: 12/30/23 11:23 AM   Result Value Ref Range    PTT 35.3 (H) 23.2 - 33.7 seconds   APTT    Collection Time: 12/30/23  6:51 PM   Result Value Ref Range    PTT 48.1 (H) 23.2 - 33.7 seconds   Magnesium    Collection Time: 12/31/23  3:51 AM   Result Value Ref Range    Magnesium Level 2.00 1.60 - 2.60 mg/dL    Comprehensive Metabolic Panel    Collection Time: 12/31/23  3:51 AM   Result Value Ref Range    Sodium Level 130 (L) 136 - 145 mmol/L    Potassium Level 3.5 3.5 - 5.1 mmol/L    Chloride 94 (L) 98 - 107 mmol/L    Carbon Dioxide 26 23 - 31 mmol/L    Glucose Level 99 82 - 115 mg/dL    Blood Urea Nitrogen 17.4 9.8 - 20.1 mg/dL    Creatinine 0.74 0.55 - 1.02 mg/dL    Calcium Level Total 7.3 (L) 8.4 - 10.2 mg/dL    Protein Total 5.3 (L) 5.8 - 7.6 gm/dL    Albumin Level 2.6 (L) 3.4 - 4.8 g/dL    Globulin 2.7 2.4 - 3.5 gm/dL    Albumin/Globulin Ratio 1.0 (L) 1.1 - 2.0 ratio    Bilirubin Total 0.5 <=1.5 mg/dL    Alkaline Phosphatase 52 40 - 150 unit/L    Alanine Aminotransferase 24 0 - 55 unit/L    Aspartate Aminotransferase 43 (H) 5 - 34 unit/L    eGFR >60 mls/min/1.73/m2   Lipid Panel    Collection Time: 12/31/23  3:51 AM   Result Value Ref Range    Cholesterol Total 124 <=200 mg/dL    HDL Cholesterol 22 (L) 35 - 60 mg/dL    Triglyceride 155 (H) 37 - 140 mg/dL    Cholesterol/HDL Ratio 6 (H) 0 - 5    Very Low Density Lipoprotein 31     LDL Cholesterol 71.00 50.00 - 140.00 mg/dL   APTT    Collection Time: 12/31/23  3:51 AM   Result Value Ref Range    PTT 50.2 (H) 23.2 - 33.7 seconds   CBC with Differential    Collection Time: 12/31/23  3:51 AM   Result Value Ref Range    WBC 11.21 4.50 - 11.50 x10(3)/mcL    RBC 4.52 4.20 - 5.40 x10(6)/mcL    Hgb 14.2 12.0 - 16.0 g/dL    Hct 41.6 37.0 - 47.0 %    MCV 92.0 80.0 - 94.0 fL    MCH 31.4 (H) 27.0 - 31.0 pg    MCHC 34.1 33.0 - 36.0 g/dL    RDW 11.8 11.5 - 17.0 %    Platelet 212 130 - 400 x10(3)/mcL    MPV 9.8 7.4 - 10.4 fL    NRBC% 0.0 %   Manual Differential    Collection Time: 12/31/23  3:51 AM   Result Value Ref Range    WBC 11.21 x10(3)/mcL    Neutrophils % 87 %    Lymphs % 6 %    Monocytes % 3 %    Plasmacytes % 5 %    Neutrophils Abs 9.7527 (H) 2.1 - 9.2 x10(3)/mcL    Lymphs Abs 0.6726 0.6 - 4.6 x10(3)/mcL    Monocytes Abs 0.3363 0.1 - 1.3 x10(3)/mcL    Platelets Normal  Normal, Adequate    RBC Morph Normal Normal   Path Review, Peripheral Smear    Collection Time: 12/31/23  3:51 AM   Result Value Ref Range    Peripheral Smear Evaluation       Unremarkable granulocytic maturation with absolute neutrophilia, favor reactive, a few monocytes, mature lymphocytes, a few plasmacytoid cells.  Normocytic normochromic red blood cell population with no significant anisocytosis.  Unremarkable platelet morphology.    NOTE: recommend flow cytometry analysis for proper assessment of circulating plasma cells if clinically indicated.    Long Rutherford M.D., Ph.D.        D-Dimer, Quantitative    Collection Time: 12/31/23  3:51 AM   Result Value Ref Range    D-Dimer 3.09 (H) 0.00 - 0.50 ug/mL FEU   Troponin I    Collection Time: 12/31/23  6:58 PM   Result Value Ref Range    Troponin-I 0.979 (H) 0.000 - 0.045 ng/mL   Comprehensive Metabolic Panel    Collection Time: 01/01/24  4:32 AM   Result Value Ref Range    Sodium Level 130 (L) 136 - 145 mmol/L    Potassium Level 3.6 3.5 - 5.1 mmol/L    Chloride 96 (L) 98 - 107 mmol/L    Carbon Dioxide 26 23 - 31 mmol/L    Glucose Level 90 82 - 115 mg/dL    Blood Urea Nitrogen 17.7 9.8 - 20.1 mg/dL    Creatinine 0.72 0.55 - 1.02 mg/dL    Calcium Level Total 7.6 (L) 8.4 - 10.2 mg/dL    Protein Total 5.6 (L) 5.8 - 7.6 gm/dL    Albumin Level 2.4 (L) 3.4 - 4.8 g/dL    Globulin 3.2 2.4 - 3.5 gm/dL    Albumin/Globulin Ratio 0.8 (L) 1.1 - 2.0 ratio    Bilirubin Total 0.4 <=1.5 mg/dL    Alkaline Phosphatase 54 40 - 150 unit/L    Alanine Aminotransferase 29 0 - 55 unit/L    Aspartate Aminotransferase 48 (H) 5 - 34 unit/L    eGFR >60 mls/min/1.73/m2   Magnesium    Collection Time: 01/01/24  4:32 AM   Result Value Ref Range    Magnesium Level 1.80 1.60 - 2.60 mg/dL   CBC with Differential    Collection Time: 01/01/24  4:32 AM   Result Value Ref Range    WBC 9.40 4.50 - 11.50 x10(3)/mcL    RBC 4.11 (L) 4.20 - 5.40 x10(6)/mcL    Hgb 12.9 12.0 - 16.0 g/dL    Hct 38.0 37.0 -  47.0 %    MCV 92.5 80.0 - 94.0 fL    MCH 31.4 (H) 27.0 - 31.0 pg    MCHC 33.9 33.0 - 36.0 g/dL    RDW 11.9 11.5 - 17.0 %    Platelet 199 130 - 400 x10(3)/mcL    MPV 9.6 7.4 - 10.4 fL    NRBC% 0.0 %   Manual Differential    Collection Time: 01/01/24  4:32 AM   Result Value Ref Range    WBC 9.16 x10(3)/mcL    Neutrophils % 77 %    Lymphs % 10 %    Monocytes % 6 %    Eosinophils % 1 %    Basophils % 1 %    Myelocytes % 1 (H) <=0 %    Plasmacytes % 4 %    nRBC % 1 %    Neutrophils Abs 7.0532 2.1 - 9.2 x10(3)/mcL    Lymphs Abs 0.916 0.6 - 4.6 x10(3)/mcL    Monocytes Abs 0.5496 0.1 - 1.3 x10(3)/mcL    Eosinophils Abs 0.0916 0 - 0.9 x10(3)/mcL    Basophils Abs 0.0916 0 - 0.2 x10(3)/mcL    Platelets Normal Normal, Adequate    RBC Morph Normal Normal     Significant Imaging:  Imaging Results              X-Ray Chest 1 View (Final result)  Result time 12/29/23 12:13:33      Final result by Javier Mcneil MD (12/29/23 12:13:33)                   Impression:      Minimal blunting of the right costophrenic angle as above.    Slightly more confluent opacities in the right cardiophrenic angle region early infiltrate can not be completely excluded.    Otherwise no active pulmonary disease and no change as compared with previous exam      Electronically signed by: Javier Mcneil  Date:    12/29/2023  Time:    12:13               Narrative:    EXAMINATION:  XR CHEST 1 VIEW    CPT 30221    CLINICAL HISTORY:  shortness of breath;    COMPARISON:  July 6, 2020    FINDINGS:  Examination reveals mediastinal cardiac silhouette to be within normal limits left lung field is clear and free of gross infiltrates atelectasis or effusions.    Slightly more confluent opacities identified in the right cardiophrenic angle region early infiltrate can not be completely excluded.    There might be minimal blunting of the right costophrenic angle which may indicate the presence of a small right-sided pleural effusion                                     Telemetry: SR     Physical Exam  Constitutional:       General: She is not in acute distress.     Appearance: Normal appearance.   HENT:      Head: Normocephalic.      Mouth/Throat:      Mouth: Mucous membranes are moist.   Eyes:      Extraocular Movements: Extraocular movements intact.      Conjunctiva/sclera: Conjunctivae normal.   Cardiovascular:      Rate and Rhythm: Normal rate and regular rhythm.      Pulses: Normal pulses.      Heart sounds: Murmur (2/6 TU) heard.   Pulmonary:      Effort: Pulmonary effort is normal. No respiratory distress.      Comments: NC O2  Abdominal:      Palpations: Abdomen is soft.   Musculoskeletal:         General: No swelling. Normal range of motion.      Right lower leg: No edema.      Left lower leg: No edema.   Skin:     General: Skin is warm and dry.   Neurological:      General: No focal deficit present.      Mental Status: She is alert and oriented to person, place, and time.   Psychiatric:         Mood and Affect: Mood normal.         Behavior: Behavior normal.         Judgment: Judgment normal.       Home Medications:   No current facility-administered medications on file prior to encounter.     Current Outpatient Medications on File Prior to Encounter   Medication Sig Dispense Refill    acetaminophen-codeine 300-30mg (TYLENOL #3) 300-30 mg Tab Take 1 tablet by mouth daily as needed (pain).      amLODIPine (NORVASC) 10 MG tablet Take 1 tablet (10 mg total) by mouth once daily. 90 tablet 3    cinacalcet (SENSIPAR) 30 MG Tab Take 1 tablet (30 mg total) by mouth once daily. 30 tablet 3    DULoxetine (CYMBALTA) 60 MG capsule Take 1 capsule (60 mg total) by mouth every morning. (Patient taking differently: Take 60 mg by mouth 2 (two) times daily.) 30 capsule 11    gabapentin (NEURONTIN) 300 MG capsule Take 1 capsule (300 mg total) by mouth 2 (two) times daily. 60 capsule 11    levothyroxine (SYNTHROID) 75 MCG tablet Take 1 tablet (75 mcg total) by mouth once daily. 90  tablet 3    losartan (COZAAR) 25 MG tablet Take 0.5 tablets (12.5 mg total) by mouth once daily. 45 tablet 3    metoprolol succinate (TOPROL-XL) 25 MG 24 hr tablet Take 1 tablet (25 mg total) by mouth 2 (two) times daily. 180 tablet 1    pravastatin (PRAVACHOL) 20 MG tablet Take 20 mg by mouth once daily.      traZODone (DESYREL) 100 MG tablet Take 1 tablet (100 mg total) by mouth every evening. 30 tablet 11     Current Inpatient Medications:    Current Facility-Administered Medications:     0.9%  NaCl infusion, , Intravenous, PRN, Brendon Portillo MD, Stopped at 12/30/23 1658    acetaminophen tablet 650 mg, 650 mg, Oral, Q8H PRN, Evert Murillo MD, 650 mg at 12/31/23 2005    acetaminophen tablet 650 mg, 650 mg, Oral, Q8H PRN, Evert Murillo MD    acetaminophen-codeine 300-30mg per tablet 1 tablet, 1 tablet, Oral, Daily PRN, Evert Murillo MD    albuterol-ipratropium 2.5 mg-0.5 mg/3 mL nebulizer solution 3 mL, 3 mL, Nebulization, Q4H PRN, Evert Murillo MD    amLODIPine tablet 10 mg, 10 mg, Oral, Daily, Evert Murillo MD, 10 mg at 01/01/24 0907    aspirin chewable tablet 81 mg, 81 mg, Oral, Daily, Bakari Murillo ANP, 81 mg at 01/01/24 0906    atorvastatin tablet 40 mg, 40 mg, Oral, Daily, Bakari Murillo ANP, 40 mg at 01/01/24 0907    azithromycin 500 mg in dextrose 5 % 250 mL IVPB (ready to mix), 500 mg, Intravenous, Q24H, Evert Murillo MD, Stopped at 12/31/23 1804    cefTRIAXone (ROCEPHIN) 1 g in dextrose 5 % in water (D5W) 100 mL IVPB (MB+), 1 g, Intravenous, Q24H, Evert Murillo MD, Last Rate: 100 mL/hr at 12/31/23 1805, Rate Verify at 12/31/23 1805    cinacalcet tablet 30 mg, 30 mg, Oral, Daily, Evert Murillo MD, 30 mg at 01/01/24 0907    DULoxetine DR capsule 60 mg, 60 mg, Oral, BID, Evert Murillo MD, 60 mg at 01/01/24 0907    enoxaparin injection 60 mg, 1 mg/kg, Subcutaneous, Q12H, Juan Luis Onofre, NP, 60 mg at 01/01/24 0905     furosemide injection 20 mg, 20 mg, Intravenous, Daily, Josué Crooks MD, 20 mg at 01/01/24 0906    gabapentin capsule 300 mg, 300 mg, Oral, BID, Evert Murillo MD, 300 mg at 01/01/24 0907    guaiFENesin 100 mg/5 ml syrup 200 mg, 200 mg, Oral, Q6H, Josué Crooks MD, 200 mg at 12/31/23 1702    levothyroxine tablet 75 mcg, 75 mcg, Oral, Daily, Evert Murillo MD, 75 mcg at 01/01/24 0907    losartan split tablet 12.5 mg, 12.5 mg, Oral, Daily, Evert Murillo MD, 12.5 mg at 01/01/24 0906    melatonin tablet 6 mg, 6 mg, Oral, Nightly PRN, Evert Murillo MD    metoprolol succinate (TOPROL-XL) 24 hr tablet 25 mg, 25 mg, Oral, BID, Evert Murillo MD, 25 mg at 01/01/24 0907    morphine injection 2 mg, 2 mg, Intravenous, Q2H PRN, Jemima, Juan Luis Q., NP    nitroGLYCERIN SL tablet 0.4 mg, 0.4 mg, Sublingual, Q5 Min PRN, JemimaChris galdamezall Q., NP    sodium chloride 0.9% flush 10 mL, 10 mL, Intravenous, PRN, Evert Murillo MD    traZODone tablet 100 mg, 100 mg, Oral, QHS, Evert Murillo MD, 100 mg at 12/31/23 2005    VTE Risk Mitigation (From admission, onward)           Ordered     enoxaparin injection 60 mg  Every 12 hours (non-standard times)         12/31/23 1956     IP VTE HIGH RISK PATIENT  Once         12/30/23 0022     Place sequential compression device  Until discontinued         12/30/23 0022                  Assessment:   NSTEMI - Type Unspecified    - Troponin Peak with Initial Troponin 2.126    - ECHO (12.29.23) - LVEF 55-60%, Normal Wall Motion     - Coronary Calcium Score 5.18.18 - Total 229    - MPI (2011) - Normal Perfusion Study, No Perfusion Defects Noted  CAP  Leukocytosis - Resolved   Cough 2/2 Above  CP 2/2 Above - Atypical   HTN  HLD  Anxiety  GERD  Fibromyalgia  OA  Hypothyroidism  No Hx of GIB    Plan:   Treatment of Pneumonitis per Primary Team  ECHO Reviewed   Continue ASA and Statin  Medical Management Complete with Tx of NSTEMI for 48 Hours  Plan  for Ischemic Workup in AM (Pending Decision per Family: LHC vs Nuclear LexiScan Stress Test)  Keep NPO after MN  Keep K > 4.0 and Mg > 2.0   Will Continue to Follow   Labs in AM: CBC, CMP and Mg    Bakari Murillo ANP  Cardiology  [facility]  01/01/2024    I have seen the patient, reviewed the Nurse Practitioner's note, assessment and plan. I have personally interviewed and examined the patient at bedside and agree with the findings. Medical decision making listed above were done under my guidance.    Physical exam:  Cardiovascular system: regular rhythm, no murmur.  Lungs: CTAB.  Extremities: No leg edema.    Plan:  Family deciding on treatment options

## 2024-01-01 NOTE — PROGRESS NOTES
[facility]  Hospital Medicine Progress Note        CHIEF COMPLAINT   Cough (C/o cough worsening since yesterday with pulse ox reading at 86% on room air per Home Health just PTA. Denies SOB or known fever. O2 89% on RA in triage. 95% on 3L NC.)     HISTORY OF PRESENT ILLNESS:   Patient is a very pleasant 89-year-old female with past medical history as below including chronic kidney disease, hypertension, hyperlipidemia, anxiety, fibromyalgia, hypothyroidism who presented to the ER with complaints of worsening cough over the last 48 hours and a low home O2 reading per home health with home sat being 86% on room air.     She arrived to the ER requiring 3 L nasal cannula to maintain adequate sats, but afebrile hemodynamically stable.  Laboratory work showed a mild leukocytosis 14 K, a BNP of 840, a troponin of 1.9.  EKG showed sinus rhythm with RBBB.  Chest x-ray showed right-sided infiltrates.  She was seen by Cardiology in the ER and started on a heparin drip, she was given empiric antibiotics admitted to the hospitalist service for community-acquired pneumonia and NSTEMI.        Patient currently admitted for acute hypoxic respiratory failure due to adenovirus with bacterial coinfection pneumonia complicated by NSTEMI 1 versus 2, on heparin drip per CIS recommendations      December 31, 2023  Patient examined at bedside, family member at bedside   Patient has no complaints   Vitals reviewed and stable on 4-5 L of oxygen   Leukocytosis has resolved   D-dimer was elevated CTA chest was done and was negative for PE   Blood cultures negative at 24 hours     January 1, 2024--no complaints, no fever, no cough, no shortness for breath, can maintain 93% O2 sat with the 4 L nasal cannula, cardiology recommended left heart catheterization tomorrow       Exam  GENERAL: awake, alert, oriented and in no acute distress, non-toxic appearing   HEENT: normocephalic atraumatic   NECK: supple   LUNGS:   Bilateral wheezing/rhonchi, no wheezing or rales, no accessory muscle use   CVS: Regular rate and rhythm, normal peripheral perfusion  ABD: Soft, non-tender, non-distended, bowel sounds present  EXTREMITIES: no clubbing or cyanosis  SKIN: Warm, dry.   NEURO: alert and oriented, grossly without focal deficits   PSYCHIATRIC: Cooperative        ASSESSMENT & PLAN:   Community-acquired pneumonia  Acute hypoxemic respiratory failure 2/2 above   NSTEMI, likely type 2 demand secondary to above   Leukocytosis,resolved   Adenovirus infection  Cough   Atypical chest pain   Hypertension, controlled   Low magnesium levels   Grade 2 diastolic dysfunction        HX: Anxiety, fibromyalgia, HTN, hypothyroidism, hyperlipidemia      Plan  Continue oxygen supplementation to goal SpO2 greater than 94%   Try to wean off oxygen as possible to goal SpO2 greater than 94%   Provide with incentive spirometry   Vitals reviewed and stable on 4-5 L of oxygen   Leukocytosis has resolved   D-dimer was elevated CTA chest was done and was negative for PE   Blood cultures negative at 48 hours   Continue IV antibiotics ceftriaxone and azithromycin, day 3  Add on IV Lasix 20 mg daily for 3 days to help with oxygen requirement   Antitussives p.r.n. for cough   Follow-up with CIS recommendations currently to continue heparin drip and plans for possible left heart catheterization on 01/02  resume home medications as appropriate  Continue full-dose Lovenox subQ b.i.d. for NSTEMI    Code status: full               VITAL SIGNS: 24 HRS MIN & MAX LAST   Temp  Min: 97.2 °F (36.2 °C)  Max: 98.4 °F (36.9 °C) 98 °F (36.7 °C)   BP  Min: 115/59  Max: 138/80 138/80   Pulse  Min: 56  Max: 83  67   Resp  Min: 18  Max: 20 18   SpO2  Min: 90 %  Max: 95 % (!) 93 %     I have reviewed the following labs:  Recent Labs   Lab 12/30/23  0727 12/31/23  0351 01/01/24  0432   WBC 12.43* 11.21  11.21 9.16  9.40   RBC 4.62 4.52 4.11*   HGB 14.7 14.2 12.9   HCT 42.6 41.6 38.0    MCV 92.2 92.0 92.5   MCH 31.8* 31.4* 31.4*   MCHC 34.5 34.1 33.9   RDW 12.0 11.8 11.9    212 199   MPV 9.3 9.8 9.6       Recent Labs   Lab 12/30/23  0727 12/31/23  0351 01/01/24  0432   * 130* 130*   K 3.6 3.5 3.6   CO2 23 26 26   BUN 17.2 17.4 17.7   CREATININE 0.70 0.74 0.72   CALCIUM 7.6* 7.3* 7.6*   MG 1.40* 2.00 1.80   ALBUMIN 2.9* 2.6* 2.4*   ALKPHOS 51 52 54   ALT 13 24 29   AST 32 43* 48*   BILITOT 0.6 0.5 0.4       Microbiology Results (last 7 days)       Procedure Component Value Units Date/Time    Blood Culture [[id number]]  (Normal) Collected: 12/30/23 0012    Order Status: Completed Specimen: Blood, Venous Updated: 01/01/24 0100     CULTURE, BLOOD ([facility]) No Growth At 48 Hours    Blood Culture [[id number]]  (Normal) Collected: 12/30/23 0012    Order Status: Completed Specimen: Blood, Venous Updated: 01/01/24 0100     CULTURE, BLOOD ([facility]) No Growth At 48 Hours    Respiratory Culture [[id number]]     Order Status: Sent Specimen: Sputum, Expectorated              See below for Radiology    Scheduled Med:   amLODIPine  10 mg Oral Daily    aspirin  81 mg Oral Daily    atorvastatin  40 mg Oral Daily    azithromycin  500 mg Intravenous Q24H    cefTRIAXone (ROCEPHIN) IVPB  1 g Intravenous Q24H    cinacalcet  30 mg Oral Daily    DULoxetine  60 mg Oral BID    enoxaparin  1 mg/kg Subcutaneous Q12H    furosemide (LASIX) injection  20 mg Intravenous Daily    gabapentin  300 mg Oral BID    guaiFENesin 100 mg/5 ml  200 mg Oral Q6H    levothyroxine  75 mcg Oral Daily    losartan  12.5 mg Oral Daily    metoprolol succinate  25 mg Oral BID    traZODone  100 mg Oral QHS      Continuous Infusions:     PRN Meds:  sodium chloride 0.9%, acetaminophen, acetaminophen, acetaminophen-codeine 300-30mg, albuterol-ipratropium, melatonin, morphine, nitroGLYCERIN, sodium chloride 0.9%     Assessment/Plan:      VTE prophylaxis:     Patient condition:  Stable/Fair/Guarded/ Serious/ Critical    Anticipated discharge and  Disposition:         All diagnosis and differential diagnosis have been reviewed; assessment and plan has been documented; I have personally reviewed the labs and test results that are presently available; I have reviewed the patients medication list; I have reviewed the consulting providers response and recommendations. I have reviewed or attempted to review medical records based upon their availability    All of the patient's questions have been  addressed and answered. Patient's is agreeable to the above stated plan. I will continue to monitor closely and make adjustments to medical management as needed.  _____________________________________________________________________    Nutrition Status:    Radiology:  I have personally reviewed the following imaging and agree with the radiologist.     CTA Chest Non-Coronary (PE Studies)  Narrative: EXAMINATION:  CTA CHEST NON CORONARY (PE STUDIES)    CLINICAL HISTORY:  Pulmonary embolism (PE) suspected, positive D-dimer;    TECHNIQUE:  Axial images of the chest were obtained with contrast. Sagittal and coronal reconstructed images were available for review. 3-D MIP reconstructions were performed from source imaging.    Automatic dose control was utilized to reduce patient radiation dose.    DLP= 278    COMPARISON:  No prior imaging available for comparison.    FINDINGS:  PULMONARY ARTERY: No evidence of pulmonary thromboembolism.    AORTA: Normal in course and caliber.    THYROID GLAND: The visualized thyroid is unremarkable.    AIRWAYS: Trachea is midline and tracheobronchial tree is patent.    HEART: The heart is normal in size. There is no pericardial effusion.    LUNGS: The left hemithorax is clear.  Consolidative changes of the right lower lobe with scattered ground-glass nodules throughout the right upper and middle lobe.  Associated right effusion is noted.    LYMPH NODES: Prominent mediastinal lymph nodes are likely reactive.    BONES: No displaced fracture. No  aggressive appearing osseous lesion identified.  Irregularity with complete loss of 20 space at T1, T2, T3.    UPPER ABDOMEN:  The visualized abdomen is unremarkable.  Impression: No evidence of pulmonary thromboembolism.  Findings of right-sided pneumonia.  Recommend continued follow-up.    Degenerative changes of the CS thoracic spine centered around T2.  If pain in this region recommend further evaluation with MRI.    Prominent mediastinal lymph nodes are likely reactive.  Recommend follow-up to resolution with CT in 3-6 months.    Electronically signed by: Nikolai Valle  Date:    12/31/2023  Time:    12:49      Sharon Rizvi MD   01/01/2024

## 2024-01-02 LAB
ALBUMIN SERPL-MCNC: 3 G/DL (ref 3.4–4.8)
ALBUMIN/GLOB SERPL: 0.8 RATIO (ref 1.1–2)
ALP SERPL-CCNC: 69 UNIT/L (ref 40–150)
ALT SERPL-CCNC: 36 UNIT/L (ref 0–55)
AST SERPL-CCNC: 47 UNIT/L (ref 5–34)
BASE EXCESS BLD CALC-SCNC: 9.4 MMOL/L (ref -2–2)
BASOPHILS # BLD AUTO: 0.06 X10(3)/MCL
BASOPHILS NFR BLD AUTO: 0.4 %
BILIRUB SERPL-MCNC: 0.5 MG/DL
BLOOD GAS SAMPLE TYPE (OHS): ABNORMAL
BUN SERPL-MCNC: 17.1 MG/DL (ref 9.8–20.1)
CA-I BLD-SCNC: 0.94 MMOL/L (ref 1.12–1.23)
CALCIUM SERPL-MCNC: 8.4 MG/DL (ref 8.4–10.2)
CHLORIDE SERPL-SCNC: 95 MMOL/L (ref 98–107)
CO2 BLDA-SCNC: 30.5 MMOL/L
CO2 SERPL-SCNC: 24 MMOL/L (ref 23–31)
COHGB MFR BLDA: 0.4 % (ref 0.5–1.5)
CREAT SERPL-MCNC: 0.78 MG/DL (ref 0.55–1.02)
DRAWN BY BLOOD GAS (OHS): ABNORMAL
EOSINOPHIL # BLD AUTO: 0.28 X10(3)/MCL (ref 0–0.9)
EOSINOPHIL NFR BLD AUTO: 1.7 %
ERYTHROCYTE [DISTWIDTH] IN BLOOD BY AUTOMATED COUNT: 11.9 % (ref 11.5–17)
GFR SERPLBLD CREATININE-BSD FMLA CKD-EPI: >60 MLS/MIN/1.73/M2
GLOBULIN SER-MCNC: 4 GM/DL (ref 2.4–3.5)
GLUCOSE SERPL-MCNC: 91 MG/DL (ref 82–115)
HCO3 BLDA-SCNC: 29.7 MMOL/L (ref 22–26)
HCT VFR BLD AUTO: 45.9 % (ref 37–47)
HGB BLD-MCNC: 15.3 G/DL (ref 12–16)
IMM GRANULOCYTES # BLD AUTO: 0.12 X10(3)/MCL (ref 0–0.04)
IMM GRANULOCYTES NFR BLD AUTO: 0.7 %
LPM (OHS): 4
LYMPHOCYTES # BLD AUTO: 2.09 X10(3)/MCL (ref 0.6–4.6)
LYMPHOCYTES NFR BLD AUTO: 12.6 %
MAGNESIUM SERPL-MCNC: 1.8 MG/DL (ref 1.6–2.6)
MCH RBC QN AUTO: 31.2 PG (ref 27–31)
MCHC RBC AUTO-ENTMCNC: 33.3 G/DL (ref 33–36)
MCV RBC AUTO: 93.7 FL (ref 80–94)
METHGB MFR BLDA: 1 % (ref 0.4–1.5)
MONOCYTES # BLD AUTO: 1.13 X10(3)/MCL (ref 0.1–1.3)
MONOCYTES NFR BLD AUTO: 6.8 %
NEUTROPHILS # BLD AUTO: 12.89 X10(3)/MCL (ref 2.1–9.2)
NEUTROPHILS NFR BLD AUTO: 77.8 %
NRBC BLD AUTO-RTO: 0 %
NUC REST EJECTION FRACTION: 64
O2 HB BLOOD GAS (OHS): 96.2 % (ref 94–97)
OHS CV CPX 85 PERCENT MAX PREDICTED HEART RATE MALE: 111
OHS CV CPX MAX PREDICTED HEART RATE: 131
OHS CV CPX PATIENT IS FEMALE: 1
OHS CV CPX PATIENT IS MALE: 0
OXYGEN DEVICE BLOOD GAS (OHS): ABNORMAL
OXYHGB MFR BLDA: 12.8 G/DL (ref 12–16)
PCO2 BLDA: 27 MMHG (ref 35–45)
PH BLDA: 7.65 [PH] (ref 7.35–7.45)
PLATELET # BLD AUTO: 295 X10(3)/MCL (ref 130–400)
PMV BLD AUTO: 10 FL (ref 7.4–10.4)
PO2 BLDA: 129 MMHG (ref 80–100)
POTASSIUM BLOOD GAS (OHS): 3.9 MMOL/L (ref 3.5–5)
POTASSIUM SERPL-SCNC: 3.9 MMOL/L (ref 3.5–5.1)
PROT SERPL-MCNC: 7 GM/DL (ref 5.8–7.6)
RBC # BLD AUTO: 4.9 X10(6)/MCL (ref 4.2–5.4)
SAMPLE SITE BLOOD GAS (OHS): ABNORMAL
SAO2 % BLDA: 99.4 %
SODIUM BLOOD GAS (OHS): 122 MMOL/L (ref 137–145)
SODIUM SERPL-SCNC: 134 MMOL/L (ref 136–145)
WBC # SPEC AUTO: 16.57 X10(3)/MCL (ref 4.5–11.5)

## 2024-01-02 PROCEDURE — 97530 THERAPEUTIC ACTIVITIES: CPT

## 2024-01-02 PROCEDURE — 25000003 PHARM REV CODE 250: Performed by: INTERNAL MEDICINE

## 2024-01-02 PROCEDURE — 21400001 HC TELEMETRY ROOM

## 2024-01-02 PROCEDURE — 83735 ASSAY OF MAGNESIUM: CPT | Performed by: NURSE PRACTITIONER

## 2024-01-02 PROCEDURE — 25000003 PHARM REV CODE 250: Performed by: NURSE PRACTITIONER

## 2024-01-02 PROCEDURE — 27000221 HC OXYGEN, UP TO 24 HOURS

## 2024-01-02 PROCEDURE — 63600175 PHARM REV CODE 636 W HCPCS: Performed by: NURSE PRACTITIONER

## 2024-01-02 PROCEDURE — 63600175 PHARM REV CODE 636 W HCPCS: Performed by: INTERNAL MEDICINE

## 2024-01-02 PROCEDURE — 36600 WITHDRAWAL OF ARTERIAL BLOOD: CPT

## 2024-01-02 PROCEDURE — 97162 PT EVAL MOD COMPLEX 30 MIN: CPT

## 2024-01-02 PROCEDURE — 99900035 HC TECH TIME PER 15 MIN (STAT)

## 2024-01-02 PROCEDURE — 85025 COMPLETE CBC W/AUTO DIFF WBC: CPT | Performed by: INTERNAL MEDICINE

## 2024-01-02 PROCEDURE — 80053 COMPREHEN METABOLIC PANEL: CPT | Performed by: NURSE PRACTITIONER

## 2024-01-02 RX ADMIN — FUROSEMIDE 20 MG: 10 INJECTION, SOLUTION INTRAMUSCULAR; INTRAVENOUS at 09:01

## 2024-01-02 RX ADMIN — GUAIFENESIN 200 MG: 200 SOLUTION ORAL at 05:01

## 2024-01-02 RX ADMIN — CEFTRIAXONE 1 G: 1 INJECTION, POWDER, FOR SOLUTION INTRAMUSCULAR; INTRAVENOUS at 02:01

## 2024-01-02 RX ADMIN — ENOXAPARIN SODIUM 60 MG: 80 INJECTION SUBCUTANEOUS at 07:01

## 2024-01-02 RX ADMIN — GABAPENTIN 300 MG: 300 CAPSULE ORAL at 07:01

## 2024-01-02 RX ADMIN — METOPROLOL SUCCINATE 25 MG: 25 TABLET, EXTENDED RELEASE ORAL at 07:01

## 2024-01-02 RX ADMIN — ASPIRIN 81 MG CHEWABLE TABLET 81 MG: 81 TABLET CHEWABLE at 09:01

## 2024-01-02 RX ADMIN — ACETAMINOPHEN 650 MG: 325 TABLET, FILM COATED ORAL at 07:01

## 2024-01-02 RX ADMIN — LEVOTHYROXINE SODIUM 75 MCG: 25 TABLET ORAL at 09:01

## 2024-01-02 RX ADMIN — DULOXETINE HYDROCHLORIDE 60 MG: 30 CAPSULE, DELAYED RELEASE ORAL at 09:01

## 2024-01-02 RX ADMIN — ATORVASTATIN CALCIUM 40 MG: 40 TABLET, FILM COATED ORAL at 09:01

## 2024-01-02 RX ADMIN — ENOXAPARIN SODIUM 60 MG: 80 INJECTION SUBCUTANEOUS at 09:01

## 2024-01-02 RX ADMIN — AMLODIPINE BESYLATE 10 MG: 5 TABLET ORAL at 09:01

## 2024-01-02 RX ADMIN — AZITHROMYCIN MONOHYDRATE 500 MG: 500 INJECTION, POWDER, LYOPHILIZED, FOR SOLUTION INTRAVENOUS at 05:01

## 2024-01-02 RX ADMIN — GUAIFENESIN 200 MG: 200 SOLUTION ORAL at 01:01

## 2024-01-02 RX ADMIN — TRAZODONE HYDROCHLORIDE 100 MG: 100 TABLET ORAL at 07:01

## 2024-01-02 RX ADMIN — DULOXETINE HYDROCHLORIDE 60 MG: 30 CAPSULE, DELAYED RELEASE ORAL at 07:01

## 2024-01-02 RX ADMIN — CINACALCET 30 MG: 30 TABLET, FILM COATED ORAL at 09:01

## 2024-01-02 RX ADMIN — LOSARTAN POTASSIUM 12.5 MG: 25 TABLET, FILM COATED ORAL at 09:01

## 2024-01-02 RX ADMIN — GUAIFENESIN 200 MG: 200 SOLUTION ORAL at 04:01

## 2024-01-02 NOTE — PLAN OF CARE
Problem: Physical Therapy  Goal: Physical Therapy Goal  Description: Goals to be met by: 2024     Patient will increase functional independence with mobility by performin. Supine to sit with Stand-by Assistance  2. Sit to stand transfer with Stand-by Assistance  3. Bed to chair transfer with Stand-by Assistance using Rolling Walker  4. Gait  x 150 feet with Stand-by Assistance using Rolling Walker.     Outcome: Ongoing, Progressing

## 2024-01-02 NOTE — PT/OT/SLP EVAL
Physical Therapy Evaluation    Patient Name:  Edwin Boyer   MRN:  [id number]    Recommendations:     Discharge therapy intensity: Moderate Intensity Therapy   Discharge Equipment Recommendations: walker, rolling, to be determined by next level of care   Barriers to discharge: Decreased caregiver support, Impaired mobility, and Ongoing medical needs    Assessment:     Edwin Boyer is a 89 y.o. female admitted with a medical diagnosis of NSTEMI (non-ST elevated myocardial infarction). She presented to ER with ongoing cough and hypoxia on RA. Pt with community acquired pneumonia, acute hypoxic respiratory failure, leukocytosis, adenovirus infection with past medical history significant for anxiety, fibromyalgia, HTN, hypothyroidism, hyperlipidemia. She presents with the following impairments/functional limitations: weakness, impaired endurance, impaired self care skills, impaired functional mobility, gait instability, impaired balance, impaired cardiopulmonary response to activity. Pt provides good effort with mobility but is easily fatigued with exertion. She desaturates with mobility to <90% on 4L NC and HR quickly increases to >100bpm with exertion. PT to follow during acute admission and update discharge recommendations as appropriate.    Rehab Prognosis: Good; patient would benefit from acute skilled PT services to address these deficits and reach maximum level of function.    Recent Surgery: * No surgery found *      Plan:     During this hospitalization, patient to be seen 5 x/week to address the identified rehab impairments via gait training, therapeutic activities, therapeutic exercises, neuromuscular re-education and progress toward the following goals:    Plan of Care Expires:  02/02/24    Subjective     Chief Complaint: fatigue, incontinence  Patient/Family Comments/goals: family hopeful for rehab after hospital stay  Pain/Comfort:  Pain Rating 1: 0/10  Pain Rating Post-Intervention 1:  0/10    Patients cultural, spiritual, Congregational conflicts given the current situation: no    Living Environment:  Pt lives at home alone. She has steps to enter the home and uses rollator for mobility. Pt did not require supplemental O2 at baseline.  Prior to admission, patients level of function was mod I.  Equipment used at home: rollator.  DME owned (not currently used): none.  Upon discharge, patient will have assistance from family.    Objective:     Communicated with nurse prior to session.  Patient found HOB elevated with oxygen, PureWick, peripheral IV, pulse ox (continuous), telemetry  upon PT entry to room.    General Precautions: Standard, fall  Orthopedic Precautions:N/A   Braces: N/A  Respiratory Status: Nasal cannula, flow 4 L/min      Exams:  Cognitive Exam:  Patient is oriented to Person and Place  Gross Motor Coordination:  WFL  RLE ROM: WFL  RLE Strength: WFL  LLE ROM: WFL  LLE Strength: WFL  Skin integrity: Visible skin intact      Functional Mobility:  Bed Mobility:     Rolling Right: minimum assistance  Supine to Sit: minimum assistance  Transfers:     Sit to Stand:  minimum assistance with rolling walker  Bed to Chair: minimum assistance with  rolling walker  using  Step Transfer  Toilet Transfer: minimum assistance with  rolling walker  using  Step Transfer  Gait: 15 ft + 10ft with RW min A. Flexed posture with step through gait pattern, verbal cues for proper use of AD and hand placement for safety  Balance: static sitting CGA      AM-PAC 6 CLICK MOBILITY  Total Score:17       Treatment & Education:  Multiple trials to stand from EOB, pt with ongoing void with each attempt to stand. Donned brief and ambulated to commode with min A. Pt with (+) void on commode, assist required for pericare. Gown change x 2 during session. Upon settling in chair, transport arrived for pt. Assist with transfer to chair, stand step transfer with HHA.     Patient and family were provided with verbal education  education regarding PT role/goals/POC, fall prevention, safety awareness, and discharge/DME recommendations.  Understanding was verbalized, however additional teaching warranted.     Patient left  with transport services in  with family following  with  nurse notified.    GOALS:   Multidisciplinary Problems       Physical Therapy Goals          Problem: Physical Therapy    Goal Priority Disciplines Outcome Goal Variances Interventions   Physical Therapy Goal     PT, PT/OT Ongoing, Progressing     Description: Goals to be met by: 2024     Patient will increase functional independence with mobility by performin. Supine to sit with Stand-by Assistance  2. Sit to stand transfer with Stand-by Assistance  3. Bed to chair transfer with Stand-by Assistance using Rolling Walker  4. Gait  x 150 feet with Stand-by Assistance using Rolling Walker.                          History:     Past Medical History:   Diagnosis Date    Anxiety 2022    Carpal tunnel syndrome, bilateral     Chronic back pain     CKD (chronic kidney disease) stage 3, GFR 30-59 ml/min 2022    Fibromyalgia     GERD with esophagitis 2022    HTN (hypertension), benign 2022    Hyperlipidemia 2022    Hypothyroidism 2022    Insomnia 2022    OA (osteoarthritis) 2022    Osteoporosis     Primary hyperparathyroidism 2023    Urinary incontinence 2022       Past Surgical History:   Procedure Laterality Date    APPENDECTOMY       SECTION      COLONOSCOPY      HYSTERECTOMY      TOTAL KNEE ARTHROPLASTY         Time Tracking:     PT Received On: 24  PT Start Time: 1053     PT Stop Time: 1128  PT Total Time (min): 35 min     Billable Minutes: Evaluation moderate and Therapeutic Activity 15 min      2024

## 2024-01-02 NOTE — PROGRESS NOTES
[facility]  Hospital Medicine Progress Note        CHIEF COMPLAINT   Cough (C/o cough worsening since yesterday with pulse ox reading at 86% on room air per Home Health just PTA. Denies SOB or known fever. O2 89% on RA in triage. 95% on 3L NC.)     HISTORY OF PRESENT ILLNESS:   Patient is a very pleasant 89-year-old female with past medical history as below including chronic kidney disease, hypertension, hyperlipidemia, anxiety, fibromyalgia, hypothyroidism who presented to the ER with complaints of worsening cough over the last 48 hours and a low home O2 reading per home health with home sat being 86% on room air.     She arrived to the ER requiring 3 L nasal cannula to maintain adequate sats, but afebrile hemodynamically stable.  Laboratory work showed a mild leukocytosis 14 K, a BNP of 840, a troponin of 1.9.  EKG showed sinus rhythm with RBBB.  Chest x-ray showed right-sided infiltrates.  She was seen by Cardiology in the ER and started on a heparin drip, she was given empiric antibiotics admitted to the hospitalist service for community-acquired pneumonia and NSTEMI.        Patient currently admitted for acute hypoxic respiratory failure due to adenovirus with bacterial coinfection pneumonia complicated by NSTEMI 1 versus 2, on heparin drip per [facility] recommendations , plans for left heart catheterization     Today's information   Patient examined at bedside, no family member at bedside   Patient continues to require 4 L of oxygen via nasal cannula despite antibiotics and IV Lasix   CTA chest was done with no pulmonary embolism   White cell count elevated at 16.5 ???  Not on steroids   Blood cultures negative at 72 hours   Patient was currently NPO for possible left heart catheterization with Cardiology if family members decide     Exam  GENERAL: awake, alert, oriented and in no acute distress, non-toxic appearing   HEENT: normocephalic atraumatic   NECK: supple   LUNGS:  Bilateral  wheezing/rhonchi, no wheezing or rales, no accessory muscle use   CVS: Regular rate and rhythm, normal peripheral perfusion  ABD: Soft, non-tender, non-distended, bowel sounds present  EXTREMITIES: no clubbing or cyanosis  SKIN: Warm, dry.   NEURO: alert and oriented, grossly without focal deficits   PSYCHIATRIC: Cooperative        ASSESSMENT & PLAN:   Community-acquired pneumonia  Acute hypoxemic respiratory failure 2/2 above   NSTEMI, likely type 2 demand secondary to above   Leukocytosis , ???  Adenovirus infection  Cough   Atypical chest pain   Hypertension, controlled   Low magnesium levels   Grade 2 diastolic dysfunction        HX: Anxiety, fibromyalgia, HTN, hypothyroidism, hyperlipidemia      Plan  Patient continues to require 4 L of oxygen via nasal cannula despite antibiotics and IV Lasix   CTA chest was done with no pulmonary embolism   White cell count elevated at 16.5 ???  Not on steroids , unclear why it was trending up   WILL CONSULT PULMONOLOGY for further input given persistent hypoxemia and leukocytosis  Blood cultures negative at 72 hours   Patient was currently NPO for possible left heart catheterization with Cardiology if family members decide  Continue oxygen supplementation to goal SpO2 greater than 94%   Provide with incentive spirometry   D-dimer was elevated CTA chest was done and was negative for PE   Continue IV antibiotics ceftriaxone and azithromycin, day 4/7  Add on IV Lasix 20 mg daily for 3 days, day 3/3  to help with oxygen requirement   Antitussives p.r.n. for cough        DVT prophylaxis: sc lovenox    Code status: full            VITAL SIGNS: 24 HRS MIN & MAX LAST   Temp  Min: 97 °F (36.1 °C)  Max: 97.9 °F (36.6 °C) 97 °F (36.1 °C)   BP  Min: 119/70  Max: 139/68 133/81   Pulse  Min: 69  Max: 78  69   Resp  Min: 18  Max: 18 18   SpO2  Min: 95 %  Max: 98 % 98 %     I have reviewed the following labs:  Recent Labs   Lab 12/31/23  0351 01/01/24  0432 01/02/24  0325   WBC 11.21   11.21 9.16  9.40 16.57*   RBC 4.52 4.11* 4.90   HGB 14.2 12.9 15.3   HCT 41.6 38.0 45.9   MCV 92.0 92.5 93.7   MCH 31.4* 31.4* 31.2*   MCHC 34.1 33.9 33.3   RDW 11.8 11.9 11.9    199 295   MPV 9.8 9.6 10.0     Recent Labs   Lab 12/31/23  0351 01/01/24  0432 01/02/24  0325   * 130* 134*   K 3.5 3.6 3.9   CO2 26 26 24   BUN 17.4 17.7 17.1   CREATININE 0.74 0.72 0.78   CALCIUM 7.3* 7.6* 8.4   MG 2.00 1.80 1.80   ALBUMIN 2.6* 2.4* 3.0*   ALKPHOS 52 54 69   ALT 24 29 36   AST 43* 48* 47*   BILITOT 0.5 0.4 0.5     Microbiology Results (last 7 days)       Procedure Component Value Units Date/Time    Blood Culture [[id number]]  (Normal) Collected: 12/30/23 0012    Order Status: Completed Specimen: Blood, Venous Updated: 01/02/24 0100     CULTURE, BLOOD ([facility]) No Growth At 72 Hours    Blood Culture [[id number]]  (Normal) Collected: 12/30/23 0012    Order Status: Completed Specimen: Blood, Venous Updated: 01/02/24 0100     CULTURE, BLOOD ([facility]) No Growth At 72 Hours    Respiratory Culture [[id number]]     Order Status: Sent Specimen: Sputum, Expectorated              See below for Radiology    Scheduled Med:   amLODIPine  10 mg Oral Daily    aspirin  81 mg Oral Daily    atorvastatin  40 mg Oral Daily    azithromycin  500 mg Intravenous Q24H    cefTRIAXone (ROCEPHIN) IVPB  1 g Intravenous Q24H    cinacalcet  30 mg Oral Daily    DULoxetine  60 mg Oral BID    enoxaparin  1 mg/kg Subcutaneous Q12H    gabapentin  300 mg Oral BID    guaiFENesin 100 mg/5 ml  200 mg Oral Q6H    levothyroxine  75 mcg Oral Daily    losartan  12.5 mg Oral Daily    metoprolol succinate  25 mg Oral BID    traZODone  100 mg Oral QHS      Continuous Infusions:     PRN Meds:  sodium chloride 0.9%, acetaminophen, acetaminophen, acetaminophen-codeine 300-30mg, albuterol-ipratropium, melatonin, morphine, nitroGLYCERIN, sodium chloride 0.9%     Assessment/Plan:      VTE prophylaxis:     Patient condition:  Stable/Fair/Guarded/ Serious/  Critical    Anticipated discharge and Disposition:         All diagnosis and differential diagnosis have been reviewed; assessment and plan has been documented; I have personally reviewed the labs and test results that are presently available; I have reviewed the patients medication list; I have reviewed the consulting providers response and recommendations. I have reviewed or attempted to review medical records based upon their availability    All of the patient's questions have been  addressed and answered. Patient's is agreeable to the above stated plan. I will continue to monitor closely and make adjustments to medical management as needed.  _____________________________________________________________________    Nutrition Status:    Radiology:  I have personally reviewed the following imaging and agree with the radiologist.     CTA Chest Non-Coronary (PE Studies)  Narrative: EXAMINATION:  CTA CHEST NON CORONARY (PE STUDIES)    CLINICAL HISTORY:  Pulmonary embolism (PE) suspected, positive D-dimer;    TECHNIQUE:  Axial images of the chest were obtained with contrast. Sagittal and coronal reconstructed images were available for review. 3-D MIP reconstructions were performed from source imaging.    Automatic dose control was utilized to reduce patient radiation dose.    DLP= 278    COMPARISON:  No prior imaging available for comparison.    FINDINGS:  PULMONARY ARTERY: No evidence of pulmonary thromboembolism.    AORTA: Normal in course and caliber.    THYROID GLAND: The visualized thyroid is unremarkable.    AIRWAYS: Trachea is midline and tracheobronchial tree is patent.    HEART: The heart is normal in size. There is no pericardial effusion.    LUNGS: The left hemithorax is clear.  Consolidative changes of the right lower lobe with scattered ground-glass nodules throughout the right upper and middle lobe.  Associated right effusion is noted.    LYMPH NODES: Prominent mediastinal lymph nodes are likely  reactive.    BONES: No displaced fracture. No aggressive appearing osseous lesion identified.  Irregularity with complete loss of 20 space at T1, T2, T3.    UPPER ABDOMEN:  The visualized abdomen is unremarkable.  Impression: No evidence of pulmonary thromboembolism.  Findings of right-sided pneumonia.  Recommend continued follow-up.    Degenerative changes of the CS thoracic spine centered around T2.  If pain in this region recommend further evaluation with MRI.    Prominent mediastinal lymph nodes are likely reactive.  Recommend follow-up to resolution with CT in 3-6 months.    Electronically signed by: Nikolai Valle  Date:    12/31/2023  Time:    12:49      Josué Crooks MD   01/02/2024

## 2024-01-02 NOTE — PROGRESS NOTES
" [facility]    Cardiology  Progress Note    Patient Name: Edwin Boyer  MRN: [id number]  Admission Date: 12/29/2023  Hospital Length of Stay: 4 days  Code Status: Full Code   Attending Provider: Josué Crooks MD   Consulting Provider: Heaven Hayward NP  Primary Care Physician: Mariely Soto MD  Principal Problem:NSTEMI (non-ST elevated myocardial infarction)    Patient information was obtained from patient, past medical records, and ER records.     Subjective:     Reason for Consult: Elevated Troponin      HPI: Ms. Boyer is an 89 y/o female who is known to [place], Dr. Guidry. The patient presented to [facility] ER on 12.29.23 with c/o a Cough. She reported that about 2 days prior she developed a cough which progressively worsened and her Pulse Oxymetry at Home was 86% on RA. She denied CP, Fever, Chills and SOB. Reported Mild SOB at Rest. Secondary to these findings and symptoms she presented to the ER for evaluation. Upon Evaluation in the ER she was found to have an RA O2 saturation in the Low 80s and began to endorse CP with a Cough. The CP was not presented unless she was coughing. The Pain was 8/10 on a verbal scale with coughing and described as a "sharp pain". She reported that the cough was productive and yielded a yellow/green sputum. In the ER she was found to have a WBC 14.70, Na 130, K 3.6, Cl 95, .2, Troponin 1.936, and Negative Flu A/B and COVID Screenings. She was started on IV Abx and ASA 325mg x 1. [facility] has been consulted for NSTEMI in the Setting of Pneumonia.     Hospital Course:   12.30.23: NAD. "I am ok." Denies CP and SOB.   12.31.23: NAD. "I am feeling better today." Denies CP and SOB. VSS.  1.1.24: NAD. "I am good." Denies CP, SOB and Palps. VSS. Na 130.  1.2.24: NAD, VSS. Family wishes to proceed with Stress test. She denies SOB or CP    PMH: Anxiety, Carpel Tunnel Syndrome Bilaterally, CKD Stage III, Fibromyalgia, GERD, HTN, HLD, Hypothyroidism, OA, Osteoporosis, " Hyperparathyroidism, Urinary Incontinence, VHD (Mod MR)  PSH: Appendectomy, , Colonoscopy, [place], TKA, Breast Biopsy,   Family History: Father, D, H; Mother, D, HF  Social History: Denies Illicit Drug, ETOH and Tobacco Use    Previous Cardiac Diagnostics:   ECHO 23:  Left Ventricle: The left ventricle is normal in size. Normal wall thickness. Normal wall motion. Mid septal wall is slightly hypokinetic. There is normal systolic function with a visually estimated ejection fraction of 55 - 60%. Grade II diastolic dysfunction.  Right Ventricle: Normal right ventricular cavity size. Systolic function is normal.  Left Atrium: Left atrium is dilated.  Aortic Valve: The aortic valve is a trileaflet valve. There is moderate aortic valve sclerosis. Mildly calcified cusps. There is no stenosis. Aortic valve peak velocity is 1.55 m/s. Mean gradient is 5 mmHg. There is mild aortic regurgitation.  Mitral Valve: Moderately thickened leaflets. There is mild mitral annular calcification present. There is mild stenosis. The mean pressure gradient across the mitral valve is 5 mmHg at a heart rate of 79 bpm. There is mild regurgitation.  Tricuspid Valve: There is no stenosis. There is mild regurgitation.  Pulmonic Valve: There is no stenosis.    ECHO 5.3.23:  The study quality is average.   The left ventricle is normal in size. Global left ventricular systolic function is normal. The left ventricular ejection fraction is 60%. Left ventricular diastolic function is normal. Mild asymmetric septal left ventricular hypertrophy is present.  The left atrial diameter is mildly increased.  Moderate (2+) mitral regurgitation. PISA is 0.54 cm. The MR volume color doppler method is 20 ml. EROA measures 0.1 cm². Vena contracta is 4.5 mm.   Mild (1+) tricuspid regurgitation. Mild (1+) aortic regurgitation. Mild (1+) pulmonic regurgitation.   The estimated pulmonary artery systolic pressure is 39 mmHg assuming a right atrial pressure of  3 mmHg.     Calcium Score 5.18.18:  Total Calcium Score 299    MPI 10.26.11:  This is a normal perfusion study, no perfusion defects noted.   This scan is suggestive of low risk for future cardiovascular events.  The left ventricular cavity is noted to be normal on the stress study. The left ventricular ejection fraction was calculated to be 74% and left ventricular global function is normal.   The study quality is good.    Review of patient's allergies indicates:   Allergen Reactions    Meperidine      Other reaction(s): EXCITATIVE TYPE PSYCHOSIS    Sulfamethoxazole      No current facility-administered medications on file prior to encounter.     Current Outpatient Medications on File Prior to Encounter   Medication Sig    acetaminophen-codeine 300-30mg (TYLENOL #3) 300-30 mg Tab Take 1 tablet by mouth daily as needed (pain).    amLODIPine (NORVASC) 10 MG tablet Take 1 tablet (10 mg total) by mouth once daily.    cinacalcet (SENSIPAR) 30 MG Tab Take 1 tablet (30 mg total) by mouth once daily.    DULoxetine (CYMBALTA) 60 MG capsule Take 1 capsule (60 mg total) by mouth every morning. (Patient taking differently: Take 60 mg by mouth 2 (two) times daily.)    gabapentin (NEURONTIN) 300 MG capsule Take 1 capsule (300 mg total) by mouth 2 (two) times daily.    levothyroxine (SYNTHROID) 75 MCG tablet Take 1 tablet (75 mcg total) by mouth once daily.    losartan (COZAAR) 25 MG tablet Take 0.5 tablets (12.5 mg total) by mouth once daily.    metoprolol succinate (TOPROL-XL) 25 MG 24 hr tablet Take 1 tablet (25 mg total) by mouth 2 (two) times daily.    pravastatin (PRAVACHOL) 20 MG tablet Take 20 mg by mouth once daily.    traZODone (DESYREL) 100 MG tablet Take 1 tablet (100 mg total) by mouth every evening.     Review of Systems   Constitutional:  Negative for chills and fever.   Respiratory:  Negative for shortness of breath.    Cardiovascular:  Negative for chest pain, palpitations and leg swelling.   All other systems  reviewed and are negative.    Objective:     Vital Signs (Most Recent):  Temp: 97 °F (36.1 °C) (01/02/24 0737)  Pulse: 69 (01/02/24 0737)  Resp: 18 (01/02/24 0418)  BP: 133/81 (01/02/24 0737)  SpO2: 98 % (01/02/24 0737) Vital Signs (24h Range):  Temp:  [97 °F (36.1 °C)-97.9 °F (36.6 °C)] 97 °F (36.1 °C)  Pulse:  [67-78] 69  Resp:  [18] 18  SpO2:  [92 %-98 %] 98 %  BP: (119-139)/(63-81) 133/81     Weight: 64.4 kg (142 lb)  Body mass index is 26.83 kg/m².    SpO2: 98 %         Intake/Output Summary (Last 24 hours) at 1/2/2024 0852  Last data filed at 1/2/2024 0644  Gross per 24 hour   Intake 240 ml   Output 1500 ml   Net -1260 ml       Lines/Drains/Airways       Drain  Duration             Female External Urinary Catheter w/ Suction 12/29/23 4 days              Peripheral Intravenous Line  Duration                  Peripheral IV - Single Lumen 12/29/23 1544 20 G Left Antecubital 3 days                  Significant Labs:  Recent Results (from the past 72 hour(s))   Troponin I    Collection Time: 12/30/23 11:23 AM   Result Value Ref Range    Troponin-I 1.553 (H) 0.000 - 0.045 ng/mL   APTT    Collection Time: 12/30/23 11:23 AM   Result Value Ref Range    PTT 35.3 (H) 23.2 - 33.7 seconds   APTT    Collection Time: 12/30/23  6:51 PM   Result Value Ref Range    PTT 48.1 (H) 23.2 - 33.7 seconds   Magnesium    Collection Time: 12/31/23  3:51 AM   Result Value Ref Range    Magnesium Level 2.00 1.60 - 2.60 mg/dL   Comprehensive Metabolic Panel    Collection Time: 12/31/23  3:51 AM   Result Value Ref Range    Sodium Level 130 (L) 136 - 145 mmol/L    Potassium Level 3.5 3.5 - 5.1 mmol/L    Chloride 94 (L) 98 - 107 mmol/L    Carbon Dioxide 26 23 - 31 mmol/L    Glucose Level 99 82 - 115 mg/dL    Blood Urea Nitrogen 17.4 9.8 - 20.1 mg/dL    Creatinine 0.74 0.55 - 1.02 mg/dL    Calcium Level Total 7.3 (L) 8.4 - 10.2 mg/dL    Protein Total 5.3 (L) 5.8 - 7.6 gm/dL    Albumin Level 2.6 (L) 3.4 - 4.8 g/dL    Globulin 2.7 2.4 - 3.5 gm/dL     Albumin/Globulin Ratio 1.0 (L) 1.1 - 2.0 ratio    Bilirubin Total 0.5 <=1.5 mg/dL    Alkaline Phosphatase 52 40 - 150 unit/L    Alanine Aminotransferase 24 0 - 55 unit/L    Aspartate Aminotransferase 43 (H) 5 - 34 unit/L    eGFR >60 mls/min/1.73/m2   Lipid Panel    Collection Time: 12/31/23  3:51 AM   Result Value Ref Range    Cholesterol Total 124 <=200 mg/dL    HDL Cholesterol 22 (L) 35 - 60 mg/dL    Triglyceride 155 (H) 37 - 140 mg/dL    Cholesterol/HDL Ratio 6 (H) 0 - 5    Very Low Density Lipoprotein 31     LDL Cholesterol 71.00 50.00 - 140.00 mg/dL   APTT    Collection Time: 12/31/23  3:51 AM   Result Value Ref Range    PTT 50.2 (H) 23.2 - 33.7 seconds   CBC with Differential    Collection Time: 12/31/23  3:51 AM   Result Value Ref Range    WBC 11.21 4.50 - 11.50 x10(3)/mcL    RBC 4.52 4.20 - 5.40 x10(6)/mcL    Hgb 14.2 12.0 - 16.0 g/dL    Hct 41.6 37.0 - 47.0 %    MCV 92.0 80.0 - 94.0 fL    MCH 31.4 (H) 27.0 - 31.0 pg    MCHC 34.1 33.0 - 36.0 g/dL    RDW 11.8 11.5 - 17.0 %    Platelet 212 130 - 400 x10(3)/mcL    MPV 9.8 7.4 - 10.4 fL    NRBC% 0.0 %   Manual Differential    Collection Time: 12/31/23  3:51 AM   Result Value Ref Range    WBC 11.21 x10(3)/mcL    Neutrophils % 87 %    Lymphs % 6 %    Monocytes % 3 %    Plasmacytes % 5 %    Neutrophils Abs 9.7527 (H) 2.1 - 9.2 x10(3)/mcL    Lymphs Abs 0.6726 0.6 - 4.6 x10(3)/mcL    Monocytes Abs 0.3363 0.1 - 1.3 x10(3)/mcL    Platelets Normal Normal, Adequate    RBC Morph Normal Normal   Path Review, Peripheral Smear    Collection Time: 12/31/23  3:51 AM   Result Value Ref Range    Peripheral Smear Evaluation       Unremarkable granulocytic maturation with absolute neutrophilia, favor reactive, a few monocytes, mature lymphocytes, a few plasmacytoid cells.  Normocytic normochromic red blood cell population with no significant anisocytosis.  Unremarkable platelet morphology.    NOTE: recommend flow cytometry analysis for proper assessment of circulating plasma  cells if clinically indicated.    Long Rutherford M.D., Ph.D.        D-Dimer, Quantitative    Collection Time: 12/31/23  3:51 AM   Result Value Ref Range    D-Dimer 3.09 (H) 0.00 - 0.50 ug/mL FEU   Troponin I    Collection Time: 12/31/23  6:58 PM   Result Value Ref Range    Troponin-I 0.979 (H) 0.000 - 0.045 ng/mL   Comprehensive Metabolic Panel    Collection Time: 01/01/24  4:32 AM   Result Value Ref Range    Sodium Level 130 (L) 136 - 145 mmol/L    Potassium Level 3.6 3.5 - 5.1 mmol/L    Chloride 96 (L) 98 - 107 mmol/L    Carbon Dioxide 26 23 - 31 mmol/L    Glucose Level 90 82 - 115 mg/dL    Blood Urea Nitrogen 17.7 9.8 - 20.1 mg/dL    Creatinine 0.72 0.55 - 1.02 mg/dL    Calcium Level Total 7.6 (L) 8.4 - 10.2 mg/dL    Protein Total 5.6 (L) 5.8 - 7.6 gm/dL    Albumin Level 2.4 (L) 3.4 - 4.8 g/dL    Globulin 3.2 2.4 - 3.5 gm/dL    Albumin/Globulin Ratio 0.8 (L) 1.1 - 2.0 ratio    Bilirubin Total 0.4 <=1.5 mg/dL    Alkaline Phosphatase 54 40 - 150 unit/L    Alanine Aminotransferase 29 0 - 55 unit/L    Aspartate Aminotransferase 48 (H) 5 - 34 unit/L    eGFR >60 mls/min/1.73/m2   Magnesium    Collection Time: 01/01/24  4:32 AM   Result Value Ref Range    Magnesium Level 1.80 1.60 - 2.60 mg/dL   CBC with Differential    Collection Time: 01/01/24  4:32 AM   Result Value Ref Range    WBC 9.40 4.50 - 11.50 x10(3)/mcL    RBC 4.11 (L) 4.20 - 5.40 x10(6)/mcL    Hgb 12.9 12.0 - 16.0 g/dL    Hct 38.0 37.0 - 47.0 %    MCV 92.5 80.0 - 94.0 fL    MCH 31.4 (H) 27.0 - 31.0 pg    MCHC 33.9 33.0 - 36.0 g/dL    RDW 11.9 11.5 - 17.0 %    Platelet 199 130 - 400 x10(3)/mcL    MPV 9.6 7.4 - 10.4 fL    NRBC% 0.0 %   Manual Differential    Collection Time: 01/01/24  4:32 AM   Result Value Ref Range    WBC 9.16 x10(3)/mcL    Neutrophils % 77 %    Lymphs % 10 %    Monocytes % 6 %    Eosinophils % 1 %    Basophils % 1 %    Myelocytes % 1 (H) <=0 %    Plasmacytes % 4 %    nRBC % 1 %    Neutrophils Abs 7.0532 2.1 - 9.2 x10(3)/mcL    Lymphs Abs  0.916 0.6 - 4.6 x10(3)/mcL    Monocytes Abs 0.5496 0.1 - 1.3 x10(3)/mcL    Eosinophils Abs 0.0916 0 - 0.9 x10(3)/mcL    Basophils Abs 0.0916 0 - 0.2 x10(3)/mcL    Platelets Normal Normal, Adequate    RBC Morph Normal Normal   Comprehensive Metabolic Panel    Collection Time: 01/02/24  3:25 AM   Result Value Ref Range    Sodium Level 134 (L) 136 - 145 mmol/L    Potassium Level 3.9 3.5 - 5.1 mmol/L    Chloride 95 (L) 98 - 107 mmol/L    Carbon Dioxide 24 23 - 31 mmol/L    Glucose Level 91 82 - 115 mg/dL    Blood Urea Nitrogen 17.1 9.8 - 20.1 mg/dL    Creatinine 0.78 0.55 - 1.02 mg/dL    Calcium Level Total 8.4 8.4 - 10.2 mg/dL    Protein Total 7.0 5.8 - 7.6 gm/dL    Albumin Level 3.0 (L) 3.4 - 4.8 g/dL    Globulin 4.0 (H) 2.4 - 3.5 gm/dL    Albumin/Globulin Ratio 0.8 (L) 1.1 - 2.0 ratio    Bilirubin Total 0.5 <=1.5 mg/dL    Alkaline Phosphatase 69 40 - 150 unit/L    Alanine Aminotransferase 36 0 - 55 unit/L    Aspartate Aminotransferase 47 (H) 5 - 34 unit/L    eGFR >60 mls/min/1.73/m2   Magnesium    Collection Time: 01/02/24  3:25 AM   Result Value Ref Range    Magnesium Level 1.80 1.60 - 2.60 mg/dL   CBC with Differential    Collection Time: 01/02/24  3:25 AM   Result Value Ref Range    WBC 16.57 (H) 4.50 - 11.50 x10(3)/mcL    RBC 4.90 4.20 - 5.40 x10(6)/mcL    Hgb 15.3 12.0 - 16.0 g/dL    Hct 45.9 37.0 - 47.0 %    MCV 93.7 80.0 - 94.0 fL    MCH 31.2 (H) 27.0 - 31.0 pg    MCHC 33.3 33.0 - 36.0 g/dL    RDW 11.9 11.5 - 17.0 %    Platelet 295 130 - 400 x10(3)/mcL    MPV 10.0 7.4 - 10.4 fL    Neut % 77.8 %    Lymph % 12.6 %    Mono % 6.8 %    Eos % 1.7 %    Basophil % 0.4 %    Lymph # 2.09 0.6 - 4.6 x10(3)/mcL    Neut # 12.89 (H) 2.1 - 9.2 x10(3)/mcL    Mono # 1.13 0.1 - 1.3 x10(3)/mcL    Eos # 0.28 0 - 0.9 x10(3)/mcL    Baso # 0.06 <=0.2 x10(3)/mcL    IG# 0.12 (H) 0 - 0.04 x10(3)/mcL    IG% 0.7 %    NRBC% 0.0 %     Significant Imaging:  Imaging Results              X-Ray Chest 1 View (Final result)  Result time 12/29/23  12:13:33      Final result by Javier Mcniel MD (12/29/23 12:13:33)                   Impression:      Minimal blunting of the right costophrenic angle as above.    Slightly more confluent opacities in the right cardiophrenic angle region early infiltrate can not be completely excluded.    Otherwise no active pulmonary disease and no change as compared with previous exam      Electronically signed by: Javier Mcneil  Date:    12/29/2023  Time:    12:13               Narrative:    EXAMINATION:  XR CHEST 1 VIEW    CPT 66480    CLINICAL HISTORY:  shortness of breath;    COMPARISON:  July 6, 2020    FINDINGS:  Examination reveals mediastinal cardiac silhouette to be within normal limits left lung field is clear and free of gross infiltrates atelectasis or effusions.    Slightly more confluent opacities identified in the right cardiophrenic angle region early infiltrate can not be completely excluded.    There might be minimal blunting of the right costophrenic angle which may indicate the presence of a small right-sided pleural effusion                                    Telemetry: SR     Physical Exam  Constitutional:       General: She is not in acute distress.     Appearance: Normal appearance.   HENT:      Head: Normocephalic.      Mouth/Throat:      Mouth: Mucous membranes are moist.   Cardiovascular:      Rate and Rhythm: Normal rate and regular rhythm.      Pulses: Normal pulses.      Heart sounds: Murmur (2/6 TU) heard.   Pulmonary:      Effort: Pulmonary effort is normal. No respiratory distress.      Comments: NC O2  Abdominal:      Palpations: Abdomen is soft.   Musculoskeletal:         General: No swelling. Normal range of motion.      Right lower leg: No edema.      Left lower leg: No edema.   Skin:     General: Skin is warm and dry.   Neurological:      General: No focal deficit present.      Mental Status: She is alert and oriented to person, place, and time.   Psychiatric:         Mood and  Affect: Mood normal.         Behavior: Behavior normal.         Judgment: Judgment normal.       Home Medications:   No current facility-administered medications on file prior to encounter.     Current Outpatient Medications on File Prior to Encounter   Medication Sig Dispense Refill    acetaminophen-codeine 300-30mg (TYLENOL #3) 300-30 mg Tab Take 1 tablet by mouth daily as needed (pain).      amLODIPine (NORVASC) 10 MG tablet Take 1 tablet (10 mg total) by mouth once daily. 90 tablet 3    cinacalcet (SENSIPAR) 30 MG Tab Take 1 tablet (30 mg total) by mouth once daily. 30 tablet 3    DULoxetine (CYMBALTA) 60 MG capsule Take 1 capsule (60 mg total) by mouth every morning. (Patient taking differently: Take 60 mg by mouth 2 (two) times daily.) 30 capsule 11    gabapentin (NEURONTIN) 300 MG capsule Take 1 capsule (300 mg total) by mouth 2 (two) times daily. 60 capsule 11    levothyroxine (SYNTHROID) 75 MCG tablet Take 1 tablet (75 mcg total) by mouth once daily. 90 tablet 3    losartan (COZAAR) 25 MG tablet Take 0.5 tablets (12.5 mg total) by mouth once daily. 45 tablet 3    metoprolol succinate (TOPROL-XL) 25 MG 24 hr tablet Take 1 tablet (25 mg total) by mouth 2 (two) times daily. 180 tablet 1    pravastatin (PRAVACHOL) 20 MG tablet Take 20 mg by mouth once daily.      traZODone (DESYREL) 100 MG tablet Take 1 tablet (100 mg total) by mouth every evening. 30 tablet 11     Current Inpatient Medications:    Current Facility-Administered Medications:     0.9%  NaCl infusion, , Intravenous, PRN, Brendon Portillo MD, Stopped at 12/30/23 1658    acetaminophen tablet 650 mg, 650 mg, Oral, Q8H PRN, Evert Murillo MD, 650 mg at 12/31/23 2005    acetaminophen tablet 650 mg, 650 mg, Oral, Q8H PRN, Evert Murillo MD    acetaminophen-codeine 300-30mg per tablet 1 tablet, 1 tablet, Oral, Daily PRN, Evert Murillo MD    albuterol-ipratropium 2.5 mg-0.5 mg/3 mL nebulizer solution 3 mL, 3 mL, Nebulization, Q4H  PRN, Evert Murillo MD    amLODIPine tablet 10 mg, 10 mg, Oral, Daily, Evert Murillo MD, 10 mg at 01/01/24 0907    aspirin chewable tablet 81 mg, 81 mg, Oral, Daily, aBkari Murillo ANP, 81 mg at 01/01/24 0906    atorvastatin tablet 40 mg, 40 mg, Oral, Daily, Bakari Murillo ANP, 40 mg at 01/01/24 0907    azithromycin 500 mg in dextrose 5 % 250 mL IVPB (ready to mix), 500 mg, Intravenous, Q24H, Evert Murillo MD, Stopped at 01/01/24 1748    cefTRIAXone (ROCEPHIN) 1 g in dextrose 5 % in water (D5W) 100 mL IVPB (MB+), 1 g, Intravenous, Q24H, Evert Murillo MD, Stopped at 01/01/24 1430    cinacalcet tablet 30 mg, 30 mg, Oral, Daily, Evert Murillo MD, 30 mg at 01/01/24 0907    DULoxetine DR capsule 60 mg, 60 mg, Oral, BID, Evert Murillo MD, 60 mg at 01/01/24 2048    enoxaparin injection 60 mg, 1 mg/kg, Subcutaneous, Q12H, Juan Luis Onofre QAnali, NP, 60 mg at 01/01/24 2048    furosemide injection 20 mg, 20 mg, Intravenous, Daily, Josué Crooks MD, 20 mg at 01/01/24 0906    gabapentin capsule 300 mg, 300 mg, Oral, BID, Evert Murillo MD, 300 mg at 01/01/24 2048    guaiFENesin 100 mg/5 ml syrup 200 mg, 200 mg, Oral, Q6H, Josué Crooks MD, 200 mg at 01/02/24 0420    levothyroxine tablet 75 mcg, 75 mcg, Oral, Daily, Evert Murillo MD, 75 mcg at 01/01/24 0907    losartan split tablet 12.5 mg, 12.5 mg, Oral, Daily, Evert Murillo MD, 12.5 mg at 01/01/24 0906    melatonin tablet 6 mg, 6 mg, Oral, Nightly PRN, Evert Murillo MD    metoprolol succinate (TOPROL-XL) 24 hr tablet 25 mg, 25 mg, Oral, BID, Evert Murillo MD, 25 mg at 01/01/24 2048    morphine injection 2 mg, 2 mg, Intravenous, Q2H PRN, Jemima, Juan Luis Q., NP    nitroGLYCERIN SL tablet 0.4 mg, 0.4 mg, Sublingual, Q5 Min PRN, Jemima Juan Luis Q., NP    sodium chloride 0.9% flush 10 mL, 10 mL, Intravenous, PRN, Evert Murillo MD    traZODone tablet 100 mg, 100 mg, Oral, QHS,  Evert Murillo MD, 100 mg at 01/01/24 2048    VTE Risk Mitigation (From admission, onward)           Ordered     enoxaparin injection 60 mg  Every 12 hours (non-standard times)         12/31/23 1956     IP VTE HIGH RISK PATIENT  Once         12/30/23 0022     Place sequential compression device  Until discontinued         12/30/23 0022                  Assessment:   NSTEMI - Type Unspecified    - Troponin Peak with Initial Troponin 2.126    - ECHO (12.29.23) - LVEF 55-60%, Normal Wall Motion     - Coronary Calcium Score 5.18.18 - Total 229    - MPI (2011) - Normal Perfusion Study, No Perfusion Defects Noted  CAP  Leukocytosis - Resolved   Cough 2/2 Above  CP 2/2 Above - Atypical   HTN  HLD  Anxiety  GERD  Fibromyalgia  OA  Hypothyroidism  No Hx of GIB    Plan:   Treatment of Pneumonitis per Primary Team  Continue ASA and Statin  Medical Management Complete with Tx of NSTEMI for 48 Hours  Plan for Nuclear LexiScan Stress Test today ~ family wishes for SPECT prior then consider LHC if needed  Keep NPO after MN  Keep K > 4.0 and Mg > 2.0   Will Continue to Follow   Labs in AM: CBC, CMP and Mg    Heaven Hayward NP  Cardiology  [facility]  01/02/2024

## 2024-01-03 LAB
ANION GAP SERPL CALC-SCNC: 10 MEQ/L
BASOPHILS # BLD AUTO: 0.03 X10(3)/MCL
BASOPHILS NFR BLD AUTO: 0.3 %
BUN SERPL-MCNC: 15.4 MG/DL (ref 9.8–20.1)
CALCIUM SERPL-MCNC: 7.7 MG/DL (ref 8.4–10.2)
CHLORIDE SERPL-SCNC: 94 MMOL/L (ref 98–107)
CO2 SERPL-SCNC: 31 MMOL/L (ref 23–31)
CREAT SERPL-MCNC: 0.8 MG/DL (ref 0.55–1.02)
CREAT/UREA NIT SERPL: 19
EOSINOPHIL # BLD AUTO: 0.25 X10(3)/MCL (ref 0–0.9)
EOSINOPHIL NFR BLD AUTO: 2.3 %
ERYTHROCYTE [DISTWIDTH] IN BLOOD BY AUTOMATED COUNT: 11.9 % (ref 11.5–17)
GFR SERPLBLD CREATININE-BSD FMLA CKD-EPI: >60 MLS/MIN/1.73/M2
GLUCOSE SERPL-MCNC: 94 MG/DL (ref 82–115)
HCT VFR BLD AUTO: 37.5 % (ref 37–47)
HGB BLD-MCNC: 12.8 G/DL (ref 12–16)
IMM GRANULOCYTES # BLD AUTO: 0.1 X10(3)/MCL (ref 0–0.04)
IMM GRANULOCYTES NFR BLD AUTO: 0.9 %
LYMPHOCYTES # BLD AUTO: 1.78 X10(3)/MCL (ref 0.6–4.6)
LYMPHOCYTES NFR BLD AUTO: 16.6 %
MCH RBC QN AUTO: 31.8 PG (ref 27–31)
MCHC RBC AUTO-ENTMCNC: 34.1 G/DL (ref 33–36)
MCV RBC AUTO: 93.1 FL (ref 80–94)
MONOCYTES # BLD AUTO: 0.86 X10(3)/MCL (ref 0.1–1.3)
MONOCYTES NFR BLD AUTO: 8 %
NEUTROPHILS # BLD AUTO: 7.73 X10(3)/MCL (ref 2.1–9.2)
NEUTROPHILS NFR BLD AUTO: 71.9 %
NRBC BLD AUTO-RTO: 0 %
PLATELET # BLD AUTO: 328 X10(3)/MCL (ref 130–400)
PMV BLD AUTO: 9.1 FL (ref 7.4–10.4)
POTASSIUM SERPL-SCNC: 4 MMOL/L (ref 3.5–5.1)
RBC # BLD AUTO: 4.03 X10(6)/MCL (ref 4.2–5.4)
SODIUM SERPL-SCNC: 135 MMOL/L (ref 136–145)
WBC # SPEC AUTO: 10.75 X10(3)/MCL (ref 4.5–11.5)

## 2024-01-03 PROCEDURE — 63600175 PHARM REV CODE 636 W HCPCS: Performed by: NURSE PRACTITIONER

## 2024-01-03 PROCEDURE — 27000221 HC OXYGEN, UP TO 24 HOURS

## 2024-01-03 PROCEDURE — 25000003 PHARM REV CODE 250: Performed by: NURSE PRACTITIONER

## 2024-01-03 PROCEDURE — 63600175 PHARM REV CODE 636 W HCPCS: Performed by: INTERNAL MEDICINE

## 2024-01-03 PROCEDURE — 97530 THERAPEUTIC ACTIVITIES: CPT

## 2024-01-03 PROCEDURE — 94761 N-INVAS EAR/PLS OXIMETRY MLT: CPT

## 2024-01-03 PROCEDURE — 94640 AIRWAY INHALATION TREATMENT: CPT

## 2024-01-03 PROCEDURE — 25000242 PHARM REV CODE 250 ALT 637 W/ HCPCS: Performed by: INTERNAL MEDICINE

## 2024-01-03 PROCEDURE — 21400001 HC TELEMETRY ROOM

## 2024-01-03 PROCEDURE — 80048 BASIC METABOLIC PNL TOTAL CA: CPT | Performed by: INTERNAL MEDICINE

## 2024-01-03 PROCEDURE — 25000003 PHARM REV CODE 250: Performed by: INTERNAL MEDICINE

## 2024-01-03 PROCEDURE — 85025 COMPLETE CBC W/AUTO DIFF WBC: CPT | Performed by: INTERNAL MEDICINE

## 2024-01-03 PROCEDURE — 99900035 HC TECH TIME PER 15 MIN (STAT)

## 2024-01-03 RX ORDER — CLOPIDOGREL BISULFATE 75 MG/1
75 TABLET ORAL DAILY
Status: DISCONTINUED | OUTPATIENT
Start: 2024-01-03 | End: 2024-01-08 | Stop reason: HOSPADM

## 2024-01-03 RX ORDER — DICLOFENAC SODIUM 10 MG/G
2 GEL TOPICAL 3 TIMES DAILY PRN
Status: DISCONTINUED | OUTPATIENT
Start: 2024-01-03 | End: 2024-01-08 | Stop reason: HOSPADM

## 2024-01-03 RX ORDER — CLOPIDOGREL BISULFATE 75 MG/1
225 TABLET ORAL ONCE
Status: COMPLETED | OUTPATIENT
Start: 2024-01-03 | End: 2024-01-03

## 2024-01-03 RX ORDER — IPRATROPIUM BROMIDE AND ALBUTEROL SULFATE 2.5; .5 MG/3ML; MG/3ML
3 SOLUTION RESPIRATORY (INHALATION) EVERY 6 HOURS
Status: DISCONTINUED | OUTPATIENT
Start: 2024-01-03 | End: 2024-01-08 | Stop reason: HOSPADM

## 2024-01-03 RX ADMIN — ASPIRIN 81 MG CHEWABLE TABLET 81 MG: 81 TABLET CHEWABLE at 09:01

## 2024-01-03 RX ADMIN — LEVOTHYROXINE SODIUM 75 MCG: 25 TABLET ORAL at 09:01

## 2024-01-03 RX ADMIN — LOSARTAN POTASSIUM 12.5 MG: 25 TABLET, FILM COATED ORAL at 09:01

## 2024-01-03 RX ADMIN — ENOXAPARIN SODIUM 60 MG: 80 INJECTION SUBCUTANEOUS at 08:01

## 2024-01-03 RX ADMIN — METOPROLOL SUCCINATE 25 MG: 25 TABLET, EXTENDED RELEASE ORAL at 09:01

## 2024-01-03 RX ADMIN — GUAIFENESIN 200 MG: 200 SOLUTION ORAL at 12:01

## 2024-01-03 RX ADMIN — GUAIFENESIN 200 MG: 200 SOLUTION ORAL at 06:01

## 2024-01-03 RX ADMIN — DULOXETINE HYDROCHLORIDE 60 MG: 30 CAPSULE, DELAYED RELEASE ORAL at 08:01

## 2024-01-03 RX ADMIN — AMLODIPINE BESYLATE 10 MG: 5 TABLET ORAL at 09:01

## 2024-01-03 RX ADMIN — IPRATROPIUM BROMIDE AND ALBUTEROL SULFATE 3 ML: 2.5; .5 SOLUTION RESPIRATORY (INHALATION) at 01:01

## 2024-01-03 RX ADMIN — DULOXETINE HYDROCHLORIDE 60 MG: 30 CAPSULE, DELAYED RELEASE ORAL at 09:01

## 2024-01-03 RX ADMIN — ATORVASTATIN CALCIUM 40 MG: 40 TABLET, FILM COATED ORAL at 09:01

## 2024-01-03 RX ADMIN — GABAPENTIN 300 MG: 300 CAPSULE ORAL at 09:01

## 2024-01-03 RX ADMIN — CLOPIDOGREL BISULFATE 225 MG: 75 TABLET ORAL at 01:01

## 2024-01-03 RX ADMIN — CLOPIDOGREL BISULFATE 75 MG: 75 TABLET ORAL at 12:01

## 2024-01-03 RX ADMIN — GABAPENTIN 300 MG: 300 CAPSULE ORAL at 08:01

## 2024-01-03 RX ADMIN — IPRATROPIUM BROMIDE AND ALBUTEROL SULFATE 3 ML: 2.5; .5 SOLUTION RESPIRATORY (INHALATION) at 07:01

## 2024-01-03 RX ADMIN — TRAZODONE HYDROCHLORIDE 100 MG: 100 TABLET ORAL at 09:01

## 2024-01-03 RX ADMIN — CINACALCET 30 MG: 30 TABLET, FILM COATED ORAL at 09:01

## 2024-01-03 RX ADMIN — Medication: at 08:01

## 2024-01-03 RX ADMIN — METOPROLOL SUCCINATE 25 MG: 25 TABLET, EXTENDED RELEASE ORAL at 08:01

## 2024-01-03 RX ADMIN — ACETAMINOPHEN 650 MG: 325 TABLET, FILM COATED ORAL at 08:01

## 2024-01-03 NOTE — PT/OT/SLP PROGRESS
Physical Therapy Treatment    Patient Name:  Edwin Boyer   MRN:  [id number]    Recommendations:     Discharge therapy intensity: Moderate Intensity Therapy   Discharge Equipment Recommendations: walker, rolling, to be determined by next level of care  Barriers to discharge: Ongoing medical needs    Assessment:     Edwin Boyer is a 89 y.o. female admitted with a medical diagnosis of NSTEMI (non-ST elevated myocardial infarction).  She presents with the following impairments/functional limitations: weakness, impaired endurance, impaired self care skills, impaired functional mobility, gait instability, impaired balance, impaired cardiopulmonary response to activity. Pt tolerated session well, increased time needed due to incontinence in standing requiring changing of socks/gown and donning of brief. Pt required Odilon for standing t/f, once in standing ambulated 60'/60' with RW CGA-Odilon with 1 seated rest break in between bouts due to fatigue/SOB.     Rehab Prognosis: Good; patient would benefit from acute skilled PT services to address these deficits and reach maximum level of function.    Recent Surgery: * No surgery found *      Plan:     During this hospitalization, patient to be seen 5 x/week to address the identified rehab impairments via gait training, therapeutic activities, therapeutic exercises, neuromuscular re-education and progress toward the following goals:    Plan of Care Expires:  02/02/24    Subjective     Chief Complaint: none noted at this time  Patient/Family Comments/goals: to get stronger   Pain/Comfort:   0/10      Objective:     Communicated with RN prior to session.  Patient found HOB elevated with   upon PT entry to room.     General Precautions: Standard, fall  Orthopedic Precautions: N/A  Braces: N/A  Respiratory Status: Nasal cannula, flow 3 L/min  Skin Integrity:  skin breakdown to sacrum      Functional Mobility:  Bed Mobility:     Supine to Sit: contact guard  assistance  Transfers:     Sit to Stand:  minimum assistance with rolling walker  Gait: Pt ambulated 60'/60' with RW CGA-Odilon with seated rest breaks in between bouts due to fatigue/SOB. O2 desaturations to 88% but able to recover quickly to 92% with seated rest.       Education:  Patient provided with verbal education education regarding PT role/goals/POC, fall prevention, and safety awareness.  Understanding was verbalized.     Patient left up in chair with all lines intact, call button in reach, and son present..    GOALS:   Multidisciplinary Problems       Physical Therapy Goals          Problem: Physical Therapy    Goal Priority Disciplines Outcome Goal Variances Interventions   Physical Therapy Goal     PT, PT/OT Ongoing, Progressing     Description: Goals to be met by: 2024     Patient will increase functional independence with mobility by performin. Supine to sit with Stand-by Assistance  2. Sit to stand transfer with Stand-by Assistance  3. Bed to chair transfer with Stand-by Assistance using Rolling Walker  4. Gait  x 150 feet with Stand-by Assistance using Rolling Walker.                          Time Tracking:     PT Received On: 24  PT Start Time: 1400     PT Stop Time: 1426  PT Total Time (min): 26 min     Billable Minutes: Therapeutic Activity 26    Treatment Type: Treatment  PT/PTA: PT     Number of PTA visits since last PT visit: 2024

## 2024-01-03 NOTE — PLAN OF CARE
01/03/24 0946   Medicare Message   Important Message from Medicare regarding Discharge Appeal Rights Given to patient/caregiver;Explained to patient/caregiver

## 2024-01-03 NOTE — PROGRESS NOTES
"    [facility]    [facility]  [facility] Note    Patient Name: Edwin Boyer  MRN: [id number]  Admission Date: 12/29/2023  Hospital Length of Stay: 5 days  Code Status: Full Code   Attending Provider: Josué Crooks MD   Consulting Provider: Heaven Hayward NP  Primary Care Physician: Mariely Soto MD  Principal Problem:NSTEMI (non-ST elevated myocardial infarction)    Patient information was obtained from patient, past medical records, and ER records.     Subjective:     Reason for Consult: Elevated Troponin      HPI: Ms. Boyer is an 89 y/o female who is known to [facility], Dr. Guidry. The patient presented to [facility] ER on 12.29.23 with c/o a Cough. She reported that about 2 days prior she developed a cough which progressively worsened and her Pulse Oxymetry at Home was 86% on RA. She denied CP, Fever, Chills and SOB. Reported Mild SOB at Rest. Secondary to these findings and symptoms she presented to the ER for evaluation. Upon Evaluation in the ER she was found to have an RA O2 saturation in the Low 80s and began to endorse CP with a Cough. The CP was not presented unless she was coughing. The Pain was 8/10 on a verbal scale with coughing and described as a "sharp pain". She reported that the cough was productive and yielded a yellow/green sputum. In the ER she was found to have a WBC 14.70, Na 130, K 3.6, Cl 95, .2, Troponin 1.936, and Negative Flu A/B and COVID Screenings. She was started on IV Abx and ASA 325mg x 1. CIS has been consulted for NSTEMI in the Setting of Pneumonia.     Hospital Course:   12.30.23: NAD. "I am ok." Denies CP and SOB.   12.31.23: NAD. "I am feeling better today." Denies CP and SOB. VSS.  1.1.24: NAD. "I am good." Denies CP, SOB and Palps. VSS. Na 130.  1.2.24: NAD, VSS. Family wishes to proceed with Stress test. She denies SOB or CP  1.3.24: NAD, She denies SOB, CP, or palpitations. Family is reluctant to proceed with LHC at this time, will elect for medical " management.     PMH: Anxiety, Carpel Tunnel Syndrome Bilaterally, CKD Stage III, Fibromyalgia, GERD, HTN, HLD, Hypothyroidism, OA, Osteoporosis, Hyperparathyroidism, Urinary Incontinence, VHD (Mod MR)  PSH: Appendectomy, , Colonoscopy, EMERSON, TKA, Breast Biopsy,   Family History: Father, D, H; Mother, D, HF  Social History: Denies Illicit Drug, ETOH and Tobacco Use    Previous Cardiac Diagnostics:   SPECT 23:  Abnormal myocardial perfusion scan.  moderate intensity, moderate sized,  in the basal to apical inferoapical wall(s) in the typical distribution of the RCA territory.  There are no other significant perfusion abnormalities.  The gated perfusion images showed an ejection fraction of 64% at rest.  The ECG portion of the study is abnormal but not diagnostic.    ECHO 23:  Left Ventricle: The left ventricle is normal in size. Normal wall thickness. Normal wall motion. Mid septal wall is slightly hypokinetic. There is normal systolic function with a visually estimated ejection fraction of 55 - 60%. Grade II diastolic dysfunction.  Right Ventricle: Normal right ventricular cavity size. Systolic function is normal.  Left Atrium: Left atrium is dilated.  Aortic Valve: The aortic valve is a trileaflet valve. There is moderate aortic valve sclerosis. Mildly calcified cusps. There is no stenosis. Aortic valve peak velocity is 1.55 m/s. Mean gradient is 5 mmHg. There is mild aortic regurgitation.  Mitral Valve: Moderately thickened leaflets. There is mild mitral annular calcification present. There is mild stenosis. The mean pressure gradient across the mitral valve is 5 mmHg at a heart rate of 79 bpm. There is mild regurgitation.  Tricuspid Valve: There is no stenosis. There is mild regurgitation.  Pulmonic Valve: There is no stenosis.    ECHO 5.3.23:  The study quality is average.   The left ventricle is normal in size. Global left ventricular systolic function is normal. The left ventricular ejection  fraction is 60%. Left ventricular diastolic function is normal. Mild asymmetric septal left ventricular hypertrophy is present.  The left atrial diameter is mildly increased.  Moderate (2+) mitral regurgitation. PISA is 0.54 cm. The MR volume color doppler method is 20 ml. EROA measures 0.1 cm². Vena contracta is 4.5 mm.   Mild (1+) tricuspid regurgitation. Mild (1+) aortic regurgitation. Mild (1+) pulmonic regurgitation.   The estimated pulmonary artery systolic pressure is 39 mmHg assuming a right atrial pressure of 3 mmHg.     Calcium Score 5.18.18:  Total Calcium Score 299    MPI 10.26.11:  This is a normal perfusion study, no perfusion defects noted.   This scan is suggestive of low risk for future cardiovascular events.  The left ventricular cavity is noted to be normal on the stress study. The left ventricular ejection fraction was calculated to be 74% and left ventricular global function is normal.   The study quality is good.    Review of patient's allergies indicates:   Allergen Reactions    Meperidine      Other reaction(s): EXCITATIVE TYPE PSYCHOSIS    Sulfamethoxazole      No current facility-administered medications on file prior to encounter.     Current Outpatient Medications on File Prior to Encounter   Medication Sig    acetaminophen-codeine 300-30mg (TYLENOL #3) 300-30 mg Tab Take 1 tablet by mouth daily as needed (pain).    amLODIPine (NORVASC) 10 MG tablet Take 1 tablet (10 mg total) by mouth once daily.    cinacalcet (SENSIPAR) 30 MG Tab Take 1 tablet (30 mg total) by mouth once daily.    DULoxetine (CYMBALTA) 60 MG capsule Take 1 capsule (60 mg total) by mouth every morning. (Patient taking differently: Take 60 mg by mouth 2 (two) times daily.)    gabapentin (NEURONTIN) 300 MG capsule Take 1 capsule (300 mg total) by mouth 2 (two) times daily.    levothyroxine (SYNTHROID) 75 MCG tablet Take 1 tablet (75 mcg total) by mouth once daily.    losartan (COZAAR) 25 MG tablet Take 0.5 tablets (12.5 mg  total) by mouth once daily.    metoprolol succinate (TOPROL-XL) 25 MG 24 hr tablet Take 1 tablet (25 mg total) by mouth 2 (two) times daily.    pravastatin (PRAVACHOL) 20 MG tablet Take 20 mg by mouth once daily.    traZODone (DESYREL) 100 MG tablet Take 1 tablet (100 mg total) by mouth every evening.     Review of Systems   Constitutional:  Negative for chills and fever.   Respiratory:  Negative for shortness of breath.    Cardiovascular:  Negative for chest pain, palpitations and leg swelling.   All other systems reviewed and are negative.    Objective:     Vital Signs (Most Recent):  Temp: 98.8 °F (37.1 °C) (01/03/24 0727)  Pulse: 74 (01/03/24 0741)  Resp: 18 (01/03/24 0741)  BP: 129/70 (01/03/24 0727)  SpO2: (!) 92 % (01/03/24 0741) Vital Signs (24h Range):  Temp:  [96.1 °F (35.6 °C)-98.8 °F (37.1 °C)] 98.8 °F (37.1 °C)  Pulse:  [65-84] 74  Resp:  [17-20] 18  SpO2:  [90 %-96 %] 92 %  BP: (119-147)/(66-76) 129/70     Weight: 64.4 kg (142 lb)  Body mass index is 26.83 kg/m².    SpO2: (!) 92 %         Intake/Output Summary (Last 24 hours) at 1/3/2024 0932  Last data filed at 1/2/2024 2205  Gross per 24 hour   Intake 120 ml   Output --   Net 120 ml       Lines/Drains/Airways       Drain  Duration             Female External Urinary Catheter w/ Suction 12/29/23 5 days              Peripheral Intravenous Line  Duration                  Peripheral IV - Single Lumen 12/29/23 1544 20 G Left Antecubital 4 days                  Significant Labs:  Recent Results (from the past 72 hour(s))   Troponin I    Collection Time: 12/31/23  6:58 PM   Result Value Ref Range    Troponin-I 0.979 (H) 0.000 - 0.045 ng/mL   Comprehensive Metabolic Panel    Collection Time: 01/01/24  4:32 AM   Result Value Ref Range    Sodium Level 130 (L) 136 - 145 mmol/L    Potassium Level 3.6 3.5 - 5.1 mmol/L    Chloride 96 (L) 98 - 107 mmol/L    Carbon Dioxide 26 23 - 31 mmol/L    Glucose Level 90 82 - 115 mg/dL    Blood Urea Nitrogen 17.7 9.8 - 20.1  mg/dL    Creatinine 0.72 0.55 - 1.02 mg/dL    Calcium Level Total 7.6 (L) 8.4 - 10.2 mg/dL    Protein Total 5.6 (L) 5.8 - 7.6 gm/dL    Albumin Level 2.4 (L) 3.4 - 4.8 g/dL    Globulin 3.2 2.4 - 3.5 gm/dL    Albumin/Globulin Ratio 0.8 (L) 1.1 - 2.0 ratio    Bilirubin Total 0.4 <=1.5 mg/dL    Alkaline Phosphatase 54 40 - 150 unit/L    Alanine Aminotransferase 29 0 - 55 unit/L    Aspartate Aminotransferase 48 (H) 5 - 34 unit/L    eGFR >60 mls/min/1.73/m2   Magnesium    Collection Time: 01/01/24  4:32 AM   Result Value Ref Range    Magnesium Level 1.80 1.60 - 2.60 mg/dL   CBC with Differential    Collection Time: 01/01/24  4:32 AM   Result Value Ref Range    WBC 9.40 4.50 - 11.50 x10(3)/mcL    RBC 4.11 (L) 4.20 - 5.40 x10(6)/mcL    Hgb 12.9 12.0 - 16.0 g/dL    Hct 38.0 37.0 - 47.0 %    MCV 92.5 80.0 - 94.0 fL    MCH 31.4 (H) 27.0 - 31.0 pg    MCHC 33.9 33.0 - 36.0 g/dL    RDW 11.9 11.5 - 17.0 %    Platelet 199 130 - 400 x10(3)/mcL    MPV 9.6 7.4 - 10.4 fL    NRBC% 0.0 %   Manual Differential    Collection Time: 01/01/24  4:32 AM   Result Value Ref Range    WBC 9.16 x10(3)/mcL    Neutrophils % 77 %    Lymphs % 10 %    Monocytes % 6 %    Eosinophils % 1 %    Basophils % 1 %    Myelocytes % 1 (H) <=0 %    Plasmacytes % 4 %    nRBC % 1 %    Neutrophils Abs 7.0532 2.1 - 9.2 x10(3)/mcL    Lymphs Abs 0.916 0.6 - 4.6 x10(3)/mcL    Monocytes Abs 0.5496 0.1 - 1.3 x10(3)/mcL    Eosinophils Abs 0.0916 0 - 0.9 x10(3)/mcL    Basophils Abs 0.0916 0 - 0.2 x10(3)/mcL    Platelets Normal Normal, Adequate    RBC Morph Normal Normal   Comprehensive Metabolic Panel    Collection Time: 01/02/24  3:25 AM   Result Value Ref Range    Sodium Level 134 (L) 136 - 145 mmol/L    Potassium Level 3.9 3.5 - 5.1 mmol/L    Chloride 95 (L) 98 - 107 mmol/L    Carbon Dioxide 24 23 - 31 mmol/L    Glucose Level 91 82 - 115 mg/dL    Blood Urea Nitrogen 17.1 9.8 - 20.1 mg/dL    Creatinine 0.78 0.55 - 1.02 mg/dL    Calcium Level Total 8.4 8.4 - 10.2 mg/dL     Protein Total 7.0 5.8 - 7.6 gm/dL    Albumin Level 3.0 (L) 3.4 - 4.8 g/dL    Globulin 4.0 (H) 2.4 - 3.5 gm/dL    Albumin/Globulin Ratio 0.8 (L) 1.1 - 2.0 ratio    Bilirubin Total 0.5 <=1.5 mg/dL    Alkaline Phosphatase 69 40 - 150 unit/L    Alanine Aminotransferase 36 0 - 55 unit/L    Aspartate Aminotransferase 47 (H) 5 - 34 unit/L    eGFR >60 mls/min/1.73/m2   Magnesium    Collection Time: 01/02/24  3:25 AM   Result Value Ref Range    Magnesium Level 1.80 1.60 - 2.60 mg/dL   CBC with Differential    Collection Time: 01/02/24  3:25 AM   Result Value Ref Range    WBC 16.57 (H) 4.50 - 11.50 x10(3)/mcL    RBC 4.90 4.20 - 5.40 x10(6)/mcL    Hgb 15.3 12.0 - 16.0 g/dL    Hct 45.9 37.0 - 47.0 %    MCV 93.7 80.0 - 94.0 fL    MCH 31.2 (H) 27.0 - 31.0 pg    MCHC 33.3 33.0 - 36.0 g/dL    RDW 11.9 11.5 - 17.0 %    Platelet 295 130 - 400 x10(3)/mcL    MPV 10.0 7.4 - 10.4 fL    Neut % 77.8 %    Lymph % 12.6 %    Mono % 6.8 %    Eos % 1.7 %    Basophil % 0.4 %    Lymph # 2.09 0.6 - 4.6 x10(3)/mcL    Neut # 12.89 (H) 2.1 - 9.2 x10(3)/mcL    Mono # 1.13 0.1 - 1.3 x10(3)/mcL    Eos # 0.28 0 - 0.9 x10(3)/mcL    Baso # 0.06 <=0.2 x10(3)/mcL    IG# 0.12 (H) 0 - 0.04 x10(3)/mcL    IG% 0.7 %    NRBC% 0.0 %   Nuclear Stress - Cardiology Interpreted    Collection Time: 01/02/24  1:06 PM   Result Value Ref Range    85% Max Predicted      Max Predicted      OHS CV CPX PATIENT IS MALE 0.0     [facility] CV CPX PATIENT IS FEMALE 1.0     Nuc Rest EF 64    RT Blood Gas    Collection Time: 01/02/24  8:26 PM   Result Value Ref Range    Sample Type Arterial Blood     Sample site Right Radial Artery     Drawn by damir ronquillo rrt     pH, Blood gas 7.650 (HH) 7.350 - 7.450    pCO2, Blood gas 27.0 (L) 35.0 - 45.0 mmHg    pO2, Blood gas 129.0 (H) 80.0 - 100.0 mmHg    Sodium, Blood Gas 122 (L) 137 - 145 mmol/L    Potassium, Blood Gas 3.9 3.5 - 5.0 mmol/L    Calcium Level Ionized 0.94 (L) 1.12 - 1.23 mmol/L    TOC2, Blood gas 30.5 mmol/L    Base  Excess, Blood gas 9.40 (H) -2.00 - 2.00 mmol/L    sO2, Blood gas 99.4 %    HCO3, Blood gas 29.7 (H) 22.0 - 26.0 mmol/L    THb, Blood gas 12.8 12 - 16 g/dL    O2 Hb, Blood Gas 96.2 94.0 - 97.0 %    CO Hgb 0.4 (L) 0.5 - 1.5 %    Met Hgb 1.0 0.4 - 1.5 %    Oxygen Device, Blood gas Cannula     LPM 4    Basic Metabolic Panel    Collection Time: 01/03/24  4:22 AM   Result Value Ref Range    Sodium Level 135 (L) 136 - 145 mmol/L    Potassium Level 4.0 3.5 - 5.1 mmol/L    Chloride 94 (L) 98 - 107 mmol/L    Carbon Dioxide 31 23 - 31 mmol/L    Glucose Level 94 82 - 115 mg/dL    Blood Urea Nitrogen 15.4 9.8 - 20.1 mg/dL    Creatinine 0.80 0.55 - 1.02 mg/dL    BUN/Creatinine Ratio 19     Calcium Level Total 7.7 (L) 8.4 - 10.2 mg/dL    Anion Gap 10.0 mEq/L    eGFR >60 mls/min/1.73/m2   CBC with Differential    Collection Time: 01/03/24  4:22 AM   Result Value Ref Range    WBC 10.75 4.50 - 11.50 x10(3)/mcL    RBC 4.03 (L) 4.20 - 5.40 x10(6)/mcL    Hgb 12.8 12.0 - 16.0 g/dL    Hct 37.5 37.0 - 47.0 %    MCV 93.1 80.0 - 94.0 fL    MCH 31.8 (H) 27.0 - 31.0 pg    MCHC 34.1 33.0 - 36.0 g/dL    RDW 11.9 11.5 - 17.0 %    Platelet 328 130 - 400 x10(3)/mcL    MPV 9.1 7.4 - 10.4 fL    Neut % 71.9 %    Lymph % 16.6 %    Mono % 8.0 %    Eos % 2.3 %    Basophil % 0.3 %    Lymph # 1.78 0.6 - 4.6 x10(3)/mcL    Neut # 7.73 2.1 - 9.2 x10(3)/mcL    Mono # 0.86 0.1 - 1.3 x10(3)/mcL    Eos # 0.25 0 - 0.9 x10(3)/mcL    Baso # 0.03 <=0.2 x10(3)/mcL    IG# 0.10 (H) 0 - 0.04 x10(3)/mcL    IG% 0.9 %    NRBC% 0.0 %     Significant Imaging:  Imaging Results              X-Ray Chest 1 View (Final result)  Result time 12/29/23 12:13:33      Final result by Javier Mcneil MD (12/29/23 12:13:33)                   Impression:      Minimal blunting of the right costophrenic angle as above.    Slightly more confluent opacities in the right cardiophrenic angle region early infiltrate can not be completely excluded.    Otherwise no active pulmonary disease and  no change as compared with previous exam      Electronically signed by: Javier Mcneil  Date:    12/29/2023  Time:    12:13               Narrative:    EXAMINATION:  XR CHEST 1 VIEW    CPT 83813    CLINICAL HISTORY:  shortness of breath;    COMPARISON:  July 6, 2020    FINDINGS:  Examination reveals mediastinal cardiac silhouette to be within normal limits left lung field is clear and free of gross infiltrates atelectasis or effusions.    Slightly more confluent opacities identified in the right cardiophrenic angle region early infiltrate can not be completely excluded.    There might be minimal blunting of the right costophrenic angle which may indicate the presence of a small right-sided pleural effusion                                    Telemetry: SR     Physical Exam  Constitutional:       General: She is not in acute distress.     Appearance: Normal appearance.   HENT:      Head: Normocephalic.      Mouth/Throat:      Mouth: Mucous membranes are moist.   Cardiovascular:      Rate and Rhythm: Normal rate and regular rhythm.      Pulses: Normal pulses.      Heart sounds: Murmur (2/6 TU) heard.   Pulmonary:      Effort: Pulmonary effort is normal. No respiratory distress.      Comments: NC O2  Abdominal:      Palpations: Abdomen is soft.   Musculoskeletal:         General: No swelling. Normal range of motion.      Right lower leg: No edema.      Left lower leg: No edema.   Skin:     General: Skin is warm and dry.   Neurological:      General: No focal deficit present.      Mental Status: She is alert and oriented to person, place, and time.   Psychiatric:         Mood and Affect: Mood normal.         Behavior: Behavior normal.         Judgment: Judgment normal.       Home Medications:   No current facility-administered medications on file prior to encounter.     Current Outpatient Medications on File Prior to Encounter   Medication Sig Dispense Refill    acetaminophen-codeine 300-30mg (TYLENOL #3) 300-30 mg  Tab Take 1 tablet by mouth daily as needed (pain).      amLODIPine (NORVASC) 10 MG tablet Take 1 tablet (10 mg total) by mouth once daily. 90 tablet 3    cinacalcet (SENSIPAR) 30 MG Tab Take 1 tablet (30 mg total) by mouth once daily. 30 tablet 3    DULoxetine (CYMBALTA) 60 MG capsule Take 1 capsule (60 mg total) by mouth every morning. (Patient taking differently: Take 60 mg by mouth 2 (two) times daily.) 30 capsule 11    gabapentin (NEURONTIN) 300 MG capsule Take 1 capsule (300 mg total) by mouth 2 (two) times daily. 60 capsule 11    levothyroxine (SYNTHROID) 75 MCG tablet Take 1 tablet (75 mcg total) by mouth once daily. 90 tablet 3    losartan (COZAAR) 25 MG tablet Take 0.5 tablets (12.5 mg total) by mouth once daily. 45 tablet 3    metoprolol succinate (TOPROL-XL) 25 MG 24 hr tablet Take 1 tablet (25 mg total) by mouth 2 (two) times daily. 180 tablet 1    pravastatin (PRAVACHOL) 20 MG tablet Take 20 mg by mouth once daily.      traZODone (DESYREL) 100 MG tablet Take 1 tablet (100 mg total) by mouth every evening. 30 tablet 11     Current Inpatient Medications:    Current Facility-Administered Medications:     0.9%  NaCl infusion, , Intravenous, PRN, Brendon Portillo MD, Stopped at 12/30/23 1658    acetaminophen tablet 650 mg, 650 mg, Oral, Q8H PRN, Evert Murillo MD, 650 mg at 01/02/24 1956    acetaminophen tablet 650 mg, 650 mg, Oral, Q8H PRN, Evert Murillo MD    acetaminophen-codeine 300-30mg per tablet 1 tablet, 1 tablet, Oral, Daily PRN, Evert Murillo MD    albuterol-ipratropium 2.5 mg-0.5 mg/3 mL nebulizer solution 3 mL, 3 mL, Nebulization, Q6H, Warner Campos MD, 3 mL at 01/03/24 0741    amLODIPine tablet 10 mg, 10 mg, Oral, Daily, Evert Murillo MD, 10 mg at 01/03/24 0927    aspirin chewable tablet 81 mg, 81 mg, Oral, Daily, Bakari Murillo ANP, 81 mg at 01/03/24 0926    atorvastatin tablet 40 mg, 40 mg, Oral, Daily, Bakari Murillo ANP, 40 mg at 01/03/24 0927     cinacalcet tablet 30 mg, 30 mg, Oral, Daily, Evert Murillo MD, 30 mg at 01/03/24 0927    DULoxetine DR capsule 60 mg, 60 mg, Oral, BID, Evert Murillo MD, 60 mg at 01/03/24 0927    enoxaparin injection 60 mg, 1 mg/kg, Subcutaneous, Q12H, Chris Onofreall Q., NP, 60 mg at 01/03/24 0927    gabapentin capsule 300 mg, 300 mg, Oral, BID, Evert Murillo MD, 300 mg at 01/03/24 0926    guaiFENesin 100 mg/5 ml syrup 200 mg, 200 mg, Oral, Q6H, Josué Crooks MD, 200 mg at 01/02/24 1730    levothyroxine tablet 75 mcg, 75 mcg, Oral, Daily, Evert Murillo MD, 75 mcg at 01/03/24 0927    losartan split tablet 12.5 mg, 12.5 mg, Oral, Daily, Evert Murillo MD, 12.5 mg at 01/03/24 0926    melatonin tablet 6 mg, 6 mg, Oral, Nightly PRN, Evert Murillo MD    metoprolol succinate (TOPROL-XL) 24 hr tablet 25 mg, 25 mg, Oral, BID, vEert Murillo MD, 25 mg at 01/03/24 0927    morphine injection 2 mg, 2 mg, Intravenous, Q2H PRN, Chris Onofreall Q., NP    nitroGLYCERIN SL tablet 0.4 mg, 0.4 mg, Sublingual, Q5 Min PRN, Chris Onofreall Q., NP    sodium chloride 0.9% flush 10 mL, 10 mL, Intravenous, PRN, Evert Murillo MD    traZODone tablet 100 mg, 100 mg, Oral, QHS, Evert Murillo MD, 100 mg at 01/02/24 1952    VTE Risk Mitigation (From admission, onward)           Ordered     enoxaparin injection 60 mg  Every 12 hours (non-standard times)         12/31/23 1956     IP VTE HIGH RISK PATIENT  Once         12/30/23 0022     Place sequential compression device  Until discontinued         12/30/23 0022                  Assessment:   NSTEMI - Type Unspecified    - SPECT (1.2.23): moderate intensity, moderate sized,  in the basal to apical inferoapical wall(s) in the typical distribution of the RCA territory.     - Troponin Peak with Initial Troponin 2.126    - ECHO (12.29.23) - LVEF 55-60%, Normal Wall Motion     - Coronary Calcium Score 5.18.18 - Total 229    - MPI (2011) -  Normal Perfusion Study, No Perfusion Defects Noted  CAP  Leukocytosis - Resolved   Cough 2/2 Above  CP 2/2 Above - Atypical   HTN  HLD  Anxiety  GERD  Fibromyalgia  OA  Hypothyroidism  No Hx of GIB    Plan:   Lexiscan SPECT reviewed   Treatment of Pneumonitis per Primary Team  Family reluctant to proceed with LHC at this time, will start medical managemnt at this time   Start Plavix & cont. BB  Continue ASA and Statin  Medical Management Complete with Tx of NSTEMI for 48 Hours  Keep K > 4.0 and Mg > 2.0   Will Continue to Follow   Labs in AM: CBC, CMP and Mg    Heaven Hayward NP  Cardiology  [facility]  01/03/2024    Physician addendum:  I have seen and examined this patient as a split-shared visit with the ELIS d/t complicated medical management of above problems written in assessment and high acuity requiring physician expertise in medical decision-making. I performed the substantive portion of the history and exam. Above medical decision-making is also formulated by me.    GENERAL: ill appearing  Cardiovascular exam:  S1, S2  Lungs:  Diffuse rales  Extremities:  1+ Edema bilaterally    Plan:  Recommend LHC +/- PCI, stress test appears at  moderate to high risk for future cardiac event, troponin is abnormal as well.  Start plavix 300 mg po x1 followed by 75 mg po daily  Continue aspirin, beta blocker, statin  Son and patient are undecided on moving forward.  I asked that if they decide to move forward with [facility] that it should be performed this hospitalization.  I also asked that if they decide to proceed with medic al therapy alone they should strongly consider an advanced directive as she will be at high risk for future cardiac events.  Postponing definitive therapy now and waiting until she decompensates will likely make an intervention in the future even more high risk given her age, frailty, and lung issues.    Braxton Guidry MD  Cardiologist

## 2024-01-03 NOTE — PLAN OF CARE
01/03/24 0946   Discharge Assessment   Assessment Type Discharge Planning Assessment   Confirmed/corrected address, phone number and insurance Yes   Source of Information patient;family   When was your last doctors appointment?   (PCP is Dr. Mariely Soto.)   Reason For Admission Cough   People in Home alone   Do you expect to return to your current living situation? Yes   Do you have help at home or someone to help you manage your care at home? Yes   Who are your caregiver(s) and their phone number(s)? Eliza/Son/[phone]   Current cognitive status: Alert/Oriented   Walking or Climbing Stairs Difficulty yes   Walking or Climbing Stairs ambulation difficulty, requires equipment   Mobility Management Ambulates with a Rollator   Dressing/Bathing Difficulty yes   Dressing/Bathing bathing difficulty, requires equipment   Home Accessibility stairs to enter home   Number of Stairs, Main Entrance none   Home Layout Able to live on 1st floor   Equipment Currently Used at Home rollator;shower chair;commode   Readmission within 30 days? No   Do you currently have service(s) that help you manage your care at home? Yes   Name and Contact number of agency Current with [facility]   Is the pt/caregiver preference to resume services with current agency Yes   Do you take prescription medications? Yes   Do you have prescription coverage? Yes   Do you have any problems affording any of your prescribed medications? No   Who is going to help you get home at discharge? Son   How do you get to doctors appointments? family or friend will provide   Are you on dialysis? No   Do you take coumadin? No   Discharge Plan A Skilled Nursing Facility   Discharge Plan B Home Health   Discharge Plan discussed with: Patient

## 2024-01-03 NOTE — PROGRESS NOTES
[facility]  Hospital Medicine Progress Note        CHIEF COMPLAINT   Cough (C/o cough worsening since yesterday with pulse ox reading at 86% on room air per Home Health just PTA. Denies SOB or known fever. O2 89% on RA in triage. 95% on 3L NC.)     HISTORY OF PRESENT ILLNESS:   Patient is a very pleasant 89-year-old female with past medical history as below including chronic kidney disease, hypertension, hyperlipidemia, anxiety, fibromyalgia, hypothyroidism who presented to the ER with complaints of worsening cough over the last 48 hours and a low home O2 reading per home health with home sat being 86% on room air.     She arrived to the ER requiring 3 L nasal cannula to maintain adequate sats, but afebrile hemodynamically stable.  Laboratory work showed a mild leukocytosis 14 K, a BNP of 840, a troponin of 1.9.  EKG showed sinus rhythm with RBBB.  Chest x-ray showed right-sided infiltrates.  She was seen by Cardiology in the ER and started on a heparin drip, she was given empiric antibiotics admitted to the hospitalist service for community-acquired pneumonia and NSTEMI.        Patient currently admitted for acute hypoxic respiratory failure due to adenovirus with bacterial coinfection pneumonia complicated by NSTEMI 1 versus 2, on heparin drip per CIS recommendations , plans for left heart catheterization     Today's information   Seen sitting in bedside chair and working with PT. Poor endurance. Both sons at bedside. Pt is pending [facility] tomorrow.      Exam  GENERAL: awake, alert, oriented and in no acute distress, non-toxic appearing   HEENT: normocephalic atraumatic   NECK: supple   LUNGS:  Bilateral wheezing/rhonchi, no wheezing or rales, no accessory muscle use   CVS: Regular rate and rhythm, normal peripheral perfusion  ABD: Soft, non-tender, non-distended, bowel sounds present  EXTREMITIES: no clubbing or cyanosis  SKIN: Warm, dry.   NEURO: alert and oriented, grossly without  focal deficits   PSYCHIATRIC: Cooperative        ASSESSMENT & PLAN:   Community-acquired pneumonia  Acute hypoxemic respiratory failure 2/2 above   NSTEMI, likely type 2 demand secondary to above   Leukocytosis , ???  Adenovirus infection  Cough   Atypical chest pain   Hypertension, controlled   Low magnesium levels   Grade 2 diastolic dysfunction        HX: Anxiety, fibromyalgia, HTN, hypothyroidism, hyperlipidemia      Plan  Patient continues to require 4 L of oxygen via nasal cannula despite antibiotics and IV Lasix   CTA chest was done with no pulmonary embolism reactive LN, recommend repeat CT in 3-6 m  Leukocytosis now resolved  Evaluated by pulmonology 1/3 recommend cardiac eval  Blood cultures negative at 96 hours   NPO after midnight for LHC tomorrow per family  Continue oxygen supplementation to goal SpO2 greater than 94%   Provide with incentive spirometry   Received ceftriaxone and azithromycin for 5 days   Completed Lasix 20 mg daily for 3 days     DVT prophylaxis: sc lovenox  Code status: full       VITAL SIGNS: 24 HRS MIN & MAX LAST   Temp  Min: 96.1 °F (35.6 °C)  Max: 98.8 °F (37.1 °C) 97.8 °F (36.6 °C)   BP  Min: 113/65  Max: 147/66 113/65   Pulse  Min: 64  Max: 84  80   Resp  Min: 17  Max: 20 18   SpO2  Min: 90 %  Max: 96 % 95 %     I have reviewed the following labs:  Recent Labs   Lab 01/01/24  0432 01/02/24 0325 01/03/24 0422   WBC 9.16  9.40 16.57* 10.75   RBC 4.11* 4.90 4.03*   HGB 12.9 15.3 12.8   HCT 38.0 45.9 37.5   MCV 92.5 93.7 93.1   MCH 31.4* 31.2* 31.8*   MCHC 33.9 33.3 34.1   RDW 11.9 11.9 11.9    295 328   MPV 9.6 10.0 9.1     Recent Labs   Lab 12/31/23  0351 01/01/24 0432 01/02/24 0325 01/02/24 2026 01/03/24 0422   * 130* 134*  --  135*   K 3.5 3.6 3.9  --  4.0   CO2 26 26 24  --  31   BUN 17.4 17.7 17.1  --  15.4   CREATININE 0.74 0.72 0.78  --  0.80   CALCIUM 7.3* 7.6* 8.4  --  7.7*   PH  --   --   --  7.650*  --    MG 2.00 1.80 1.80  --   --    ALBUMIN 2.6*  2.4* 3.0*  --   --    ALKPHOS 52 54 69  --   --    ALT 24 29 36  --   --    AST 43* 48* 47*  --   --    BILITOT 0.5 0.4 0.5  --   --      Microbiology Results (last 7 days)       Procedure Component Value Units Date/Time    Blood Culture [[id number]]  (Normal) Collected: 12/30/23 0012    Order Status: Completed Specimen: Blood, Venous Updated: 01/03/24 0101     CULTURE, BLOOD ([facility]) No Growth At 96 Hours    Blood Culture [[id number]]  (Normal) Collected: 12/30/23 0012    Order Status: Completed Specimen: Blood, Venous Updated: 01/03/24 0101     CULTURE, BLOOD ([facility]) No Growth At 96 Hours    Respiratory Culture [[id number]]     Order Status: Sent Specimen: Sputum, Expectorated              See below for Radiology    Scheduled Med:   albuterol-ipratropium  3 mL Nebulization Q6H    amLODIPine  10 mg Oral Daily    aspirin  81 mg Oral Daily    atorvastatin  40 mg Oral Daily    cinacalcet  30 mg Oral Daily    clopidogreL  75 mg Oral Daily    DULoxetine  60 mg Oral BID    enoxaparin  1 mg/kg Subcutaneous Q12H    gabapentin  300 mg Oral BID    guaiFENesin 100 mg/5 ml  200 mg Oral Q6H    levothyroxine  75 mcg Oral Daily    losartan  12.5 mg Oral Daily    metoprolol succinate  25 mg Oral BID    traZODone  100 mg Oral QHS    zinc oxide-cod liver oil   Topical (Top) BID      Continuous Infusions:     PRN Meds:  sodium chloride 0.9%, acetaminophen, acetaminophen, acetaminophen-codeine 300-30mg, melatonin, morphine, nitroGLYCERIN, sodium chloride 0.9%     Anticipated discharge and Disposition:   [facility] 1/3?      All diagnosis and differential diagnosis have been reviewed; assessment and plan has been documented; I have personally reviewed the labs and test results that are presently available; I have reviewed the patients medication list; I have reviewed the consulting providers response and recommendations. I have reviewed or attempted to review medical records based upon their availability    All of the patient's questions have been   addressed and answered. Patient's is agreeable to the above stated plan. I will continue to monitor closely and make adjustments to medical management as needed.  _____________________________________________________________________    Nutrition Status:    Radiology:  I have personally reviewed the following imaging and agree with the radiologist.     X-Ray Chest 1 View  Narrative: EXAMINATION:  XR CHEST 1 VIEW    CLINICAL HISTORY:  pneumonia, hypoxia;    TECHNIQUE:  AP chest    COMPARISON:  Chest x-ray dated 12/29/2023    FINDINGS:  The heart is normal in size.  There are increased interstitial markings in the right lung base, not significantly changed.  There is no new focal airspace consolidation.  There is no pleural effusion or visible pneumothorax.  Impression: No acute abnormality of the chest.    Electronically signed by: Kamilla Son  Date:    01/02/2024  Time:    19:40  Nuclear Stress - Cardiology Interpreted    Abnormal myocardial perfusion scan.    moderate intensity, moderate sized,  in the basal to apical   inferoapical wall(s) in the typical distribution of the RCA territory.    There are no other significant perfusion abnormalities.    The gated perfusion images showed an ejection fraction of 64% at rest.    The ECG portion of the study is abnormal but not diagnostic.      Thairy G Reyes,    01/03/2024

## 2024-01-03 NOTE — PROGRESS NOTES
[facility] 6th Floor Medical Telemetry  Wound Care    Patient Name:  Edwin Boyer   MRN:  [id number]  Date: 1/3/2024  Diagnosis: NSTEMI (non-ST elevated myocardial infarction)    History:     Past Medical History:   Diagnosis Date    Anxiety 6/6/2022    Carpal tunnel syndrome, bilateral     Chronic back pain     CKD (chronic kidney disease) stage 3, GFR 30-59 ml/min 6/6/2022    Fibromyalgia     GERD with esophagitis 6/6/2022    HTN (hypertension), benign 6/6/2022    Hyperlipidemia 6/6/2022    Hypothyroidism 6/6/2022    Insomnia 6/6/2022    OA (osteoarthritis) 6/6/2022    Osteoporosis     Primary hyperparathyroidism 2/8/2023    Urinary incontinence 8/8/2022       Social History     Socioeconomic History    Marital status:    Tobacco Use    Smoking status: Never    Smokeless tobacco: Never   Substance and Sexual Activity    Alcohol use: Never    Drug use: Never       Precautions:     Allergies as of 12/29/2023 - Reviewed 12/29/2023   Allergen Reaction Noted    Meperidine  06/06/2022    Sulfamethoxazole  06/06/2022       WOC Assessment Details/Treatment        01/03/24 1056   WOCN Assessment   Visit Date 01/03/24   Visit Time 1056   Consult Type New   Intervention assessed;chart review;orders   Teaching on-going   Skin Interventions   Device Skin Pressure Protection absorbent pad utilized/changed        Altered Skin Integrity 01/03/24 Coccyx Moisture associated dermatitis   Date First Assessed: 01/03/24   Altered Skin Integrity Present on Admission - Did Patient arrive to the hospital with altered skin?: yes  Location: Coccyx  Primary Wound Type: Moisture associated dermatitis   Wound Image    Dressing Appearance Open to air   Drainage Amount None   Drainage Characteristics/Odor No odor   Appearance Pink;Moist   Tissue loss description Partial thickness   Red (%), Wound Tissue Color 100 %   Periwound Area Moist;Redness   Wound Edges Irregular   Wound Length (cm) 0.5 cm   Wound Width (cm) 0.3  cm   Wound Surface Area (cm^2) 0.15 cm^2   Care Cleansed with:;Sterile normal saline   Dressing Applied     WOCN consulted for sacrum. Family at bedside. Educated patient and family on the importance of turning every 2 hours and using wedge and heel lift boots. She voiced understanding. Treatment recommendations put into place.  Sacrum: Cleanse with NS, dry well. Apply desitin,  abd pad, secure with medipore tape.  BID and PRN with soilage. Keep areas clean and dry, no adult briefs while in bed. Nursing to continue with turning every two hours, wedge, and floating heels.  Head of bed elevated, bed in lowest position, and call bell within reach. OSMANY mattress ordered. Will follow up.    01/03/2024

## 2024-01-03 NOTE — CONSULTS
Chief Complaint   Patient presents with    Cough     C/o cough worsening since yesterday with pulse ox reading at 86% on room air per Home Health just PTA. Denies SOB or known fever. O2 89% on RA in triage. 95% on 3L NC.       HPI  This is a 89 y.o. female unable to get her off her oxygen with a rising white cell count yesterday, I was asked to see about her.  Reviewed all the images and laboratory workup and discussed it with the primary service.  The report was she was on 4 L oxygen and by the early evening and I connected with the floor with a blood gas and chest x-ray was supposed to be for this morning but apparently the family and the patient who is very hard of hearing bargained to get everything needed to be done at the same time so the chest x-ray was already done and I got to review it.  She is just waking up and very hard of hearing so it was difficult to sustain a long-term conversation but we were able to do the essential interview and examination.    She is 89 year old admitted with hypoxic respiratory failure secondary to adenovirus on her admission workup complicated by bacterial coinfection as non-STEMI for which Cardiology is managing her with the heparin drip and possibility for a heart catheterization.  A nuclear stress test was performed yesterday with a reported abnormality in the RCA territory although an ejection fraction estimated to be 60 to 65%.    Past Medical History:   Diagnosis Date    Anxiety 6/6/2022    Carpal tunnel syndrome, bilateral     Chronic back pain     CKD (chronic kidney disease) stage 3, GFR 30-59 ml/min 6/6/2022    Fibromyalgia     GERD with esophagitis 6/6/2022    HTN (hypertension), benign 6/6/2022    Hyperlipidemia 6/6/2022    Hypothyroidism 6/6/2022    Insomnia 6/6/2022    OA (osteoarthritis) 6/6/2022    Osteoporosis     Primary hyperparathyroidism 2/8/2023    Urinary incontinence 8/8/2022       Past Surgical History:   Procedure Laterality Date    APPENDECTOMY        SECTION      COLONOSCOPY      HYSTERECTOMY      TOTAL KNEE ARTHROPLASTY         Social History     Social History Narrative    Not on file       Review of patient's allergies indicates:   Allergen Reactions    Meperidine      Other reaction(s): EXCITATIVE TYPE PSYCHOSIS    Sulfamethoxazole          Current Facility-Administered Medications:     0.9%  NaCl infusion, , Intravenous, PRN, Brendon Portillo MD, Stopped at 23 1658    acetaminophen tablet 650 mg, 650 mg, Oral, Q8H PRN, Evert Murillo MD, 650 mg at 24    acetaminophen tablet 650 mg, 650 mg, Oral, Q8H PRN, Evert Murillo MD    acetaminophen-codeine 300-30mg per tablet 1 tablet, 1 tablet, Oral, Daily PRN, Evert Murillo MD    albuterol-ipratropium 2.5 mg-0.5 mg/3 mL nebulizer solution 3 mL, 3 mL, Nebulization, Q6H, Warner Campos MD    amLODIPine tablet 10 mg, 10 mg, Oral, Daily, Evert Murillo MD, 10 mg at 24    aspirin chewable tablet 81 mg, 81 mg, Oral, Daily, Bakari Murillo ANP, 81 mg at 24    atorvastatin tablet 40 mg, 40 mg, Oral, Daily, Bakari Murillo ANP, 40 mg at 24    cinacalcet tablet 30 mg, 30 mg, Oral, Daily, Eevrt Murillo MD, 30 mg at 24    DULoxetine DR capsule 60 mg, 60 mg, Oral, BID, Evert Murillo MD, 60 mg at 24    enoxaparin injection 60 mg, 1 mg/kg, Subcutaneous, Q12H, Juan Luis Onofre NP, 60 mg at 24    gabapentin capsule 300 mg, 300 mg, Oral, BID, Evert Murillo MD, 300 mg at 24    guaiFENesin 100 mg/5 ml syrup 200 mg, 200 mg, Oral, Q6H, Josué Crooks MD, 200 mg at 24 1730    levothyroxine tablet 75 mcg, 75 mcg, Oral, Daily, Evert Murillo MD, 75 mcg at 24 0913    losartan split tablet 12.5 mg, 12.5 mg, Oral, Daily, Evert Murillo MD, 12.5 mg at 24 0900    melatonin tablet 6 mg, 6 mg, Oral, Nightly PRN, Evert Murillo MD     metoprolol succinate (TOPROL-XL) 24 hr tablet 25 mg, 25 mg, Oral, BID, Evert Murillo MD, 25 mg at 01/02/24 1952    morphine injection 2 mg, 2 mg, Intravenous, Q2H PRN, JemimaChris galdamezall Q., NP    nitroGLYCERIN SL tablet 0.4 mg, 0.4 mg, Sublingual, Q5 Min PRN, Jemima Juan Luis Q., NP    sodium chloride 0.9% flush 10 mL, 10 mL, Intravenous, PRN, Evert Murillo MD    traZODone tablet 100 mg, 100 mg, Oral, QHS, Evert Murillo MD, 100 mg at 01/02/24 1952    Respiratory ROS: positive for - shortness of breath and wheezing     Physical Exam  GENERAL: awake, alert, oriented and in no acute distress, non-toxic appearing very pleasant sweet old lady that is very hard of hearing  HEENT: normocephalic atraumatic   NECK: supple   LUNGS:  Bilateral very faint wheezing/rhonchi, no rales, no accessory muscle use   CVS: Regular rate and rhythm, normal peripheral perfusion  ABD: Soft, non-tender, non-distended, bowel sounds present  EXTREMITIES: no clubbing or cyanosis  SKIN: Warm, dry.   NEURO: alert and oriented, grossly without focal deficits   PSYCHIATRIC: Cooperative    Vitals:    01/02/24 2324 01/03/24 0000 01/03/24 0241 01/03/24 0400   BP: (!) 147/66  134/76    Pulse: 71 69 66 68   Resp: 18  17    Temp: 97.5 °F (36.4 °C)  97.6 °F (36.4 °C)    TempSrc: Axillary  Axillary    SpO2: (!) 94% (!) 93% (!) 93% 96%   Weight:       Height:           Recent Results (from the past 24 hour(s))   Nuclear Stress - Cardiology Interpreted    Collection Time: 01/02/24  1:06 PM   Result Value Ref Range    85% Max Predicted      Max Predicted      [facility] CV CPX PATIENT IS MALE 0.0     [facility] CV CPX PATIENT IS FEMALE 1.0     Nuc Rest EF 64    RT Blood Gas    Collection Time: 01/02/24  8:26 PM   Result Value Ref Range    Sample Type Arterial Blood     Sample site Right Radial Artery     Drawn by damir shima rrt     pH, Blood gas 7.650 (HH) 7.350 - 7.450    pCO2, Blood gas 27.0 (L) 35.0 - 45.0 mmHg    pO2, Blood gas  129.0 (H) 80.0 - 100.0 mmHg    Sodium, Blood Gas 122 (L) 137 - 145 mmol/L    Potassium, Blood Gas 3.9 3.5 - 5.0 mmol/L    Calcium Level Ionized 0.94 (L) 1.12 - 1.23 mmol/L    TOC2, Blood gas 30.5 mmol/L    Base Excess, Blood gas 9.40 (H) -2.00 - 2.00 mmol/L    sO2, Blood gas 99.4 %    HCO3, Blood gas 29.7 (H) 22.0 - 26.0 mmol/L    THb, Blood gas 12.8 12 - 16 g/dL    O2 Hb, Blood Gas 96.2 94.0 - 97.0 %    CO Hgb 0.4 (L) 0.5 - 1.5 %    Met Hgb 1.0 0.4 - 1.5 %    Oxygen Device, Blood gas Cannula     LPM 4    Basic Metabolic Panel    Collection Time: 01/03/24  4:22 AM   Result Value Ref Range    Sodium Level 135 (L) 136 - 145 mmol/L    Potassium Level 4.0 3.5 - 5.1 mmol/L    Chloride 94 (L) 98 - 107 mmol/L    Carbon Dioxide 31 23 - 31 mmol/L    Glucose Level 94 82 - 115 mg/dL    Blood Urea Nitrogen 15.4 9.8 - 20.1 mg/dL    Creatinine 0.80 0.55 - 1.02 mg/dL    BUN/Creatinine Ratio 19     Calcium Level Total 7.7 (L) 8.4 - 10.2 mg/dL    Anion Gap 10.0 mEq/L    eGFR >60 mls/min/1.73/m2   CBC with Differential    Collection Time: 01/03/24  4:22 AM   Result Value Ref Range    WBC 10.75 4.50 - 11.50 x10(3)/mcL    RBC 4.03 (L) 4.20 - 5.40 x10(6)/mcL    Hgb 12.8 12.0 - 16.0 g/dL    Hct 37.5 37.0 - 47.0 %    MCV 93.1 80.0 - 94.0 fL    MCH 31.8 (H) 27.0 - 31.0 pg    MCHC 34.1 33.0 - 36.0 g/dL    RDW 11.9 11.5 - 17.0 %    Platelet 328 130 - 400 x10(3)/mcL    MPV 9.1 7.4 - 10.4 fL    Neut % 71.9 %    Lymph % 16.6 %    Mono % 8.0 %    Eos % 2.3 %    Basophil % 0.3 %    Lymph # 1.78 0.6 - 4.6 x10(3)/mcL    Neut # 7.73 2.1 - 9.2 x10(3)/mcL    Mono # 0.86 0.1 - 1.3 x10(3)/mcL    Eos # 0.25 0 - 0.9 x10(3)/mcL    Baso # 0.03 <=0.2 x10(3)/mcL    IG# 0.10 (H) 0 - 0.04 x10(3)/mcL    IG% 0.9 %    NRBC% 0.0 %     Chest x-ray from late last night was reviewed: Was not so impressive in terms the amount of an infiltrative or fluid status but she did have some remnants of a right-sided sketchy infiltrate.  Should be noted that her CT scan from few  days ago on new year's Debbie day had significant right lower lobe infiltrate to my assessment and no PE.    ASSESSMENT  1. Hypoxic respiratory failure in the setting a pneumonia on the right side with a rising white cell count yesterday but it seemed like it reverted back to normal.  I do not think there is a point of adjusting the antibiotics although at this age and swallowing concern one may want to switch to Zosyn?    2. Community-acquired pneumonia  3. NSTEMI, likely type 2 demand secondary to above   Leukocytosis , resolved as of today  Adenovirus infection  Cough       PLAN  It seems like her ABGs could have allowed us to wean the FiO2 some more but she is back on 4 L. she is somewhat wheezy so have her neb treatment on a regular basis.  Not really sure if there is a substantial amount of underlying COPD with the lung exam or this is just from poor inspiratory effort because of her hearing impairment in following instructions.  It seems the next step would be the cardiac level of intervention.  We will defer to Cardiology to take into consideration her age and the fact that she is started recovering from a pulmonary event that could be by itself life deterring.  Luckily she is turning around.  We will have the attending who knows her baseline the past couple of days reassess later in the day and we will communicate as to what to be adjusting in her management otherwise.  Slow weaning on the oxygen level she is at as tolerated.      Warner Campos

## 2024-01-04 LAB
ANION GAP SERPL CALC-SCNC: 11 MEQ/L
BACTERIA BLD CULT: NORMAL
BACTERIA BLD CULT: NORMAL
BASOPHILS # BLD AUTO: 0.03 X10(3)/MCL
BASOPHILS NFR BLD AUTO: 0.3 %
BUN SERPL-MCNC: 15.6 MG/DL (ref 9.8–20.1)
CALCIUM SERPL-MCNC: 8 MG/DL (ref 8.4–10.2)
CHLORIDE SERPL-SCNC: 96 MMOL/L (ref 98–107)
CO2 SERPL-SCNC: 28 MMOL/L (ref 23–31)
CREAT SERPL-MCNC: 0.81 MG/DL (ref 0.55–1.02)
CREAT/UREA NIT SERPL: 19
EOSINOPHIL # BLD AUTO: 0.25 X10(3)/MCL (ref 0–0.9)
EOSINOPHIL NFR BLD AUTO: 2.7 %
ERYTHROCYTE [DISTWIDTH] IN BLOOD BY AUTOMATED COUNT: 12.1 % (ref 11.5–17)
GFR SERPLBLD CREATININE-BSD FMLA CKD-EPI: >60 MLS/MIN/1.73/M2
GLUCOSE SERPL-MCNC: 90 MG/DL (ref 82–115)
HCT VFR BLD AUTO: 37.2 % (ref 37–47)
HGB BLD-MCNC: 12.3 G/DL (ref 12–16)
IMM GRANULOCYTES # BLD AUTO: 0.13 X10(3)/MCL (ref 0–0.04)
IMM GRANULOCYTES NFR BLD AUTO: 1.4 %
LYMPHOCYTES # BLD AUTO: 1.97 X10(3)/MCL (ref 0.6–4.6)
LYMPHOCYTES NFR BLD AUTO: 21.6 %
MAGNESIUM SERPL-MCNC: 1.7 MG/DL (ref 1.6–2.6)
MCH RBC QN AUTO: 31.5 PG (ref 27–31)
MCHC RBC AUTO-ENTMCNC: 33.1 G/DL (ref 33–36)
MCV RBC AUTO: 95.1 FL (ref 80–94)
MONOCYTES # BLD AUTO: 0.95 X10(3)/MCL (ref 0.1–1.3)
MONOCYTES NFR BLD AUTO: 10.4 %
NEUTROPHILS # BLD AUTO: 5.78 X10(3)/MCL (ref 2.1–9.2)
NEUTROPHILS NFR BLD AUTO: 63.6 %
NRBC BLD AUTO-RTO: 0 %
PLATELET # BLD AUTO: 309 X10(3)/MCL (ref 130–400)
PMV BLD AUTO: 9.1 FL (ref 7.4–10.4)
POTASSIUM SERPL-SCNC: 4 MMOL/L (ref 3.5–5.1)
RBC # BLD AUTO: 3.91 X10(6)/MCL (ref 4.2–5.4)
SODIUM SERPL-SCNC: 135 MMOL/L (ref 136–145)
WBC # SPEC AUTO: 9.11 X10(3)/MCL (ref 4.5–11.5)

## 2024-01-04 PROCEDURE — 63600175 PHARM REV CODE 636 W HCPCS: Performed by: NURSE PRACTITIONER

## 2024-01-04 PROCEDURE — 25000003 PHARM REV CODE 250: Performed by: STUDENT IN AN ORGANIZED HEALTH CARE EDUCATION/TRAINING PROGRAM

## 2024-01-04 PROCEDURE — 25000003 PHARM REV CODE 250: Performed by: NURSE PRACTITIONER

## 2024-01-04 PROCEDURE — 25000242 PHARM REV CODE 250 ALT 637 W/ HCPCS: Performed by: INTERNAL MEDICINE

## 2024-01-04 PROCEDURE — 93005 ELECTROCARDIOGRAM TRACING: CPT

## 2024-01-04 PROCEDURE — 93458 L HRT ARTERY/VENTRICLE ANGIO: CPT | Mod: 59 | Performed by: STUDENT IN AN ORGANIZED HEALTH CARE EDUCATION/TRAINING PROGRAM

## 2024-01-04 PROCEDURE — 27000221 HC OXYGEN, UP TO 24 HOURS

## 2024-01-04 PROCEDURE — C1725 CATH, TRANSLUMIN NON-LASER: HCPCS | Performed by: STUDENT IN AN ORGANIZED HEALTH CARE EDUCATION/TRAINING PROGRAM

## 2024-01-04 PROCEDURE — 63600175 PHARM REV CODE 636 W HCPCS: Performed by: STUDENT IN AN ORGANIZED HEALTH CARE EDUCATION/TRAINING PROGRAM

## 2024-01-04 PROCEDURE — 25000003 PHARM REV CODE 250: Performed by: INTERNAL MEDICINE

## 2024-01-04 PROCEDURE — 27201423 OPTIME MED/SURG SUP & DEVICES STERILE SUPPLY: Performed by: STUDENT IN AN ORGANIZED HEALTH CARE EDUCATION/TRAINING PROGRAM

## 2024-01-04 PROCEDURE — C1874 STENT, COATED/COV W/DEL SYS: HCPCS | Performed by: STUDENT IN AN ORGANIZED HEALTH CARE EDUCATION/TRAINING PROGRAM

## 2024-01-04 PROCEDURE — 92978 ENDOLUMINL IVUS OCT C 1ST: CPT | Performed by: STUDENT IN AN ORGANIZED HEALTH CARE EDUCATION/TRAINING PROGRAM

## 2024-01-04 PROCEDURE — C1760 CLOSURE DEV, VASC: HCPCS | Performed by: STUDENT IN AN ORGANIZED HEALTH CARE EDUCATION/TRAINING PROGRAM

## 2024-01-04 PROCEDURE — 99900035 HC TECH TIME PER 15 MIN (STAT)

## 2024-01-04 PROCEDURE — 027035Z DILATION OF CORONARY ARTERY, ONE ARTERY WITH TWO DRUG-ELUTING INTRALUMINAL DEVICES, PERCUTANEOUS APPROACH: ICD-10-PCS | Performed by: STUDENT IN AN ORGANIZED HEALTH CARE EDUCATION/TRAINING PROGRAM

## 2024-01-04 PROCEDURE — 21400001 HC TELEMETRY ROOM

## 2024-01-04 PROCEDURE — 93010 ELECTROCARDIOGRAM REPORT: CPT | Mod: ,,, | Performed by: INTERNAL MEDICINE

## 2024-01-04 PROCEDURE — 94640 AIRWAY INHALATION TREATMENT: CPT

## 2024-01-04 PROCEDURE — 85025 COMPLETE CBC W/AUTO DIFF WBC: CPT | Performed by: STUDENT IN AN ORGANIZED HEALTH CARE EDUCATION/TRAINING PROGRAM

## 2024-01-04 PROCEDURE — C1769 GUIDE WIRE: HCPCS | Performed by: STUDENT IN AN ORGANIZED HEALTH CARE EDUCATION/TRAINING PROGRAM

## 2024-01-04 PROCEDURE — 99152 MOD SED SAME PHYS/QHP 5/>YRS: CPT | Performed by: STUDENT IN AN ORGANIZED HEALTH CARE EDUCATION/TRAINING PROGRAM

## 2024-01-04 PROCEDURE — 4A023N7 MEASUREMENT OF CARDIAC SAMPLING AND PRESSURE, LEFT HEART, PERCUTANEOUS APPROACH: ICD-10-PCS | Performed by: STUDENT IN AN ORGANIZED HEALTH CARE EDUCATION/TRAINING PROGRAM

## 2024-01-04 PROCEDURE — 63600175 PHARM REV CODE 636 W HCPCS: Performed by: INTERNAL MEDICINE

## 2024-01-04 PROCEDURE — B2111ZZ FLUOROSCOPY OF MULTIPLE CORONARY ARTERIES USING LOW OSMOLAR CONTRAST: ICD-10-PCS | Performed by: STUDENT IN AN ORGANIZED HEALTH CARE EDUCATION/TRAINING PROGRAM

## 2024-01-04 PROCEDURE — C1894 INTRO/SHEATH, NON-LASER: HCPCS | Performed by: STUDENT IN AN ORGANIZED HEALTH CARE EDUCATION/TRAINING PROGRAM

## 2024-01-04 PROCEDURE — 25500020 PHARM REV CODE 255: Performed by: STUDENT IN AN ORGANIZED HEALTH CARE EDUCATION/TRAINING PROGRAM

## 2024-01-04 PROCEDURE — C1887 CATHETER, GUIDING: HCPCS | Performed by: STUDENT IN AN ORGANIZED HEALTH CARE EDUCATION/TRAINING PROGRAM

## 2024-01-04 PROCEDURE — 94761 N-INVAS EAR/PLS OXIMETRY MLT: CPT

## 2024-01-04 PROCEDURE — 99153 MOD SED SAME PHYS/QHP EA: CPT | Performed by: STUDENT IN AN ORGANIZED HEALTH CARE EDUCATION/TRAINING PROGRAM

## 2024-01-04 PROCEDURE — 83735 ASSAY OF MAGNESIUM: CPT | Performed by: STUDENT IN AN ORGANIZED HEALTH CARE EDUCATION/TRAINING PROGRAM

## 2024-01-04 PROCEDURE — 80048 BASIC METABOLIC PNL TOTAL CA: CPT | Performed by: STUDENT IN AN ORGANIZED HEALTH CARE EDUCATION/TRAINING PROGRAM

## 2024-01-04 PROCEDURE — C9600 PERC DRUG-EL COR STENT SING: HCPCS | Mod: LD | Performed by: STUDENT IN AN ORGANIZED HEALTH CARE EDUCATION/TRAINING PROGRAM

## 2024-01-04 PROCEDURE — C1753 CATH, INTRAVAS ULTRASOUND: HCPCS | Performed by: STUDENT IN AN ORGANIZED HEALTH CARE EDUCATION/TRAINING PROGRAM

## 2024-01-04 DEVICE — ANGIO-SEAL VIP VASCULAR CLOSURE DEVICE
Type: IMPLANTABLE DEVICE | Site: GROIN | Status: FUNCTIONAL
Brand: ANGIO-SEAL

## 2024-01-04 DEVICE — EVEROLIMUS-ELUTING PLATINUM CHROMIUM CORONARY STENT SYSTEM
Type: IMPLANTABLE DEVICE | Site: CORONARY | Status: FUNCTIONAL
Brand: SYNERGY™ XD

## 2024-01-04 RX ORDER — LIDOCAINE HYDROCHLORIDE 10 MG/ML
INJECTION, SOLUTION EPIDURAL; INFILTRATION; INTRACAUDAL; PERINEURAL
Status: DISCONTINUED | OUTPATIENT
Start: 2024-01-04 | End: 2024-01-04 | Stop reason: HOSPADM

## 2024-01-04 RX ORDER — HYDRALAZINE HYDROCHLORIDE 20 MG/ML
10 INJECTION INTRAMUSCULAR; INTRAVENOUS EVERY 4 HOURS PRN
Status: DISCONTINUED | OUTPATIENT
Start: 2024-01-04 | End: 2024-01-08 | Stop reason: HOSPADM

## 2024-01-04 RX ORDER — MIDAZOLAM HYDROCHLORIDE 1 MG/ML
INJECTION, SOLUTION INTRAMUSCULAR; INTRAVENOUS
Status: DISCONTINUED | OUTPATIENT
Start: 2024-01-04 | End: 2024-01-04 | Stop reason: HOSPADM

## 2024-01-04 RX ORDER — SODIUM CHLORIDE 0.9 % (FLUSH) 0.9 %
10 SYRINGE (ML) INJECTION
Status: DISCONTINUED | OUTPATIENT
Start: 2024-01-04 | End: 2024-01-08 | Stop reason: HOSPADM

## 2024-01-04 RX ORDER — ONDANSETRON 4 MG/1
8 TABLET, ORALLY DISINTEGRATING ORAL EVERY 8 HOURS PRN
Status: DISCONTINUED | OUTPATIENT
Start: 2024-01-04 | End: 2024-01-08 | Stop reason: HOSPADM

## 2024-01-04 RX ORDER — HEPARIN SODIUM 1000 [USP'U]/ML
INJECTION, SOLUTION INTRAVENOUS; SUBCUTANEOUS
Status: DISCONTINUED | OUTPATIENT
Start: 2024-01-04 | End: 2024-01-04 | Stop reason: HOSPADM

## 2024-01-04 RX ORDER — FENTANYL CITRATE 50 UG/ML
INJECTION, SOLUTION INTRAMUSCULAR; INTRAVENOUS
Status: DISCONTINUED | OUTPATIENT
Start: 2024-01-04 | End: 2024-01-04 | Stop reason: HOSPADM

## 2024-01-04 RX ADMIN — ASPIRIN 81 MG CHEWABLE TABLET 81 MG: 81 TABLET CHEWABLE at 09:01

## 2024-01-04 RX ADMIN — CINACALCET 30 MG: 30 TABLET, FILM COATED ORAL at 09:01

## 2024-01-04 RX ADMIN — LOSARTAN POTASSIUM 12.5 MG: 25 TABLET, FILM COATED ORAL at 09:01

## 2024-01-04 RX ADMIN — AMLODIPINE BESYLATE 10 MG: 5 TABLET ORAL at 09:01

## 2024-01-04 RX ADMIN — METOPROLOL SUCCINATE 25 MG: 25 TABLET, EXTENDED RELEASE ORAL at 07:01

## 2024-01-04 RX ADMIN — ATORVASTATIN CALCIUM 40 MG: 40 TABLET, FILM COATED ORAL at 09:01

## 2024-01-04 RX ADMIN — DULOXETINE HYDROCHLORIDE 60 MG: 30 CAPSULE, DELAYED RELEASE ORAL at 07:01

## 2024-01-04 RX ADMIN — CLOPIDOGREL BISULFATE 75 MG: 75 TABLET ORAL at 09:01

## 2024-01-04 RX ADMIN — GUAIFENESIN 200 MG: 200 SOLUTION ORAL at 06:01

## 2024-01-04 RX ADMIN — GABAPENTIN 300 MG: 300 CAPSULE ORAL at 07:01

## 2024-01-04 RX ADMIN — IPRATROPIUM BROMIDE AND ALBUTEROL SULFATE 3 ML: 2.5; .5 SOLUTION RESPIRATORY (INHALATION) at 09:01

## 2024-01-04 RX ADMIN — METOPROLOL SUCCINATE 25 MG: 25 TABLET, EXTENDED RELEASE ORAL at 09:01

## 2024-01-04 RX ADMIN — DULOXETINE HYDROCHLORIDE 60 MG: 30 CAPSULE, DELAYED RELEASE ORAL at 09:01

## 2024-01-04 RX ADMIN — GABAPENTIN 300 MG: 300 CAPSULE ORAL at 09:01

## 2024-01-04 RX ADMIN — IPRATROPIUM BROMIDE AND ALBUTEROL SULFATE 3 ML: 2.5; .5 SOLUTION RESPIRATORY (INHALATION) at 07:01

## 2024-01-04 RX ADMIN — IPRATROPIUM BROMIDE AND ALBUTEROL SULFATE 3 ML: 2.5; .5 SOLUTION RESPIRATORY (INHALATION) at 12:01

## 2024-01-04 RX ADMIN — Medication: at 07:01

## 2024-01-04 RX ADMIN — TRAZODONE HYDROCHLORIDE 100 MG: 100 TABLET ORAL at 07:01

## 2024-01-04 RX ADMIN — ENOXAPARIN SODIUM 60 MG: 80 INJECTION SUBCUTANEOUS at 07:01

## 2024-01-04 RX ADMIN — LEVOTHYROXINE SODIUM 75 MCG: 25 TABLET ORAL at 09:01

## 2024-01-04 NOTE — PT/OT/SLP PROGRESS
Physical Therapy      Patient Name:  Edwin Boyer   MRN:  [id number]    Patient not seen today secondary to  off floor for [facility]. Will follow-up as appropriate.

## 2024-01-04 NOTE — PROGRESS NOTES
Inpatient Nutrition Evaluation    Admit Date: 12/29/2023   Total duration of encounter: 6 days   Patient Age: 89 y.o.    Nutrition Recommendation/Prescription     Advance diet as tolerated to regular diet  Continue Boost BID to provide 240 kcal and 10 g protein per serving  RD to monitor po intake and weight    Nutrition Assessment     Chart Review    Reason Seen: length of stay    Malnutrition Screening Tool Results   Have you recently lost weight without trying?: No  Have you been eating poorly because of a decreased appetite?: No   MST Score: 0   Diagnosis:  NSTEMI - Type Unspecified   CAP  Acute hypoxemic respiratory failure   Leukocytosis - Resolved   Adenovirus infection   Cough   CP - Atypical     Relevant Medical History: Anxiety, Carpel Tunnel Syndrome Bilaterally, CKD Stage III, Fibromyalgia, GERD, HTN, HLD, Hypothyroidism, OA, Osteoporosis, Hyperparathyroidism, Urinary Incontinence, VHD (Mod MR)     Scheduled Medications:  albuterol-ipratropium, 3 mL, Q6H  amLODIPine, 10 mg, Daily  aspirin, 81 mg, Daily  atorvastatin, 40 mg, Daily  cinacalcet, 30 mg, Daily  clopidogreL, 75 mg, Daily  DULoxetine, 60 mg, BID  enoxaparin, 1 mg/kg, Q12H  gabapentin, 300 mg, BID  levothyroxine, 75 mcg, Daily  losartan, 12.5 mg, Daily  metoprolol succinate, 25 mg, BID  traZODone, 100 mg, QHS  zinc oxide-cod liver oil, , BID    Continuous Infusions:   PRN Medications: sodium chloride 0.9%, acetaminophen, acetaminophen, acetaminophen-codeine 300-30mg, diclofenac sodium, melatonin, morphine, nitroGLYCERIN, sodium chloride 0.9%, sodium chloride 0.9%    Recent Labs   Lab 12/29/23  1215 12/29/23  1216 12/29/23  1216 12/30/23  0727 12/31/23  0351 01/01/24  0432 01/02/24  0325 01/03/24  0422 01/04/24  0321   *  --   --  131* 130* 130* 134* 135* 135*   K 3.6  --   --  3.6 3.5 3.6 3.9 4.0 4.0   CALCIUM 8.1*  --   --  7.6* 7.3* 7.6* 8.4 7.7* 8.0*   MG  --   --   --  1.40* 2.00 1.80 1.80  --  1.70   CHLORIDE 95*  --   --  97* 94* 96*  "95* 94* 96*   CO2 24  --   --  23 26 26 24 31 28   BUN 18.5  --   --  17.2 17.4 17.7 17.1 15.4 15.6   CREATININE 0.86  --   --  0.70 0.74 0.72 0.78 0.80 0.81   EGFRNORACEVR >60  --   --  >60 >60 >60 >60 >60 >60   GLUCOSE 141*  --   --  93 99 90 91 94 90   BILITOT 0.8  --   --  0.6 0.5 0.4 0.5  --   --    ALKPHOS 52  --   --  51 52 54 69  --   --    ALT 13  --   --  13 24 29 36  --   --    AST 28  --   --  32 43* 48* 47*  --   --    ALBUMIN 3.4  --   --  2.9* 2.6* 2.4* 3.0*  --   --    TRIG  --   --   --   --  155*  --   --   --   --    WBC  --  14.70*   < > 12.43* 11.21  11.21 9.16  9.40 16.57* 10.75 9.11   HGB  --  15.0  --  14.7 14.2 12.9 15.3 12.8 12.3   HCT  --  43.0  --  42.6 41.6 38.0 45.9 37.5 37.2    < > = values in this interval not displayed.     Nutrition Orders:  Diet NPO  Dietary nutrition supplements Boost Vanilla; BID    Appetite/Oral Intake: good/% of meals  Factors Affecting Nutritional Intake: none identified  Food/Buddhism/Cultural Preferences: none reported  Food Allergies: no known food allergies  Last Bowel Movement: 24  Wound(s):     Altered Skin Integrity 24 Coccyx Moisture associated dermatitis-Tissue loss description: Partial thickness     Comments    : Pt NPO today for LHC. Son reports good appetite, improved, and drinking ONS BID. Denies GI complaints. Good appetite prior admission, drinks ONS at home. Denies unintentional weight loss. Weight appears stable per EMR weights.    Anthropometrics    Height: 5' 1" (154.9 cm), Height Method: Stated  Last Weight: 64.4 kg (142 lb) (23 1154), Weight Method: Standard Scale  BMI (Calculated): 26.8  BMI Classification: overweight (BMI 25-29.9)     Ideal Body Weight (IBW), Female: 105 lb     % Ideal Body Weight, Female (lb): 135.24 %                    Usual Body Weight (UBW), k.6 kg  % Usual Body Weight: 101.49     Usual Weight Provided By: EMR weight history and patient denies unintentional weight loss    Wt " Readings from Last 5 Encounters:   12/29/23 64.4 kg (142 lb)   12/19/23 65.4 kg (144 lb 1.6 oz)   11/13/23 62.6 kg (138 lb)   10/07/23 62.6 kg (138 lb)   09/18/23 63 kg (138 lb 14.4 oz)     Weight Change(s) Since Admission:   Wt Readings from Last 1 Encounters:   12/29/23 1154 64.4 kg (142 lb)   Admit Weight: 64.4 kg (142 lb) (12/29/23 1154), Weight Method: Stated    Patient Education     Not applicable.    Nutrition Goals & Monitoring     Dietitian will monitor: food and beverage intake, weight, electrolyte/renal panel, glucose/endocrine profile, and gastrointestinal profile    Nutrition Risk/Follow-Up: low (follow-up in 5-7 days)  Patients assigned 'low nutrition risk' status do not qualify for a full nutritional assessment but will be monitored and re-evaluated in a 5-7 day time period. Please consult if re-evaluation needed sooner.

## 2024-01-04 NOTE — CONSULTS
Consults    Palliative Care Team attempted to see patient to discuss goals of care, patient was leaving the floor to go have a procedure completed. Palliative care team will follow up to discuss goals of care.

## 2024-01-04 NOTE — PROGRESS NOTES
"    [facility]    Card[facility]  Progress Note    Patient Name: Edwin Boyer  MRN: [id number]  Admission Date: 12/29/2023  Hospital Length of Stay: 6 days  Code Status: Full Code   Attending Provider: Josué Crooks MD   Consulting Provider: Heaven Hayward NP  Primary Care Physician: Mariely Soto MD  Principal Problem:NSTEMI (non-ST elevated myocardial infarction)    Patient information was obtained from patient, past medical records, and ER records.     Subjective:     Reason for Consult: Elevated Troponin      HPI: Ms. Boyer is an 89 y/o female who is known to [facility], Dr. Guidry. The patient presented to [facility] ER on 12.29.23 with c/o a Cough. She reported that about 2 days prior she developed a cough which progressively worsened and her Pulse Oxymetry at Home was 86% on RA. She denied CP, Fever, Chills and SOB. Reported Mild SOB at Rest. Secondary to these findings and symptoms she presented to the ER for evaluation. Upon Evaluation in the ER she was found to have an RA O2 saturation in the Low 80s and began to endorse CP with a Cough. The CP was not presented unless she was coughing. The Pain was 8/10 on a verbal scale with coughing and described as a "sharp pain". She reported that the cough was productive and yielded a yellow/green sputum. In the ER she was found to have a WBC 14.70, Na 130, K 3.6, Cl 95, .2, Troponin 1.936, and Negative Flu A/B and COVID Screenings. She was started on IV Abx and ASA 325mg x 1. CIS has been consulted for NSTEMI in the Setting of Pneumonia.     Hospital Course:   12.30.23: NAD. "I am ok." Denies CP and SOB.   12.31.23: NAD. "I am feeling better today." Denies CP and SOB. VSS.  1.1.24: NAD. "I am good." Denies CP, SOB and Palps. VSS. Na 130.  1.2.24: NAD, VSS. Family wishes to proceed with Stress test. She denies SOB or CP  1.3.24: NAD, She denies SOB, CP, or palpitations. Family is reluctant to proceed with [facility] at this time, will elect for medical " management.   23: NAD, VSS. Patient denies SOB or CP. Family is now willing to proceed with [facility] after discussion with pulmonary.     PMH: Anxiety, Carpel Tunnel Syndrome Bilaterally, CKD Stage III, Fibromyalgia, GERD, HTN, HLD, Hypothyroidism, OA, Osteoporosis, Hyperparathyroidism, Urinary Incontinence, VHD (Mod MR)  PSH: Appendectomy, , Colonoscopy, EMERSON, TKA, Breast Biopsy,   Family History: Father, D, H; Mother, D, HF  Social History: Denies Illicit Drug, ETOH and Tobacco Use    Previous Cardiac Diagnostics:   SPECT 23:  Abnormal myocardial perfusion scan.  moderate intensity, moderate sized,  in the basal to apical inferoapical wall(s) in the typical distribution of the RCA territory.  There are no other significant perfusion abnormalities.  The gated perfusion images showed an ejection fraction of 64% at rest.  The ECG portion of the study is abnormal but not diagnostic.    ECHO 23:  Left Ventricle: The left ventricle is normal in size. Normal wall thickness. Normal wall motion. Mid septal wall is slightly hypokinetic. There is normal systolic function with a visually estimated ejection fraction of 55 - 60%. Grade II diastolic dysfunction.  Right Ventricle: Normal right ventricular cavity size. Systolic function is normal.  Left Atrium: Left atrium is dilated.  Aortic Valve: The aortic valve is a trileaflet valve. There is moderate aortic valve sclerosis. Mildly calcified cusps. There is no stenosis. Aortic valve peak velocity is 1.55 m/s. Mean gradient is 5 mmHg. There is mild aortic regurgitation.  Mitral Valve: Moderately thickened leaflets. There is mild mitral annular calcification present. There is mild stenosis. The mean pressure gradient across the mitral valve is 5 mmHg at a heart rate of 79 bpm. There is mild regurgitation.  Tricuspid Valve: There is no stenosis. There is mild regurgitation.  Pulmonic Valve: There is no stenosis.    ECHO 5.3.23:  The study quality is average.    The left ventricle is normal in size. Global left ventricular systolic function is normal. The left ventricular ejection fraction is 60%. Left ventricular diastolic function is normal. Mild asymmetric septal left ventricular hypertrophy is present.  The left atrial diameter is mildly increased.  Moderate (2+) mitral regurgitation. PISA is 0.54 cm. The MR volume color doppler method is 20 ml. EROA measures 0.1 cm². Vena contracta is 4.5 mm.   Mild (1+) tricuspid regurgitation. Mild (1+) aortic regurgitation. Mild (1+) pulmonic regurgitation.   The estimated pulmonary artery systolic pressure is 39 mmHg assuming a right atrial pressure of 3 mmHg.     Calcium Score 5.18.18:  Total Calcium Score 299    MPI 10.26.11:  This is a normal perfusion study, no perfusion defects noted.   This scan is suggestive of low risk for future cardiovascular events.  The left ventricular cavity is noted to be normal on the stress study. The left ventricular ejection fraction was calculated to be 74% and left ventricular global function is normal.   The study quality is good.    Review of patient's allergies indicates:   Allergen Reactions    Meperidine      Other reaction(s): EXCITATIVE TYPE PSYCHOSIS    Sulfamethoxazole      No current facility-administered medications on file prior to encounter.     Current Outpatient Medications on File Prior to Encounter   Medication Sig    acetaminophen-codeine 300-30mg (TYLENOL #3) 300-30 mg Tab Take 1 tablet by mouth daily as needed (pain).    amLODIPine (NORVASC) 10 MG tablet Take 1 tablet (10 mg total) by mouth once daily.    cinacalcet (SENSIPAR) 30 MG Tab Take 1 tablet (30 mg total) by mouth once daily.    DULoxetine (CYMBALTA) 60 MG capsule Take 1 capsule (60 mg total) by mouth every morning. (Patient taking differently: Take 60 mg by mouth 2 (two) times daily.)    gabapentin (NEURONTIN) 300 MG capsule Take 1 capsule (300 mg total) by mouth 2 (two) times daily.    levothyroxine (SYNTHROID)  75 MCG tablet Take 1 tablet (75 mcg total) by mouth once daily.    losartan (COZAAR) 25 MG tablet Take 0.5 tablets (12.5 mg total) by mouth once daily.    metoprolol succinate (TOPROL-XL) 25 MG 24 hr tablet Take 1 tablet (25 mg total) by mouth 2 (two) times daily.    pravastatin (PRAVACHOL) 20 MG tablet Take 20 mg by mouth once daily.    traZODone (DESYREL) 100 MG tablet Take 1 tablet (100 mg total) by mouth every evening.     Review of Systems   Constitutional:  Negative for chills and fever.   Respiratory:  Negative for shortness of breath.    Cardiovascular:  Negative for chest pain, palpitations and leg swelling.   Psychiatric/Behavioral:  Positive for confusion.    All other systems reviewed and are negative.    Objective:     Vital Signs (Most Recent):  Temp: 98.2 °F (36.8 °C) (01/04/24 0728)  Pulse: 79 (01/04/24 0750)  Resp: (!) 23 (01/04/24 0750)  BP: (!) 143/74 (01/04/24 0728)  SpO2: (!) 93 % (01/04/24 0750) Vital Signs (24h Range):  Temp:  [97.6 °F (36.4 °C)-99.2 °F (37.3 °C)] 98.2 °F (36.8 °C)  Pulse:  [64-80] 79  Resp:  [16-23] 23  SpO2:  [90 %-96 %] 93 %  BP: (112-143)/(60-74) 143/74     Weight: 64.4 kg (142 lb)  Body mass index is 26.83 kg/m².    SpO2: (!) 93 %         Intake/Output Summary (Last 24 hours) at 1/4/2024 0933  Last data filed at 1/3/2024 2300  Gross per 24 hour   Intake --   Output 1100 ml   Net -1100 ml       Lines/Drains/Airways       Drain  Duration             Female External Urinary Catheter w/ Suction 12/29/23 6 days              Peripheral Intravenous Line  Duration                  Peripheral IV - Single Lumen 12/29/23 1544 20 G Left Antecubital 5 days                  Significant Labs:  Recent Results (from the past 72 hour(s))   Comprehensive Metabolic Panel    Collection Time: 01/02/24  3:25 AM   Result Value Ref Range    Sodium Level 134 (L) 136 - 145 mmol/L    Potassium Level 3.9 3.5 - 5.1 mmol/L    Chloride 95 (L) 98 - 107 mmol/L    Carbon Dioxide 24 23 - 31 mmol/L     Glucose Level 91 82 - 115 mg/dL    Blood Urea Nitrogen 17.1 9.8 - 20.1 mg/dL    Creatinine 0.78 0.55 - 1.02 mg/dL    Calcium Level Total 8.4 8.4 - 10.2 mg/dL    Protein Total 7.0 5.8 - 7.6 gm/dL    Albumin Level 3.0 (L) 3.4 - 4.8 g/dL    Globulin 4.0 (H) 2.4 - 3.5 gm/dL    Albumin/Globulin Ratio 0.8 (L) 1.1 - 2.0 ratio    Bilirubin Total 0.5 <=1.5 mg/dL    Alkaline Phosphatase 69 40 - 150 unit/L    Alanine Aminotransferase 36 0 - 55 unit/L    Aspartate Aminotransferase 47 (H) 5 - 34 unit/L    eGFR >60 mls/min/1.73/m2   Magnesium    Collection Time: 01/02/24  3:25 AM   Result Value Ref Range    Magnesium Level 1.80 1.60 - 2.60 mg/dL   CBC with Differential    Collection Time: 01/02/24  3:25 AM   Result Value Ref Range    WBC 16.57 (H) 4.50 - 11.50 x10(3)/mcL    RBC 4.90 4.20 - 5.40 x10(6)/mcL    Hgb 15.3 12.0 - 16.0 g/dL    Hct 45.9 37.0 - 47.0 %    MCV 93.7 80.0 - 94.0 fL    MCH 31.2 (H) 27.0 - 31.0 pg    MCHC 33.3 33.0 - 36.0 g/dL    RDW 11.9 11.5 - 17.0 %    Platelet 295 130 - 400 x10(3)/mcL    MPV 10.0 7.4 - 10.4 fL    Neut % 77.8 %    Lymph % 12.6 %    Mono % 6.8 %    Eos % 1.7 %    Basophil % 0.4 %    Lymph # 2.09 0.6 - 4.6 x10(3)/mcL    Neut # 12.89 (H) 2.1 - 9.2 x10(3)/mcL    Mono # 1.13 0.1 - 1.3 x10(3)/mcL    Eos # 0.28 0 - 0.9 x10(3)/mcL    Baso # 0.06 <=0.2 x10(3)/mcL    IG# 0.12 (H) 0 - 0.04 x10(3)/mcL    IG% 0.7 %    NRBC% 0.0 %   Nuclear Stress - Cardiology Interpreted    Collection Time: 01/02/24  1:06 PM   Result Value Ref Range    85% Max Predicted      Max Predicted      OHS CV CPX PATIENT IS MALE 0.0     [facility] CV CPX PATIENT IS FEMALE 1.0     Nuc Rest EF 64    RT Blood Gas    Collection Time: 01/02/24  8:26 PM   Result Value Ref Range    Sample Type Arterial Blood     Sample site Right Radial Artery     Drawn by damir ronquillo rrt     pH, Blood gas 7.650 (HH) 7.350 - 7.450    pCO2, Blood gas 27.0 (L) 35.0 - 45.0 mmHg    pO2, Blood gas 129.0 (H) 80.0 - 100.0 mmHg    Sodium, Blood Gas 122  (L) 137 - 145 mmol/L    Potassium, Blood Gas 3.9 3.5 - 5.0 mmol/L    Calcium Level Ionized 0.94 (L) 1.12 - 1.23 mmol/L    TOC2, Blood gas 30.5 mmol/L    Base Excess, Blood gas 9.40 (H) -2.00 - 2.00 mmol/L    sO2, Blood gas 99.4 %    HCO3, Blood gas 29.7 (H) 22.0 - 26.0 mmol/L    THb, Blood gas 12.8 12 - 16 g/dL    O2 Hb, Blood Gas 96.2 94.0 - 97.0 %    CO Hgb 0.4 (L) 0.5 - 1.5 %    Met Hgb 1.0 0.4 - 1.5 %    Oxygen Device, Blood gas Cannula     LPM 4    Basic Metabolic Panel    Collection Time: 01/03/24  4:22 AM   Result Value Ref Range    Sodium Level 135 (L) 136 - 145 mmol/L    Potassium Level 4.0 3.5 - 5.1 mmol/L    Chloride 94 (L) 98 - 107 mmol/L    Carbon Dioxide 31 23 - 31 mmol/L    Glucose Level 94 82 - 115 mg/dL    Blood Urea Nitrogen 15.4 9.8 - 20.1 mg/dL    Creatinine 0.80 0.55 - 1.02 mg/dL    BUN/Creatinine Ratio 19     Calcium Level Total 7.7 (L) 8.4 - 10.2 mg/dL    Anion Gap 10.0 mEq/L    eGFR >60 mls/min/1.73/m2   CBC with Differential    Collection Time: 01/03/24  4:22 AM   Result Value Ref Range    WBC 10.75 4.50 - 11.50 x10(3)/mcL    RBC 4.03 (L) 4.20 - 5.40 x10(6)/mcL    Hgb 12.8 12.0 - 16.0 g/dL    Hct 37.5 37.0 - 47.0 %    MCV 93.1 80.0 - 94.0 fL    MCH 31.8 (H) 27.0 - 31.0 pg    MCHC 34.1 33.0 - 36.0 g/dL    RDW 11.9 11.5 - 17.0 %    Platelet 328 130 - 400 x10(3)/mcL    MPV 9.1 7.4 - 10.4 fL    Neut % 71.9 %    Lymph % 16.6 %    Mono % 8.0 %    Eos % 2.3 %    Basophil % 0.3 %    Lymph # 1.78 0.6 - 4.6 x10(3)/mcL    Neut # 7.73 2.1 - 9.2 x10(3)/mcL    Mono # 0.86 0.1 - 1.3 x10(3)/mcL    Eos # 0.25 0 - 0.9 x10(3)/mcL    Baso # 0.03 <=0.2 x10(3)/mcL    IG# 0.10 (H) 0 - 0.04 x10(3)/mcL    IG% 0.9 %    NRBC% 0.0 %   Basic Metabolic Panel    Collection Time: 01/04/24  3:21 AM   Result Value Ref Range    Sodium Level 135 (L) 136 - 145 mmol/L    Potassium Level 4.0 3.5 - 5.1 mmol/L    Chloride 96 (L) 98 - 107 mmol/L    Carbon Dioxide 28 23 - 31 mmol/L    Glucose Level 90 82 - 115 mg/dL    Blood Urea  Nitrogen 15.6 9.8 - 20.1 mg/dL    Creatinine 0.81 0.55 - 1.02 mg/dL    BUN/Creatinine Ratio 19     Calcium Level Total 8.0 (L) 8.4 - 10.2 mg/dL    Anion Gap 11.0 mEq/L    eGFR >60 mls/min/1.73/m2   Magnesium    Collection Time: 01/04/24  3:21 AM   Result Value Ref Range    Magnesium Level 1.70 1.60 - 2.60 mg/dL   CBC with Differential    Collection Time: 01/04/24  3:21 AM   Result Value Ref Range    WBC 9.11 4.50 - 11.50 x10(3)/mcL    RBC 3.91 (L) 4.20 - 5.40 x10(6)/mcL    Hgb 12.3 12.0 - 16.0 g/dL    Hct 37.2 37.0 - 47.0 %    MCV 95.1 (H) 80.0 - 94.0 fL    MCH 31.5 (H) 27.0 - 31.0 pg    MCHC 33.1 33.0 - 36.0 g/dL    RDW 12.1 11.5 - 17.0 %    Platelet 309 130 - 400 x10(3)/mcL    MPV 9.1 7.4 - 10.4 fL    Neut % 63.6 %    Lymph % 21.6 %    Mono % 10.4 %    Eos % 2.7 %    Basophil % 0.3 %    Lymph # 1.97 0.6 - 4.6 x10(3)/mcL    Neut # 5.78 2.1 - 9.2 x10(3)/mcL    Mono # 0.95 0.1 - 1.3 x10(3)/mcL    Eos # 0.25 0 - 0.9 x10(3)/mcL    Baso # 0.03 <=0.2 x10(3)/mcL    IG# 0.13 (H) 0 - 0.04 x10(3)/mcL    IG% 1.4 %    NRBC% 0.0 %     Significant Imaging:  Imaging Results              X-Ray Chest 1 View (Final result)  Result time 12/29/23 12:13:33      Final result by Javier Mcneil MD (12/29/23 12:13:33)                   Impression:      Minimal blunting of the right costophrenic angle as above.    Slightly more confluent opacities in the right cardiophrenic angle region early infiltrate can not be completely excluded.    Otherwise no active pulmonary disease and no change as compared with previous exam      Electronically signed by: Javier Mcneil  Date:    12/29/2023  Time:    12:13               Narrative:    EXAMINATION:  XR CHEST 1 VIEW    CPT 45571    CLINICAL HISTORY:  shortness of breath;    COMPARISON:  July 6, 2020    FINDINGS:  Examination reveals mediastinal cardiac silhouette to be within normal limits left lung field is clear and free of gross infiltrates atelectasis or effusions.    Slightly more  confluent opacities identified in the right cardiophrenic angle region early infiltrate can not be completely excluded.    There might be minimal blunting of the right costophrenic angle which may indicate the presence of a small right-sided pleural effusion                                    Telemetry: SR     Physical Exam  Constitutional:       General: She is not in acute distress.     Appearance: Normal appearance.   HENT:      Head: Normocephalic.      Mouth/Throat:      Mouth: Mucous membranes are moist.   Cardiovascular:      Rate and Rhythm: Normal rate and regular rhythm.      Pulses: Normal pulses.      Heart sounds: Murmur: 2/6 TU.   Pulmonary:      Effort: Pulmonary effort is normal. No respiratory distress.      Comments: NC O2  Abdominal:      Palpations: Abdomen is soft.   Musculoskeletal:         General: No swelling. Normal range of motion.      Right lower leg: No edema.      Left lower leg: No edema.   Skin:     General: Skin is warm and dry.   Neurological:      General: No focal deficit present.      Mental Status: She is alert.   Psychiatric:         Mood and Affect: Mood normal.         Behavior: Behavior normal.         Judgment: Judgment normal.       Home Medications:   No current facility-administered medications on file prior to encounter.     Current Outpatient Medications on File Prior to Encounter   Medication Sig Dispense Refill    acetaminophen-codeine 300-30mg (TYLENOL #3) 300-30 mg Tab Take 1 tablet by mouth daily as needed (pain).      amLODIPine (NORVASC) 10 MG tablet Take 1 tablet (10 mg total) by mouth once daily. 90 tablet 3    cinacalcet (SENSIPAR) 30 MG Tab Take 1 tablet (30 mg total) by mouth once daily. 30 tablet 3    DULoxetine (CYMBALTA) 60 MG capsule Take 1 capsule (60 mg total) by mouth every morning. (Patient taking differently: Take 60 mg by mouth 2 (two) times daily.) 30 capsule 11    gabapentin (NEURONTIN) 300 MG capsule Take 1 capsule (300 mg total) by mouth 2  (two) times daily. 60 capsule 11    levothyroxine (SYNTHROID) 75 MCG tablet Take 1 tablet (75 mcg total) by mouth once daily. 90 tablet 3    losartan (COZAAR) 25 MG tablet Take 0.5 tablets (12.5 mg total) by mouth once daily. 45 tablet 3    metoprolol succinate (TOPROL-XL) 25 MG 24 hr tablet Take 1 tablet (25 mg total) by mouth 2 (two) times daily. 180 tablet 1    pravastatin (PRAVACHOL) 20 MG tablet Take 20 mg by mouth once daily.      traZODone (DESYREL) 100 MG tablet Take 1 tablet (100 mg total) by mouth every evening. 30 tablet 11     Current Inpatient Medications:    Current Facility-Administered Medications:     0.9%  NaCl infusion, , Intravenous, PRN, Brendon Portillo MD, Stopped at 12/30/23 1658    acetaminophen tablet 650 mg, 650 mg, Oral, Q8H PRN, Evert Murillo MD, 650 mg at 01/03/24 2045    acetaminophen tablet 650 mg, 650 mg, Oral, Q8H PRN, Evert Murillo MD    acetaminophen-codeine 300-30mg per tablet 1 tablet, 1 tablet, Oral, Daily PRN, Evert Murillo MD    albuterol-ipratropium 2.5 mg-0.5 mg/3 mL nebulizer solution 3 mL, 3 mL, Nebulization, Q6H, Warner Campos MD, 3 mL at 01/04/24 0750    amLODIPine tablet 10 mg, 10 mg, Oral, Daily, Evert Murillo MD, 10 mg at 01/04/24 0918    aspirin chewable tablet 81 mg, 81 mg, Oral, Daily, Bakari Murillo ANP, 81 mg at 01/04/24 0918    atorvastatin tablet 40 mg, 40 mg, Oral, Daily, Bakari Murillo ANP, 40 mg at 01/04/24 0919    cinacalcet tablet 30 mg, 30 mg, Oral, Daily, Evert Murillo MD, 30 mg at 01/04/24 0918    clopidogreL tablet 75 mg, 75 mg, Oral, Daily, Heaven Hayward NP, 75 mg at 01/04/24 0919    diclofenac sodium 1 % gel 2 g, 2 g, Topical (Top), TID PRN, Reyes, Thairy G, DO    DULoxetine DR capsule 60 mg, 60 mg, Oral, BID, Evert Murillo MD, 60 mg at 01/04/24 0919    enoxaparin injection 60 mg, 1 mg/kg, Subcutaneous, Q12H, Juan Luis Onofre., NP, 60 mg at 01/03/24 2040    gabapentin capsule 300 mg, 300 mg,  Oral, BID, Evert Murillo MD, 300 mg at 01/04/24 0919    levothyroxine tablet 75 mcg, 75 mcg, Oral, Daily, Evert Murillo MD, 75 mcg at 01/04/24 0918    losartan split tablet 12.5 mg, 12.5 mg, Oral, Daily, Evert Murillo MD, 12.5 mg at 01/04/24 0918    melatonin tablet 6 mg, 6 mg, Oral, Nightly PRN, Evert Murillo MD    metoprolol succinate (TOPROL-XL) 24 hr tablet 25 mg, 25 mg, Oral, BID, Evert Murillo MD, 25 mg at 01/04/24 0918    morphine injection 2 mg, 2 mg, Intravenous, Q2H PRN, JemimaChrisJuan Luis Q., NP    nitroGLYCERIN SL tablet 0.4 mg, 0.4 mg, Sublingual, Q5 Min PRN, Jemima Juan Luis Q., NP    sodium chloride 0.9% flush 10 mL, 10 mL, Intravenous, PRN, Evert Murillo MD    traZODone tablet 100 mg, 100 mg, Oral, QHS, Evert Murillo MD, 100 mg at 01/03/24 2100    zinc oxide-cod liver oil 40 % paste, , Topical (Top), BID, Josué Crooks MD, Given at 01/03/24 2040    VTE Risk Mitigation (From admission, onward)           Ordered     enoxaparin injection 60 mg  Every 12 hours (non-standard times)         12/31/23 1956     IP VTE HIGH RISK PATIENT  Once         12/30/23 0022     Place sequential compression device  Until discontinued         12/30/23 0022                  Assessment:   NSTEMI - Type Unspecified    - SPECT (1.2.23): moderate intensity, moderate sized,  in the basal to apical inferoapical wall(s) in the typical distribution of the RCA territory.     - Troponin Peak with Initial Troponin 2.126    - ECHO (12.29.23) - LVEF 55-60%, Normal Wall Motion     - Coronary Calcium Score 5.18.18 - Total 229    - MPI (2011) - Normal Perfusion Study, No Perfusion Defects Noted  CAP  Leukocytosis - Resolved   Cough 2/2 Above  CP 2/2 Above - Atypical   HTN  HLD  Anxiety  GERD  Fibromyalgia  OA  Hypothyroidism  No Hx of GIB    Plan:   Lexiscan SPECT reviewed   Treatment of Pneumonitis per Primary Team  Family agreeable to proceed with [facility] today   Risk and benefits  discussed Informed consent obtained ~ Consent placed on chart  Cont ASA & Plavix & BB  Cont Statin  Keep K > 4.0 and Mg > 2.0   Labs in AM: CBC, CMP and Mg    Heaven Hayward NP  Cardiology  [facility]  01/04/2024    Physician addendum:  I have seen and examined this patient as a split-shared visit with the ELIS d/t complicated medical management of above problems written in assessment and high acuity requiring physician expertise in medical decision-making. I performed the substantive portion of the history and exam. Above medical decision-making is also formulated by me.    Cardiovascular exam:  S1, S2  Lungs:   basilar rales  Extremities:  No edema, 2+ right radial, 2+ femoral pulses  Abdomen:  Left flank with large bruise from recent fall    Plan:  Risks, benefits, alternatives of left heart catheterization plus or minus intervention discussed in detail with patient.  Specific risks include but are not limited to stroke, death, heart attack, need for emergent surgery, bleeding, renal failure.  Patient voiced understanding and wishes to proceed.  Patient already on aspirin and Plavix  Patient is not interested in CT surgery.  I also do not feel she would be a good candidate given her pulmonary issues and frailty.  Consider intervention in same setting if appropriate.    Braxton Guidry MD  Cardiologist

## 2024-01-04 NOTE — PLAN OF CARE
01/04/24 1619   Discharge Reassessment   Assessment Type Discharge Planning Reassessment   Discharge Plan discussed with: Patient   Discharge Plan A Skilled Nursing Facility   Discharge Plan B Skilled Nursing Facility   Post-Acute Status   Post-Acute Authorization Placement     List of SNF providers  given to patient and son. Family will discuss. Will follow up in am for choice.

## 2024-01-04 NOTE — BRIEF OP NOTE
[facility] - Cath Lab Services  Brief Operative Note    SUMMARY     Surgery Date: 1/4/2024     Surgeon(s) and Role:     * David Banegas Jr., MD - Primary    Assisting Surgeon: None    Pre-op Diagnosis:  NSTEMI (non-ST elevated myocardial infarction) [I21.4]    Post-op Diagnosis:  Post-Op Diagnosis Codes:     * NSTEMI (non-ST elevated myocardial infarction) [I21.4]    Procedure(s) (LRB):  Left heart cath (N/A)    Anesthesia: RN IV Sedation    Implants:  Implant Name Type Inv. Item Serial No.  Lot No. LRB No. Used Action   STENT SYNERGY XD 3.0X24MM - [id number] stent coronary AIMEE - Balloon Exp STENT SYNERGY XD 3.0X24MM  [employer] [id number] N/A 1 Implanted   STENT SYNERGY XD 3.0X12MM - [id number] stent coronary AIMEE - Balloon Exp STENT SYNERGY XD 3.0X12MM  [employer] [id number] N/A 1 Implanted   KIT ANGIO SEAL 6FR VIP - [id number] Closure Device Angio KIT ANGIO SEAL 6FR VIP  [employer] [id number] Right 1 Implanted       Operative Findings: LAD lesion status post successful PCI    Estimated Blood Loss: * No values recorded between 1/4/2024  1:59 PM and 1/4/2024  3:00 PM *    Estimated Blood Loss has been documented.         Specimens:   Specimen (24h ago, onward)      None            [id number]

## 2024-01-04 NOTE — PROGRESS NOTES
Last 24 hours were reviewed in terms of the interaction with the patient and her son along with the primary Cardiology team regarding performing the angiogram.    Initially there was some hesitation but seems like the patient has a change of mind and is agreeing to it.  The son specifically ask that he would talk with me for more reassurance from a pulmonary standpoint.    Pleasant and jovial sweet lady now she has her hearing aids and she is easier to communicate with.  The son is at the bedside so we had the conversation in her attendance.  Of course I reiterated the fact that she is 89 and that she can be left alone as she is chest pain-free, but she understands and she would think that the intervention by Cardiology can make her feel better.  Of course the risks per se of the PCI, I had to redirect him to the Cardiology team in terms of the fact that it was a high-risk PCI to start with and it was not me who would be doing it.    But from a neck exam standpoint she was 95% on 2-1/2 L and that can be trimmed down some more.  He did confirm to me that she was a smoker in her youth and that is the exam sounding like diminished breath sounds and I had started her on neb treatment yesterday.  She can use the IS  which is in the room.   And her white cell count is stable and that has no cough.  So overall taken everything into consideration she can proceed with her angiogram and I wished him and his mom that is sweet lady the best of luck.  He was asking also about what next in terms of rehab and I told him we will cross that bridge after she comes out of the procedure and monitored for a day or 2 afterwards.

## 2024-01-05 LAB
ALBUMIN SERPL-MCNC: 3 G/DL (ref 3.4–4.8)
ALBUMIN/GLOB SERPL: 1 RATIO (ref 1.1–2)
ALP SERPL-CCNC: 59 UNIT/L (ref 40–150)
ALT SERPL-CCNC: 44 UNIT/L (ref 0–55)
AST SERPL-CCNC: 56 UNIT/L (ref 5–34)
BASOPHILS # BLD AUTO: 0.05 X10(3)/MCL
BASOPHILS NFR BLD AUTO: 0.5 %
BILIRUB SERPL-MCNC: 0.9 MG/DL
BUN SERPL-MCNC: 18.7 MG/DL (ref 9.8–20.1)
CALCIUM SERPL-MCNC: 8.1 MG/DL (ref 8.4–10.2)
CHLORIDE SERPL-SCNC: 97 MMOL/L (ref 98–107)
CO2 SERPL-SCNC: 28 MMOL/L (ref 23–31)
CREAT SERPL-MCNC: 0.86 MG/DL (ref 0.55–1.02)
EOSINOPHIL # BLD AUTO: 0.23 X10(3)/MCL (ref 0–0.9)
EOSINOPHIL NFR BLD AUTO: 2.3 %
ERYTHROCYTE [DISTWIDTH] IN BLOOD BY AUTOMATED COUNT: 12.3 % (ref 11.5–17)
GFR SERPLBLD CREATININE-BSD FMLA CKD-EPI: >60 MLS/MIN/1.73/M2
GLOBULIN SER-MCNC: 3.1 GM/DL (ref 2.4–3.5)
GLUCOSE SERPL-MCNC: 136 MG/DL (ref 82–115)
HCT VFR BLD AUTO: 38 % (ref 37–47)
HGB BLD-MCNC: 12.7 G/DL (ref 12–16)
IMM GRANULOCYTES # BLD AUTO: 0.16 X10(3)/MCL (ref 0–0.04)
IMM GRANULOCYTES NFR BLD AUTO: 1.6 %
LYMPHOCYTES # BLD AUTO: 1.85 X10(3)/MCL (ref 0.6–4.6)
LYMPHOCYTES NFR BLD AUTO: 18.1 %
MCH RBC QN AUTO: 31.5 PG (ref 27–31)
MCHC RBC AUTO-ENTMCNC: 33.4 G/DL (ref 33–36)
MCV RBC AUTO: 94.3 FL (ref 80–94)
MONOCYTES # BLD AUTO: 0.58 X10(3)/MCL (ref 0.1–1.3)
MONOCYTES NFR BLD AUTO: 5.7 %
NEUTROPHILS # BLD AUTO: 7.34 X10(3)/MCL (ref 2.1–9.2)
NEUTROPHILS NFR BLD AUTO: 71.8 %
NRBC BLD AUTO-RTO: 0 %
PLATELET # BLD AUTO: 417 X10(3)/MCL (ref 130–400)
PMV BLD AUTO: 8.8 FL (ref 7.4–10.4)
POTASSIUM SERPL-SCNC: 4.7 MMOL/L (ref 3.5–5.1)
PROT SERPL-MCNC: 6.1 GM/DL (ref 5.8–7.6)
RBC # BLD AUTO: 4.03 X10(6)/MCL (ref 4.2–5.4)
SODIUM SERPL-SCNC: 135 MMOL/L (ref 136–145)
WBC # SPEC AUTO: 10.21 X10(3)/MCL (ref 4.5–11.5)

## 2024-01-05 PROCEDURE — 25000003 PHARM REV CODE 250: Performed by: INTERNAL MEDICINE

## 2024-01-05 PROCEDURE — 63600175 PHARM REV CODE 636 W HCPCS: Performed by: NURSE PRACTITIONER

## 2024-01-05 PROCEDURE — 80053 COMPREHEN METABOLIC PANEL: CPT | Performed by: INTERNAL MEDICINE

## 2024-01-05 PROCEDURE — 25000242 PHARM REV CODE 250 ALT 637 W/ HCPCS: Performed by: INTERNAL MEDICINE

## 2024-01-05 PROCEDURE — 25000003 PHARM REV CODE 250: Performed by: NURSE PRACTITIONER

## 2024-01-05 PROCEDURE — 99900035 HC TECH TIME PER 15 MIN (STAT)

## 2024-01-05 PROCEDURE — 99223 1ST HOSP IP/OBS HIGH 75: CPT | Mod: 25,,, | Performed by: INTERNAL MEDICINE

## 2024-01-05 PROCEDURE — 21400001 HC TELEMETRY ROOM

## 2024-01-05 PROCEDURE — 97530 THERAPEUTIC ACTIVITIES: CPT

## 2024-01-05 PROCEDURE — 99497 ADVNCD CARE PLAN 30 MIN: CPT | Mod: 25,,, | Performed by: INTERNAL MEDICINE

## 2024-01-05 PROCEDURE — 94640 AIRWAY INHALATION TREATMENT: CPT

## 2024-01-05 PROCEDURE — 27000221 HC OXYGEN, UP TO 24 HOURS

## 2024-01-05 PROCEDURE — 63600175 PHARM REV CODE 636 W HCPCS: Performed by: INTERNAL MEDICINE

## 2024-01-05 PROCEDURE — 94761 N-INVAS EAR/PLS OXIMETRY MLT: CPT

## 2024-01-05 PROCEDURE — 85025 COMPLETE CBC W/AUTO DIFF WBC: CPT | Performed by: INTERNAL MEDICINE

## 2024-01-05 RX ADMIN — TRAZODONE HYDROCHLORIDE 100 MG: 100 TABLET ORAL at 08:01

## 2024-01-05 RX ADMIN — IPRATROPIUM BROMIDE AND ALBUTEROL SULFATE 3 ML: 2.5; .5 SOLUTION RESPIRATORY (INHALATION) at 07:01

## 2024-01-05 RX ADMIN — AMLODIPINE BESYLATE 10 MG: 5 TABLET ORAL at 09:01

## 2024-01-05 RX ADMIN — ENOXAPARIN SODIUM 60 MG: 80 INJECTION SUBCUTANEOUS at 08:01

## 2024-01-05 RX ADMIN — CINACALCET 30 MG: 30 TABLET, FILM COATED ORAL at 09:01

## 2024-01-05 RX ADMIN — DULOXETINE HYDROCHLORIDE 60 MG: 30 CAPSULE, DELAYED RELEASE ORAL at 08:01

## 2024-01-05 RX ADMIN — IPRATROPIUM BROMIDE AND ALBUTEROL SULFATE 3 ML: 2.5; .5 SOLUTION RESPIRATORY (INHALATION) at 01:01

## 2024-01-05 RX ADMIN — METOPROLOL SUCCINATE 25 MG: 25 TABLET, EXTENDED RELEASE ORAL at 08:01

## 2024-01-05 RX ADMIN — GABAPENTIN 300 MG: 300 CAPSULE ORAL at 09:01

## 2024-01-05 RX ADMIN — METOPROLOL SUCCINATE 25 MG: 25 TABLET, EXTENDED RELEASE ORAL at 09:01

## 2024-01-05 RX ADMIN — Medication: at 08:01

## 2024-01-05 RX ADMIN — LEVOTHYROXINE SODIUM 75 MCG: 25 TABLET ORAL at 09:01

## 2024-01-05 RX ADMIN — ATORVASTATIN CALCIUM 40 MG: 40 TABLET, FILM COATED ORAL at 09:01

## 2024-01-05 RX ADMIN — IPRATROPIUM BROMIDE AND ALBUTEROL SULFATE 3 ML: 2.5; .5 SOLUTION RESPIRATORY (INHALATION) at 12:01

## 2024-01-05 RX ADMIN — GABAPENTIN 300 MG: 300 CAPSULE ORAL at 08:01

## 2024-01-05 RX ADMIN — ENOXAPARIN SODIUM 60 MG: 80 INJECTION SUBCUTANEOUS at 09:01

## 2024-01-05 RX ADMIN — ASPIRIN 81 MG CHEWABLE TABLET 81 MG: 81 TABLET CHEWABLE at 09:01

## 2024-01-05 RX ADMIN — LOSARTAN POTASSIUM 12.5 MG: 25 TABLET, FILM COATED ORAL at 09:01

## 2024-01-05 RX ADMIN — DULOXETINE HYDROCHLORIDE 60 MG: 30 CAPSULE, DELAYED RELEASE ORAL at 09:01

## 2024-01-05 RX ADMIN — Medication: at 09:01

## 2024-01-05 RX ADMIN — CLOPIDOGREL BISULFATE 75 MG: 75 TABLET ORAL at 09:01

## 2024-01-05 NOTE — PROGRESS NOTES
[facility]  Hospital Medicine Progress Note        CHIEF COMPLAINT   Cough (C/o cough worsening since yesterday with pulse ox reading at 86% on room air per Home Health just PTA. Denies SOB or known fever. O2 89% on RA in triage. 95% on 3L NC.)     HISTORY OF PRESENT ILLNESS:   Patient is a very pleasant 89-year-old female with past medical history as below including chronic kidney disease, hypertension, hyperlipidemia, anxiety, fibromyalgia, hypothyroidism who presented to the ER with complaints of worsening cough over the last 48 hours and a low home O2 reading per home health with home sat being 86% on room air.     She arrived to the ER requiring 3 L nasal cannula to maintain adequate sats, but afebrile hemodynamically stable.  Laboratory work showed a mild leukocytosis 14 K, a BNP of 840, a troponin of 1.9.  EKG showed sinus rhythm with RBBB.  Chest x-ray showed right-sided infiltrates.  She was seen by Cardiology in the ER and started on a heparin drip, she was given empiric antibiotics admitted to the hospitalist service for community-acquired pneumonia and NSTEMI.        Patient currently admitted for acute hypoxic respiratory failure due to adenovirus with bacterial coinfection pneumonia complicated by NSTEMI 1 versus 2, on heparin drip per CIS recommendations , plans for left heart catheterization     Today's information   Has gone for C , f/up post procedure recs   - consult palliative care for [facility]      Exam  GENERAL: awake, alert, oriented and in no acute distress, non-toxic appearing   HEENT: normocephalic atraumatic   NECK: supple   LUNGS:  Bilateral wheezing/rhonchi, no wheezing or rales, no accessory muscle use   CVS: Regular rate and rhythm, normal peripheral perfusion  ABD: Soft, non-tender, non-distended, bowel sounds present  EXTREMITIES: no clubbing or cyanosis  SKIN: Warm, dry.   NEURO: alert and oriented, grossly without focal deficits   PSYCHIATRIC:  Cooperative        ASSESSMENT & PLAN:   Community-acquired pneumonia -completed antibiotics   Acute hypoxemic respiratory failure 2/2 above   NSTEMI, likely type 2 demand secondary to above   Leukocytosis , resolved   Adenovirus infection  Cough   Atypical chest pain   Hypertension, controlled   Low magnesium levels   Grade 2 diastolic dysfunction        HX: Anxiety, fibromyalgia, HTN, hypothyroidism, hyperlipidemia      Plan   gone for LHC , f/up post procedure recs   - consult palliative care for [facility]   Patient continues to require 4 L of oxygen via nasal cannula despite antibiotics and IV Lasix   CTA chest was done with no pulmonary embolism reactive LN, recommend repeat CT in 3-6 m  Leukocytosis now resolved  Evaluated by pulmonology 1/3 recommend cardiac eval  Blood cultures negative  Continue oxygen supplementation to goal SpO2 greater than 94%   Provide with incentive spirometry   Received ceftriaxone and azithromycin for 5 days   Completed IV  Lasix 20 mg daily for 3 days     DVT prophylaxis: sc lovenox  Code status: full          VITAL SIGNS: 24 HRS MIN & MAX LAST   Temp  Min: 97.6 °F (36.4 °C)  Max: 99.2 °F (37.3 °C) 98.1 °F (36.7 °C)   BP  Min: 112/60  Max: 143/74 133/67   Pulse  Min: 70  Max: 80  73   Resp  Min: 16  Max: 23 16   SpO2  Min: 91 %  Max: 96 % (!) 93 %     I have reviewed the following labs:  Recent Labs   Lab 01/02/24 0325 01/03/24 0422 01/04/24 0321   WBC 16.57* 10.75 9.11   RBC 4.90 4.03* 3.91*   HGB 15.3 12.8 12.3   HCT 45.9 37.5 37.2   MCV 93.7 93.1 95.1*   MCH 31.2* 31.8* 31.5*   MCHC 33.3 34.1 33.1   RDW 11.9 11.9 12.1    328 309   MPV 10.0 9.1 9.1     Recent Labs   Lab 12/31/23  0351 01/01/24  0432 01/02/24 0325 01/02/24 2026 01/03/24 0422 01/04/24 0321   * 130* 134*  --  135* 135*   K 3.5 3.6 3.9  --  4.0 4.0   CO2 26 26 24  --  31 28   BUN 17.4 17.7 17.1  --  15.4 15.6   CREATININE 0.74 0.72 0.78  --  0.80 0.81   CALCIUM 7.3* 7.6* 8.4  --  7.7* 8.0*   PH  --   --    --  7.650*  --   --    MG 2.00 1.80 1.80  --   --  1.70   ALBUMIN 2.6* 2.4* 3.0*  --   --   --    ALKPHOS 52 54 69  --   --   --    ALT 24 29 36  --   --   --    AST 43* 48* 47*  --   --   --    BILITOT 0.5 0.4 0.5  --   --   --      Microbiology Results (last 7 days)       Procedure Component Value Units Date/Time    Blood Culture [[id number]]  (Normal) Collected: 12/30/23 0012    Order Status: Completed Specimen: Blood, Venous Updated: 01/04/24 0101     CULTURE, BLOOD ([facility]) No Growth at 5 days    Blood Culture [[id number]]  (Normal) Collected: 12/30/23 0012    Order Status: Completed Specimen: Blood, Venous Updated: 01/04/24 0101     CULTURE, BLOOD ([facility]) No Growth at 5 days    Respiratory Culture [[id number]]     Order Status: Sent Specimen: Sputum, Expectorated              See below for Radiology    Scheduled Med:   albuterol-ipratropium  3 mL Nebulization Q6H    amLODIPine  10 mg Oral Daily    aspirin  81 mg Oral Daily    atorvastatin  40 mg Oral Daily    cinacalcet  30 mg Oral Daily    clopidogreL  75 mg Oral Daily    DULoxetine  60 mg Oral BID    enoxaparin  1 mg/kg Subcutaneous Q12H    gabapentin  300 mg Oral BID    levothyroxine  75 mcg Oral Daily    losartan  12.5 mg Oral Daily    metoprolol succinate  25 mg Oral BID    traZODone  100 mg Oral QHS    zinc oxide-cod liver oil   Topical (Top) BID      Continuous Infusions:     PRN Meds:  sodium chloride 0.9%, acetaminophen, acetaminophen, acetaminophen-codeine 300-30mg, diclofenac sodium, hydrALAZINE, melatonin, morphine, nitroGLYCERIN, ondansetron, sodium chloride 0.9%, sodium chloride 0.9%     Assessment/Plan:      VTE prophylaxis:     Patient condition:  Stable/Fair/Guarded/ Serious/ Critical    Anticipated discharge and Disposition:         All diagnosis and differential diagnosis have been reviewed; assessment and plan has been documented; I have personally reviewed the labs and test results that are presently available; I have reviewed the patients  medication list; I have reviewed the consulting providers response and recommendations. I have reviewed or attempted to review medical records based upon their availability    All of the patient's questions have been  addressed and answered. Patient's is agreeable to the above stated plan. I will continue to monitor closely and make adjustments to medical management as needed.  _____________________________________________________________________    Nutrition Status:    Radiology:  I have personally reviewed the following imaging and agree with the radiologist.     Cardiac catheterization    There is severe coronary artery disease.    Prox Left Anterior Descending had a 95% stenosis. The lesion was   successfully treated with overlapping 3.0X24MM and 3.0X12MM SYNERGY XD   drug-eluting stents. Post-dilatation was performed using an [employer] MR   3.53H74HN angioplasty balloon inflated to 18 rajeev. Following intervention   there was 0% residual stenosis and ERON grade 3 flow in the distal vessel.    Intravascular Ultrasound (IVUS) was performed prior to intervention to   further characterize the lesion, as well as post-stent deployment to   ensure optimal stent apposition and expansion.    Mid Right Coronary Artery is chronically 100% occluded. The distal   vessel is filled via left-to-right collaterals.    The procedure log was documented by No documenter listed and verified by   David Banegas Jr, MD.    Date: 1/4/2024  Time: 3:16 PM    Procedure:  Left heart catheterization  Percutaneous coronary intervention     Preoperative diagnosis:  NSTEMI    Postoperative diagnosis:  Successful PCI     Access:  Right common femoral artery    Estimated blood loss: 10 cc  Complications: None     Summary:  Consent obtained. Risks and benefits discussed with the patient. The   patient was brought to the cath lab in a fasting state. The patient was   prepped and draped in usual sterile fashion. A preprocedure time-out was    performed.     A 5 Persian JL4 catheter was used to cannulate the left main coronary   artery; angiography was performed, and multiple fluoroscopic views were   obtained.  A 5 Persian JR4 catheter was used to cannulate the right coronary artery;   angiography was performed, and multiple fluoroscopic views were obtained.    The proximal LAD lesion was wired with a hyper coat run-through wire.    Balloon angioplasty was performed using a 2.75 by 15 mm [employer] MR   compliant angioplasty balloon.  Subsequently, intravascular ultrasound was   performed to further characterize the lesion, and size the vessel for   stenting.  Using these measurements, a 3.0 x 24 mm synergy XD drug-eluting   stent was deployed across the lesion.  Intravascular ultrasound was again   performed, which did show some residual disease just distal to this stent.    Accordingly, a 3.0 x 12 mm synergy XD drug-eluting stent was deployed in   overlapping manner, just proximal to a large diagonal branch.    Subsequently, using the IVUS measurements obtained previously, balloon   angioplasty was performed using a 3.25 x 15 mm noncompliant angioplasty   balloon inflated to 18 atmospheres.  Intravascular ultrasound was then   performed one final time, which showed no evidence of edge dissection,   well-expanded and apposed stents.  A wire out shot was performed, which   showed excellent angiographic result and ERON grade 3 flow in the distal   vessel.    At the end the procedure, all wires and catheters were removed.   Hemostasis achieved with an Angioseal.     Findings:  - There is severe coronary artery disease.  - Prox Left Anterior Descending had a 95% stenosis. The lesion was   successfully treated with overlapping 3.0X24MM and 3.0X12MM SYNERGY XD   drug-eluting stents. Post-dilatation was performed using an [employer] MR   3.18E76SQ angioplasty balloon inflated to 18 rajeev. Following intervention   there was 0% residual stenosis and ERON grade 3 flow  in the distal vessel.  - Intravascular Ultrasound (IVUS) was performed prior to intervention to   further characterize the lesion, as well as post-stent deployment to   ensure optimal stent apposition and expansion.  - Mid Right Coronary Artery is chronically 100% occluded.  The distal   vessel is filled via left-to-right collaterals.     Assessment/Plan:  - Patient is a 89 y.o. female presented with dyspnea on exertion.    Troponin was checked, and found to be significantly elevated.  She is   being treated by pulmonology for pneumonitis.  LHC showed severe CAD which   was treated as described above.  - Continue DAPT (aspirin + clopidogrel) for a minimum of 12 months and   aspirin indefinitely thereafter  - Continue high intensity statin  - Medical mgmt of [facility]     David Banegas MD  Interventional Cardiology/Structural Heart Disease  [facility] [place] [facility]      Josué Crooks MD   01/04/2024

## 2024-01-05 NOTE — PT/OT/SLP PROGRESS
Physical Therapy Treatment    Patient Name:  Edwin Boyer   MRN:  [id number]    Recommendations:     Discharge therapy intensity: Moderate Intensity Therapy   Discharge Equipment Recommendations: walker, rolling, to be determined by next level of care  Barriers to discharge: Ongoing medical needs    Assessment:     Edwin Boyer is a 89 y.o. female admitted with a medical diagnosis of NSTEMI (non-ST elevated myocardial infarction). She presented to ER with ongoing cough and hypoxia on RA. Pt with community acquired pneumonia, acute hypoxic respiratory failure, leukocytosis, adenovirus infection with past medical history significant for anxiety, fibromyalgia, HTN, hypothyroidism, hyperlipidemia.  She presents with the following impairments/functional limitations: weakness, impaired endurance, impaired self care skills, impaired functional mobility, gait instability, impaired balance, edema, impaired cardiopulmonary response to activity, impaired skin. Pt underwent successful PCI 1/4/23. She remains on supplemental oxygen, stable at rest on 2L but easily desaturates with minimal exertion. Required increase to 3L for improved tolerance to mobility, however remains with decreased activity tolerance compared to baseline. Will benefit from ongoing skilled interventions to improve functional endurance prior to returning home.    Rehab Prognosis: Good; patient would benefit from acute skilled PT services to address these deficits and reach maximum level of function.    Recent Surgery: Procedure(s) (LRB):  Left heart cath (N/A) 1 Day Post-Op    Plan:     During this hospitalization, patient to be seen 5 x/week to address the identified rehab impairments via gait training, therapeutic activities, therapeutic exercises, neuromuscular re-education and progress toward the following goals:    Plan of Care Expires:  02/02/24    Subjective     Chief Complaint: positional L flank pain  Patient/Family Comments/goals: to get  stronger   Pain/Comfort:  Pain Rating 1: 0/10  Location - Side 1: Left  Location 1: flank  Pain Addressed 1: Reposition, Cessation of Activity  Pain Rating Post-Intervention 1: 5/10 (pt with positional L lateral flank pain)0/10      Objective:     Communicated with RN prior to session.  Patient found HOB elevated with oxygen, PureWick, peripheral IV, pulse ox (continuous), telemetry upon PT entry to room.     General Precautions: Standard, fall  Orthopedic Precautions: N/A  Braces: N/A  Respiratory Status: Nasal cannula, flow 2 L/min resting in bed at 95%. Easily desats to 86% with bed mobility, slow to recover with extended rest breaks required.   PT increased to 3L during session, able to maintain high saturations with increased tolerance to mobility prior to rest break.  Skin Integrity:  skin breakdown to sacrum, L lateral flank bruising      Functional Mobility:  Bed Mobility:     Rolling Left:  contact guard assistance  Rolling Right: contact guard assistance, to cassy brief prior to OOB mobility  Scooting: contact guard assistance  Supine to Sit: contact guard assistance  Increased time to complete activities for greater functional independence  Transfers:     Sit to Stand:  minimum assistance with rolling walker  2 attempts required with each repetition, verbal cues for hand placement and sequencing  Gait: Pt ambulated 55ft with 2 standing rest breaks, CGA with RW. Verbal cues for posture and controlled breathing with mobility. Step through gait pattern with increased double stance time and unequal step length.    Therapeutic Activity:  Static standing balance with RW- CGA with verbal/tactile cues for postural correction and pursed lip breathing. Pt with prolonged, immediate void upon standing.  Standing LE there-ex: 1x10 reps for alternating LE marches with UE support.    Education:  Patient provided with verbal education education regarding PT role/goals/POC, fall prevention, and safety awareness.   Understanding was verbalized.     Patient left up in chair with all lines intact, call button in reach, and nurse notified..    GOALS:   Multidisciplinary Problems       Physical Therapy Goals          Problem: Physical Therapy    Goal Priority Disciplines Outcome Goal Variances Interventions   Physical Therapy Goal     PT, PT/OT Ongoing, Progressing     Description: Goals to be met by: 2024     Patient will increase functional independence with mobility by performin. Supine to sit with Stand-by Assistance  2. Sit to stand transfer with Stand-by Assistance  3. Bed to chair transfer with Stand-by Assistance using Rolling Walker  4. Gait  x 150 feet with Stand-by Assistance using Rolling Walker.                          Time Tracking:     PT Received On: 24  PT Start Time: 1210     PT Stop Time: 1240  PT Total Time (min): 30 min     Billable Minutes: Therapeutic Activity 30    Treatment Type: Treatment  PT/PTA: PT     Number of PTA visits since last PT visit: 2     2024

## 2024-01-05 NOTE — PRE ADMISSION SCREENING
[facility]    Pre-Admission Patient Screening                    Pre-Screen type:  SNF:  Reason for Admission:    Acute respiratory failure with hypoxia  NSTEMI    SNF Admission Criteria:    Primary: Rehab Services     Actively treated hospital diagnosis/diagnoses: Respiratory Failure    Facility Status: Accept     Referring Physician:  Josué Crooks MD    Admitting Physician:  Brendon Portillo MD    Primary Care Physician:  Mariely Soto MD    History         Patient Active Problem List    Diagnosis Date Noted    NSTEMI (non-ST elevated myocardial infarction) 12/30/2023    Carpal tunnel syndrome 09/18/2023    Need for influenza vaccination 09/18/2023    Peripheral polyneuropathy 09/18/2023    Primary hyperparathyroidism 02/08/2023    Hypercalcemia 08/08/2022    Urinary incontinence 08/08/2022    Hypothyroidism 06/06/2022    HTN (hypertension), benign 06/06/2022    Hyperlipidemia 06/06/2022    GERD with esophagitis 06/06/2022    OA (osteoarthritis) 06/06/2022    Fibromyalgia 06/06/2022    Insomnia 06/06/2022    Osteoporosis 06/06/2022    CKD (chronic kidney disease) stage 3, GFR 30-59 ml/min 06/06/2022    Anxiety 06/06/2022         Previous Specialties/Consulted physicians:      Cardiology , Infectious Diseases , and Pulmonology       Past and Current Medical History    Past Medical History:   Diagnosis Date    Anxiety 6/6/2022    Carpal tunnel syndrome, bilateral     Chronic back pain     CKD (chronic kidney disease) stage 3, GFR 30-59 ml/min 6/6/2022    Fibromyalgia     GERD with esophagitis 6/6/2022    HTN (hypertension), benign 6/6/2022    Hyperlipidemia 6/6/2022    Hypothyroidism 6/6/2022    Insomnia 6/6/2022    OA (osteoarthritis) 6/6/2022    Osteoporosis     Primary hyperparathyroidism 2/8/2023    Urinary incontinence 8/8/2022           History of Present Illness     Patient is a very pleasant 89-year-old female with past medical history as below including chronic kidney disease,  hypertension, hyperlipidemia, anxiety, fibromyalgia, hypothyroidism who presented to the ER with complaints of worsening cough over the last 48 hours and a low home O2 reading per home health with home sat being 86% on room air.     She arrived to the ER requiring 3 L nasal cannula to maintain adequate sats, but afebrile hemodynamically stable.  Laboratory work showed a mild leukocytosis 14 K, a BNP of 840, a troponin of 1.9.  EKG showed sinus rhythm with RBBB.  Chest x-ray showed right-sided infiltrates.  She was seen by Cardiology in the ER and started on a heparin drip, she was given empiric antibiotics admitted to the hospitalist service for community-acquired pneumonia and NSTEMI.        Patient currently admitted for acute hypoxic respiratory failure due to adenovirus with bacterial coinfection pneumonia complicated by NSTEMI 1 versus 2, on heparin drip per CIS recommendations , s/p LHC on 1/4- with PCI to LAD , on DAPT         Today's information   Patient seen and examined at bedside, no family at bedside  - no new complaints   - s/p LHC on 1/4- with PCI to LAD , on DAPT   - consult palliative care for [facility]       ASSESSMENT & PLAN:   Community-acquired pneumonia -completed antibiotics   Acute hypoxemic respiratory failure 2/2 above   NSTEMI, likely type 1  CAD - PCI to LAD 1/4  Leukocytosis , resolved   Adenovirus infection  Cough   Atypical chest pain   Hypertension, controlled   Low magnesium levels   Grade 2 diastolic dysfunction        HX: Anxiety, fibromyalgia, HTN, hypothyroidism, hyperlipidemia      Plan  - s/p LHC on 1/4- with PCI to LAD , on DAPT , statin   - f/up cis recs  - consult palliative care for [facility]   Patient continues to require 2L of oxygen  CTA chest was done with no pulmonary embolism reactive LN, recommend repeat CT in 3-6 m  Leukocytosis now resolved  Evaluated by pulmonology 1/3 recommend cardiac eval  Blood cultures negative  Continue oxygen supplementation to goal SpO2 greater than  94%   Provide with incentive spirometry   Received ceftriaxone and azithromycin for 5 days   Completed IV  Lasix 20 mg daily for 3 days     DVT prophylaxis: sc lovenox  Code status: full       Patient Traveled outside of the [place] in the last 3 months? no     Patient discharged from this LTAC to SNF within the last 45 days? no    Patient discharged from this LTAC to Rehab within the last 27 days? no    Prior residence: home    Prior Post-Acute Services: home health Nursing Specialties    Allergies:   Review of patient's allergies indicates:   Allergen Reactions    Meperidine      Other reaction(s): EXCITATIVE TYPE PSYCHOSIS    Sulfamethoxazole        Has patient received the current influenza vaccine (Oct 1 - March 31)? Unknown     Has patient received PPD skin test prior to admit? N/A     Code Status: Full Code    Orientation: Time, Place, and Person    Speech: normal     Vital Signs:     Date 01/05/24 01/05/24   Blood Pressure 116/74 148/72   Pulse 76 77   Respiratory Rate 18 20   O2 Saturation 96% 93%   Temperature 98.2        Bowel/Bladder: incontinent of bladder and continent of bowel  Bowel/Bladder Appliance: external urinary catheter    Dialysis: N/A         Peripheral IV - Single Lumen 12/29/23 1544 20 G Left Antecubital (Active)   Site Assessment Clean;Dry;Intact;No redness;No swelling;No warmth;No drainage 01/05/24 1148   Extremity Assessment Distal to IV No warmth;No swelling;No redness;No abnormal discoloration 01/04/24 2000   Line Status Saline locked 01/05/24 1148   Dressing Status Clean;Dry;Intact 01/05/24 1148   Dressing Intervention Integrity maintained 01/05/24 1148   Number of days: 6       Female External Urinary Catheter w/ Suction 12/29/23 (Active)   Skin no redness;no breakdown 01/05/24 0800   Tolerance no signs/symptoms of discomfort 01/05/24 0800   Suction Continuous suction at 40 mmHg 01/05/24 0800   Date of last wick change 01/03/24 01/03/24 2045   Time of last wick change 2015 01/03/24  2045   Number of days: 7       CBGs/Accuchecks: No     Precautions: Fall, Cardiac, and Blood Pressure/Syncope    Restraints: No     Isolation Precautions: N/A       Facility-Administered Medications as of 1/5/2024   Medication Dose Route Frequency Provider Last Rate Last Admin    0.9%  NaCl infusion   Intravenous PRN Brendon Portillo MD   Stopped at 12/30/23 1658    acetaminophen tablet 650 mg  650 mg Oral Q8H PRN Evert Murillo MD   650 mg at 01/03/24 2045    acetaminophen tablet 650 mg  650 mg Oral Q8H PRN Evert Murillo MD        acetaminophen-codeine 300-30mg per tablet 1 tablet  1 tablet Oral Daily PRN Evert Murillo MD        albuterol-ipratropium 2.5 mg-0.5 mg/3 mL nebulizer solution 3 mL  3 mL Nebulization Q6H Warner Campos MD   3 mL at 01/05/24 1252    amLODIPine tablet 10 mg  10 mg Oral Daily Evert Murillo MD   10 mg at 01/05/24 0903    aspirin chewable tablet 81 mg  81 mg Oral Daily Bakari Murillo ANP   81 mg at 01/05/24 0903    [COMPLETED] aspirin EC tablet 325 mg  325 mg Oral ED 1 Time Tariq Dukes III, MD   325 mg at 12/29/23 1503    atorvastatin tablet 40 mg  40 mg Oral Daily Bakari Murillo ANP   40 mg at 01/05/24 0903    [COMPLETED] azithromycin 500 mg in dextrose 5 % 250 mL IVPB (ready to mix)  500 mg Intravenous ED 1 Time Tariq Dukes III, MD   Stopped at 12/29/23 1639    [COMPLETED] azithromycin 500 mg in dextrose 5 % 250 mL IVPB (ready to mix)  500 mg Intravenous Q24H Evert Murillo MD   Stopped at 01/02/24 1830    [COMPLETED] cefTRIAXone (ROCEPHIN) 1 g in dextrose 5 % in water (D5W) 100 mL IVPB (MB+)  1 g Intravenous Q24H Evert Murillo MD   Stopped at 01/02/24 1433    [COMPLETED] cefTRIAXone (ROCEPHIN) 2 g in dextrose 5 % in water (D5W) 100 mL IVPB (MB+)  2 g Intravenous ED 1 Time Tariq Dukes III, MD   Stopped at 12/29/23 1532    cinacalcet tablet 30 mg  30 mg Oral Daily Evert Murillo MD   30 mg at 01/05/24 0903     [COMPLETED] clopidogreL tablet 225 mg  225 mg Oral Once Heaven Hayward NP   225 mg at 01/03/24 1355    clopidogreL tablet 75 mg  75 mg Oral Daily Heaven Hayward NP   75 mg at 01/05/24 0903    diclofenac sodium 1 % gel 2 g  2 g Topical (Top) TID PRN Reyes, Thairy G, DO        DULoxetine DR capsule 60 mg  60 mg Oral BID Evert Murillo MD   60 mg at 01/05/24 0903    enoxaparin injection 60 mg  1 mg/kg Subcutaneous Q12H Jemima, Juan Luis Q., NP   60 mg at 01/05/24 0902    [COMPLETED] furosemide injection 20 mg  20 mg Intravenous Daily Josué Crooks MD   20 mg at 01/02/24 0913    gabapentin capsule 300 mg  300 mg Oral BID Evert Murillo MD   300 mg at 01/05/24 0903    [COMPLETED] heparin 25,000 units in dextrose 5% (100 units/ml) IV bolus from bag INITIAL BOLUS (max bolus 4000 units)  60 Units/kg (Adjusted) Intravenous Once Bakari Murillo ANP   3,260 Units at 12/29/23 1608    hydrALAZINE injection 10 mg  10 mg Intravenous Q4H PRN David Banegas Jr., MD        [COMPLETED] iopamidoL (ISOVUE-370) injection 100 mL  100 mL Intravenous ONCE PRN Josué Crooks MD   100 mL at 12/31/23 1244    levothyroxine tablet 75 mcg  75 mcg Oral Daily Evert Murillo MD   75 mcg at 01/05/24 0903    losartan split tablet 12.5 mg  12.5 mg Oral Daily Evert Murillo MD   12.5 mg at 01/05/24 0903    [COMPLETED] magnesium sulfate 2g in water 50mL IVPB (premix)  4 g Intravenous Once Josué Crooks MD   Stopped at 12/30/23 1252    melatonin tablet 6 mg  6 mg Oral Nightly PRN Evert Murillo MD        metoprolol succinate (TOPROL-XL) 24 hr tablet 25 mg  25 mg Oral BID Evert Murillo MD   25 mg at 01/05/24 0903    morphine injection 2 mg  2 mg Intravenous Q2H PRN Jemima Juan Luis Q., NP        nitroGLYCERIN SL tablet 0.4 mg  0.4 mg Sublingual Q5 Min PRN Juan Luis Onofre Q., NP        ondansetron disintegrating tablet 8 mg  8 mg Oral Q8H PRN David Banegas Jr., MD        sodium chloride 0.9% flush 10  mL  10 mL Intravenous PRN Evert Murillo MD        sodium chloride 0.9% flush 10 mL  10 mL Intravenous PRN Heaven Hayward, ESCOBAR        traZODone tablet 100 mg  100 mg Oral QHS Evert Murillo MD   100 mg at 01/04/24 1957    zinc oxide-cod liver oil 40 % paste   Topical (Top) BID Josué Crooks MD   Given at 01/05/24 0904     Outpatient Medications as of 1/5/2024   Medication Sig Dispense Refill    amLODIPine (NORVASC) 10 MG tablet Take 1 tablet (10 mg total) by mouth once daily. 90 tablet 3    cinacalcet (SENSIPAR) 30 MG Tab Take 1 tablet (30 mg total) by mouth once daily. 30 tablet 3    DULoxetine (CYMBALTA) 60 MG capsule Take 1 capsule (60 mg total) by mouth every morning. (Patient taking differently: Take 60 mg by mouth 2 (two) times daily.) 30 capsule 11    gabapentin (NEURONTIN) 300 MG capsule Take 1 capsule (300 mg total) by mouth 2 (two) times daily. 60 capsule 11    levothyroxine (SYNTHROID) 75 MCG tablet Take 1 tablet (75 mcg total) by mouth once daily. 90 tablet 3    losartan (COZAAR) 25 MG tablet Take 0.5 tablets (12.5 mg total) by mouth once daily. 45 tablet 3    metoprolol succinate (TOPROL-XL) 25 MG 24 hr tablet Take 1 tablet (25 mg total) by mouth 2 (two) times daily. 180 tablet 1    pravastatin (PRAVACHOL) 20 MG tablet Take 20 mg by mouth once daily.      traZODone (DESYREL) 100 MG tablet Take 1 tablet (100 mg total) by mouth every evening. 30 tablet 11        Cardiovascular:    Cardiovascular Review: Within definable limits (WDL)    Telemetry: No    Rhythm:  NSR    AICD: No      Respiratory:    Oxygen:  2-3 liters nasal canula    CPT/Frequency: N/A    Incentive Spirometer/Frequency: N/A    CPAP/Settings: N/A    BiPAP/Settings: N/A    Oxygen Saturation: N/A    Suction Frequency: N/A      Vent Settings:   Mode:   Rate:   TV:   FIO2:   PEEP:   PS:   iTime:    Trial/HS Settings:   Mode:   Rate:   TV:   FIO2:   PEEP:   PS:       Nutrition:      Ht Readings from Last 3 Encounters:   12/29/23  "5' 1" (1.549 m)   12/19/23 5' 1" (1.549 m)   11/13/23 5' 1" (1.549 m)     Wt Readings from Last 1 Encounters:   12/29/23 1154 64.4 kg (142 lb)       Feeding Status:   Current Diet: Heart Healthy   Supplements: Boost BID   Tube Feeding: N/A   Flushes: N/A      Integumentary:    Integumentary: Within definable limits (WDL)              Altered Skin Integrity 01/03/24 Coccyx Moisture associated dermatitis (Active)   01/03/24    Altered Skin Integrity Present on Admission - Did Patient arrive to the hospital with altered skin?: yes   Side:    Orientation:    Location: Coccyx   Wound Number:    Is this injury device related?:    Primary Wound Type: Moisture ass   Description of Altered Skin Integrity:    Ankle-Brachial Index:    Pulses:    Removal Indication and Assessment:    Wound Outcome:    (Retired) Wound Length (cm):    (Retired) Wound Width (cm):    (Retired) Depth (cm):    Wound Description (Comments):    Removal Indications:    Wound Image   01/03/24 1056   Dressing Appearance Dry;Intact;Clean 01/05/24 0800   Drainage Amount None 01/05/24 0800   Drainage Characteristics/Odor No odor 01/05/24 0800   Appearance Intact;Pink 01/05/24 0800   Tissue loss description Partial thickness 01/05/24 0800   Red (%), Wound Tissue Color 100 % 01/03/24 1056   Periwound Area Moist;Redness 01/04/24 2000   Wound Edges Irregular 01/04/24 2000   Wound Length (cm) 0.5 cm 01/03/24 1056   Wound Width (cm) 0.3 cm 01/03/24 1056   Wound Surface Area (cm^2) 0.15 cm^2 01/03/24 1056   Care Cleansed with:;Sterile normal saline 01/04/24 2000   Dressing Applied;Other (comment) 01/04/24 2000   Number of days: 2            Incision/Site 01/04/24 2000 Right Groin anterior angiogram puncture (Active)   01/04/24 2000   Present Prior to Hospital Arrival?:    Side: Right   Location: Groin   Orientation: anterior   Incision Type: angiogram puncture   Closure Method:    Additional Comments:    Removal Indication and Assessment:    Wound Outcome:  "   Removal Indications:    Incision WDL WDL 01/05/24 0800   Dressing Appearance Dry;Intact;Clean 01/05/24 0800   Drainage Amount None 01/05/24 0800   Appearance Dressing in place, unable to visualize 01/05/24 0800   Periwound Area Intact;Dry 01/05/24 0800   Dressing Gauze;Transparent film 01/05/24 0800   Puncture Site Assessment Dressing clean;Dry and intact;Site without redness;Site without hematoma 01/05/24 0800   Number of days: 1         Musculoskeletal:    Transfer assist: Minimal Assistance    Weight Bearing Status: N/A    Comments: N/A    ADL Assist: Minimal Assistance    Special Equipment: walker    Radiology:    EXAMINATION:  XR CHEST 1 VIEW     CLINICAL HISTORY:  pneumonia, hypoxia;     TECHNIQUE:  AP chest     COMPARISON:  Chest x-ray dated 12/29/2023     FINDINGS:  The heart is normal in size.  There are increased interstitial markings in the right lung base, not significantly changed.  There is no new focal airspace consolidation.  There is no pleural effusion or visible pneumothorax.     Impression:     No acute abnormality of the chest.        Electronically signed by: Kamilla Son  Date:                                            01/02/2024  Time:                                           19:40      EXAMINATION:  CTA CHEST NON CORONARY (PE STUDIES)     CLINICAL HISTORY:  Pulmonary embolism (PE) suspected, positive D-dimer;     TECHNIQUE:  Axial images of the chest were obtained with contrast. Sagittal and coronal reconstructed images were available for review. 3-D MIP reconstructions were performed from source imaging.     Automatic dose control was utilized to reduce patient radiation dose.     DLP= 278     COMPARISON:  No prior imaging available for comparison.     FINDINGS:  PULMONARY ARTERY: No evidence of pulmonary thromboembolism.     AORTA: Normal in course and caliber.     THYROID GLAND: The visualized thyroid is unremarkable.     AIRWAYS: Trachea is midline and tracheobronchial tree is  patent.     HEART: The heart is normal in size. There is no pericardial effusion.     LUNGS: The left hemithorax is clear.  Consolidative changes of the right lower lobe with scattered ground-glass nodules throughout the right upper and middle lobe.  Associated right effusion is noted.     LYMPH NODES: Prominent mediastinal lymph nodes are likely reactive.     BONES: No displaced fracture. No aggressive appearing osseous lesion identified.  Irregularity with complete loss of 20 space at T1, T2, T3.     UPPER ABDOMEN:  The visualized abdomen is unremarkable.        Impression:     No evidence of pulmonary thromboembolism.  Findings of right-sided pneumonia.  Recommend continued follow-up.     Degenerative changes of the CS thoracic spine centered around T2.  If pain in this region recommend further evaluation with MRI.     Prominent mediastinal lymph nodes are likely reactive.  Recommend follow-up to resolution with CT in 3-6 months.        Electronically signed by: Nikolai Valle  Date:                                            12/31/2023  Time:                                           12:49             Cardiac catheterization    There is severe coronary artery disease.    Prox Left Anterior Descending had a 95% stenosis. The lesion was   successfully treated with overlapping 3.0X24MM and 3.0X12MM SYNERGY XD   drug-eluting stents. Post-dilatation was performed using an Anevia MR   3.57G60IQ angioplasty balloon inflated to 18 rajeev. Following intervention   there was 0% residual stenosis and ERON grade 3 flow in the distal vessel.    Intravascular Ultrasound (IVUS) was performed prior to intervention to   further characterize the lesion, as well as post-stent deployment to   ensure optimal stent apposition and expansion.    Mid Right Coronary Artery is chronically 100% occluded. The distal   vessel is filled via left-to-right collaterals.    The procedure log was documented by No documenter listed and verified by    David Banegas Jr, MD.    Date: 1/4/2024  Time: 3:16 PM    Procedure:  Left heart catheterization  Percutaneous coronary intervention     Preoperative diagnosis:  NSTEMI    Postoperative diagnosis:  Successful PCI     Access:  Right common femoral artery    Estimated blood loss: 10 cc  Complications: None     Summary:  Consent obtained. Risks and benefits discussed with the patient. The   patient was brought to the cath lab in a fasting state. The patient was   prepped and draped in usual sterile fashion. A preprocedure time-out was   performed.     A 5 Ghanaian JL4 catheter was used to cannulate the left main coronary   artery; angiography was performed, and multiple fluoroscopic views were   obtained.  A 5 Ghanaian JR4 catheter was used to cannulate the right coronary artery;   angiography was performed, and multiple fluoroscopic views were obtained.    The proximal LAD lesion was wired with a hyper coat run-through wire.    Balloon angioplasty was performed using a 2.75 by 15 mm emerge MR   compliant angioplasty balloon.  Subsequently, intravascular ultrasound was   performed to further characterize the lesion, and size the vessel for   stenting.  Using these measurements, a 3.0 x 24 mm synergy XD drug-eluting   stent was deployed across the lesion.  Intravascular ultrasound was again   performed, which did show some residual disease just distal to this stent.    Accordingly, a 3.0 x 12 mm synergy XD drug-eluting stent was deployed in   overlapping manner, just proximal to a large diagonal branch.    Subsequently, using the IVUS measurements obtained previously, balloon   angioplasty was performed using a 3.25 x 15 mm noncompliant angioplasty   balloon inflated to 18 atmospheres.  Intravascular ultrasound was then   performed one final time, which showed no evidence of edge dissection,   well-expanded and apposed stents.  A wire out shot was performed, which   showed excellent angiographic result and ERON grade 3  flow in the distal   vessel.    At the end the procedure, all wires and catheters were removed.   Hemostasis achieved with an Angioseal.     Findings:  - There is severe coronary artery disease.  - Prox Left Anterior Descending had a 95% stenosis. The lesion was   successfully treated with overlapping 3.0X24MM and 3.0X12MM SYNERGY XD   drug-eluting stents. Post-dilatation was performed using an NC EMERGE MR   3.64H37LH angioplasty balloon inflated to 18 rajeev. Following intervention   there was 0% residual stenosis and ERON grade 3 flow in the distal vessel.  - Intravascular Ultrasound (IVUS) was performed prior to intervention to   further characterize the lesion, as well as post-stent deployment to   ensure optimal stent apposition and expansion.  - Mid Right Coronary Artery is chronically 100% occluded.  The distal   vessel is filled via left-to-right collaterals.     Assessment/Plan:  - Patient is a 89 y.o. female presented with dyspnea on exertion.    Troponin was checked, and found to be significantly elevated.  She is   being treated by pulmonology for pneumonitis.  LHC showed severe CAD which   was treated as described above.  - Continue DAPT (aspirin + clopidogrel) for a minimum of 12 months and   aspirin indefinitely thereafter  - Continue high intensity statin  - Medical mgmt of [facility]     David Banegas MD  Interventional Cardiology/Structural Heart Disease  [facility] [place] [facility]      Lab/Cultures:           Comprehensive Metabolic Panel [[id number]] (Abnormal) Collected: 01/05/24 0930   Specimen: Blood Updated: 01/05/24 0956    Sodium Level 135 Low  mmol/L     Potassium Level 4.7 mmol/L     Chloride 97 Low  mmol/L     Carbon Dioxide 28 mmol/L     Glucose Level 136 High  mg/dL     Blood Urea Nitrogen 18.7 mg/dL     Creatinine 0.86 mg/dL     Calcium Level Total 8.1 Low  mg/dL     Protein Total 6.1 gm/dL     Albumin Level 3.0 Low  g/dL     Globulin 3.1 gm/dL     Albumin/Globulin Ratio 1.0  Low  ratio     Bilirubin Total 0.9 mg/dL     Alkaline Phosphatase 59 unit/L     Alanine Aminotransferase 44 unit/L     Aspartate Aminotransferase 56 High  unit/L     eGFR >60 mls/min/1.73/m2    CBC Auto Differential [[id number]] (Abnormal) Collected: 01/05/24 0930   Specimen: Blood Updated: 01/05/24 0949   Narrative:     The following orders were created for panel order CBC Auto Differential.  Procedure                               Abnormality         Status                    ---------                               -----------         ------                    CBC with Differential[[id number]]       Abnormal            Final result                Please view results for these tests on the individual orders.   CBC with Differential [[id number]] (Abnormal) Collected: 01/05/24 0930   Specimen: Blood Updated: 01/05/24 0949    WBC 10.21 x10(3)/mcL     RBC 4.03 Low  x10(6)/mcL     Hgb 12.7 g/dL     Hct 38.0 %     MCV 94.3 High  fL     MCH 31.5 High  pg     MCHC 33.4 g/dL     RDW 12.3 %     Platelet 417 High  x10(3)/mcL     MPV 8.8 fL     Neut % 71.8 %     Lymph % 18.1 %     Mono % 5.7 %     Eos % 2.3 %     Basophil % 0.5 %     Lymph # 1.85 x10(3)/mcL     Neut # 7.34 x10(3)/mcL     Mono # 0.58 x10(3)/mcL     Eos # 0.23 x10(3)/mcL     Baso # 0.05 x10(3)/mcL     IG# 0.16 High  x10(3)/mcL     IG% 1.6 %     NRBC% 0.0 %    Basic Metabolic Panel [[id number]] (Abnormal) Collected: 01/04/24 0321   Specimen: Blood Updated: 01/04/24 0428    Sodium Level 135 Low  mmol/L     Potassium Level 4.0 mmol/L     Chloride 96 Low  mmol/L     Carbon Dioxide 28 mmol/L     Glucose Level 90 mg/dL     Blood Urea Nitrogen 15.6 mg/dL     Creatinine 0.81 mg/dL     BUN/Creatinine Ratio 19    Calcium Level Total 8.0 Low  mg/dL     Anion Gap 11.0 mEq/L     eGFR >60 mls/min/1.73/m2    Magnesium [[id number]] (Normal) Collected: 01/04/24 0321   Specimen: Blood Updated: 01/04/24 0428    Magnesium Level 1.70 mg/dL    CBC Auto Differential [[id number]]  (Abnormal) Collected: 01/04/24 0321   Specimen: Blood Updated: 01/04/24 0413   Narrative:     The following orders were created for panel order CBC Auto Differential.  Procedure                               Abnormality         Status                    ---------                               -----------         ------                    CBC with Differential[[id number]]       Abnormal            Final result                Please view results for these tests on the individual orders.   CBC with Differential [[id number]] (Abnormal) Collected: 01/04/24 0321   Specimen: Blood Updated: 01/04/24 0413    WBC 9.11 x10(3)/mcL     RBC 3.91 Low  x10(6)/mcL     Hgb 12.3 g/dL     Hct 37.2 %     MCV 95.1 High  fL     MCH 31.5 High  pg     MCHC 33.1 g/dL     RDW 12.1 %     Platelet 309 x10(3)/mcL     MPV 9.1 fL     Neut % 63.6 %     Lymph % 21.6 %     Mono % 10.4 %     Eos % 2.7 %     Basophil % 0.3 %     Lymph # 1.97 x10(3)/mcL     Neut # 5.78 x10(3)/mcL     Mono # 0.95 x10(3)/mcL     Eos # 0.25 x10(3)/mcL     Baso # 0.03 x10(3)/mcL     IG# 0.13 High  x10(3)/mcL     IG% 1.4 %     NRBC% 0.0 %          Blood Urea Nitrogen   Date Value Ref Range Status   01/05/2024 18.7 9.8 - 20.1 mg/dL Final   01/04/2024 15.6 9.8 - 20.1 mg/dL Final     Creatinine   Date Value Ref Range Status   01/05/2024 0.86 0.55 - 1.02 mg/dL Final   01/04/2024 0.81 0.55 - 1.02 mg/dL Final     WBC   Date Value Ref Range Status   01/05/2024 10.21 4.50 - 11.50 x10(3)/mcL Final   01/04/2024 9.11 4.50 - 11.50 x10(3)/mcL Final   01/01/2024 9.16 x10(3)/mcL Final   12/31/2023 11.21 x10(3)/mcL Final      CULTURE, BLOOD ([facility])   Date Value Ref Range Status   12/30/2023 No Growth at 5 days  Final   12/30/2023 No Growth at 5 days  Final     Urine Culture, Routine   Date Value Ref Range Status   01/17/2023 Final report (A)  Final     Urine Culture   Date Value Ref Range Status   08/29/2023 >/= 100,000 colonies/ml Escherichia coli (A)  Final     Recent Labs      01/02/24 2026   PH 7.650*   PCO2 27.0*   PO2 129.0*   HCO3 29.7*

## 2024-01-05 NOTE — PROGRESS NOTES
[facility]  Hospital Medicine Progress Note        CHIEF COMPLAINT   Cough (C/o cough worsening since yesterday with pulse ox reading at 86% on room air per Home Health just PTA. Denies SOB or known fever. O2 89% on RA in triage. 95% on 3L NC.)     HISTORY OF PRESENT ILLNESS:   Patient is a very pleasant 89-year-old female with past medical history as below including chronic kidney disease, hypertension, hyperlipidemia, anxiety, fibromyalgia, hypothyroidism who presented to the ER with complaints of worsening cough over the last 48 hours and a low home O2 reading per home health with home sat being 86% on room air.     She arrived to the ER requiring 3 L nasal cannula to maintain adequate sats, but afebrile hemodynamically stable.  Laboratory work showed a mild leukocytosis 14 K, a BNP of 840, a troponin of 1.9.  EKG showed sinus rhythm with RBBB.  Chest x-ray showed right-sided infiltrates.  She was seen by Cardiology in the ER and started on a heparin drip, she was given empiric antibiotics admitted to the hospitalist service for community-acquired pneumonia and NSTEMI.        Patient currently admitted for acute hypoxic respiratory failure due to adenovirus with bacterial coinfection pneumonia complicated by NSTEMI 1 versus 2, on heparin drip per CIS recommendations , s/p LHC on 1/4- with PCI to LAD , on DAPT        Today's information   Patient seen and examined at bedside, no family at bedside  - no new complaints   - s/p LHC on 1/4- with PCI to LAD , on DAPT   - consult palliative care for GOC        Exam  GENERAL: awake, alert, oriented and in no acute distress, non-toxic appearing   HEENT: normocephalic atraumatic   NECK: supple   LUNGS:  Bilateral wheezing/rhonchi, no wheezing or rales, no accessory muscle use   CVS: Regular rate and rhythm, normal peripheral perfusion  ABD: Soft, non-tender, non-distended, bowel sounds present  EXTREMITIES: no clubbing or cyanosis  SKIN: Warm,  dry.   NEURO: alert and oriented, grossly without focal deficits   PSYCHIATRIC: Cooperative        ASSESSMENT & PLAN:   Community-acquired pneumonia -completed antibiotics   Acute hypoxemic respiratory failure 2/2 above   NSTEMI, likely type 1  CAD - PCI to LAD 1/4  Leukocytosis , resolved   Adenovirus infection  Cough   Atypical chest pain   Hypertension, controlled   Low magnesium levels   Grade 2 diastolic dysfunction        HX: Anxiety, fibromyalgia, HTN, hypothyroidism, hyperlipidemia      Plan  - s/p LHC on 1/4- with PCI to LAD , on DAPT , statin   - f/up cis recs  - consult palliative care for GOC   Patient continues to require 2L of oxygen  CTA chest was done with no pulmonary embolism reactive LN, recommend repeat CT in 3-6 m  Leukocytosis now resolved  Evaluated by pulmonology 1/3 recommend cardiac eval  Blood cultures negative  Continue oxygen supplementation to goal SpO2 greater than 94%   Provide with incentive spirometry   Received ceftriaxone and azithromycin for 5 days   Completed IV  Lasix 20 mg daily for 3 days     DVT prophylaxis: sc lovenox  Code status: full         Dispo- plans for SNF       VITAL SIGNS: 24 HRS MIN & MAX LAST   Temp  Min: 97.5 °F (36.4 °C)  Max: 98.8 °F (37.1 °C) 98.2 °F (36.8 °C)   BP  Min: 116/74  Max: 148/72 116/74   Pulse  Min: 66  Max: 93  77   Resp  Min: 16  Max: 20 20   SpO2  Min: 91 %  Max: 97 % (!) 93 %     I have reviewed the following labs:  Recent Labs   Lab 01/03/24 0422 01/04/24 0321 01/05/24  0930   WBC 10.75 9.11 10.21   RBC 4.03* 3.91* 4.03*   HGB 12.8 12.3 12.7   HCT 37.5 37.2 38.0   MCV 93.1 95.1* 94.3*   MCH 31.8* 31.5* 31.5*   MCHC 34.1 33.1 33.4   RDW 11.9 12.1 12.3    309 417*   MPV 9.1 9.1 8.8     Recent Labs   Lab 01/01/24  0432 01/02/24 0325 01/02/24 2026 01/03/24  0422 01/04/24  0321 01/05/24  0930   * 134*  --  135* 135* 135*   K 3.6 3.9  --  4.0 4.0 4.7   CO2 26 24  --  31 28 28   BUN 17.7 17.1  --  15.4 15.6 18.7   CREATININE 0.72  0.78  --  0.80 0.81 0.86   CALCIUM 7.6* 8.4  --  7.7* 8.0* 8.1*   PH  --   --  7.650*  --   --   --    MG 1.80 1.80  --   --  1.70  --    ALBUMIN 2.4* 3.0*  --   --   --  3.0*   ALKPHOS 54 69  --   --   --  59   ALT 29 36  --   --   --  44   AST 48* 47*  --   --   --  56*   BILITOT 0.4 0.5  --   --   --  0.9     Microbiology Results (last 7 days)       Procedure Component Value Units Date/Time    Blood Culture [[id number]]  (Normal) Collected: 12/30/23 0012    Order Status: Completed Specimen: Blood, Venous Updated: 01/04/24 0101     CULTURE, BLOOD ([facility]) No Growth at 5 days    Blood Culture [[id number]]  (Normal) Collected: 12/30/23 0012    Order Status: Completed Specimen: Blood, Venous Updated: 01/04/24 0101     CULTURE, BLOOD ([facility]) No Growth at 5 days    Respiratory Culture [[id number]]     Order Status: Sent Specimen: Sputum, Expectorated              See below for Radiology    Scheduled Med:   albuterol-ipratropium  3 mL Nebulization Q6H    amLODIPine  10 mg Oral Daily    aspirin  81 mg Oral Daily    atorvastatin  40 mg Oral Daily    cinacalcet  30 mg Oral Daily    clopidogreL  75 mg Oral Daily    DULoxetine  60 mg Oral BID    enoxaparin  1 mg/kg Subcutaneous Q12H    gabapentin  300 mg Oral BID    levothyroxine  75 mcg Oral Daily    losartan  12.5 mg Oral Daily    metoprolol succinate  25 mg Oral BID    traZODone  100 mg Oral QHS    zinc oxide-cod liver oil   Topical (Top) BID      Continuous Infusions:     PRN Meds:  sodium chloride 0.9%, acetaminophen, acetaminophen, acetaminophen-codeine 300-30mg, diclofenac sodium, hydrALAZINE, melatonin, morphine, nitroGLYCERIN, ondansetron, sodium chloride 0.9%, sodium chloride 0.9%     Assessment/Plan:      VTE prophylaxis:     Patient condition:  Stable/Fair/Guarded/ Serious/ Critical    Anticipated discharge and Disposition:         All diagnosis and differential diagnosis have been reviewed; assessment and plan has been documented; I have personally reviewed the labs  and test results that are presently available; I have reviewed the patients medication list; I have reviewed the consulting providers response and recommendations. I have reviewed or attempted to review medical records based upon their availability    All of the patient's questions have been  addressed and answered. Patient's is agreeable to the above stated plan. I will continue to monitor closely and make adjustments to medical management as needed.  _____________________________________________________________________    Nutrition Status:    Radiology:  I have personally reviewed the following imaging and agree with the radiologist.     Cardiac catheterization    There is severe coronary artery disease.    Prox Left Anterior Descending had a 95% stenosis. The lesion was   successfully treated with overlapping 3.0X24MM and 3.0X12MM SYNERGY XD   drug-eluting stents. Post-dilatation was performed using an [employer] MR   3.86D31NS angioplasty balloon inflated to 18 rajeev. Following intervention   there was 0% residual stenosis and ERON grade 3 flow in the distal vessel.    Intravascular Ultrasound (IVUS) was performed prior to intervention to   further characterize the lesion, as well as post-stent deployment to   ensure optimal stent apposition and expansion.    Mid Right Coronary Artery is chronically 100% occluded. The distal   vessel is filled via left-to-right collaterals.    The procedure log was documented by No documenter listed and verified by   David Banegas Jr, MD.    Date: 1/4/2024  Time: 3:16 PM    Procedure:  Left heart catheterization  Percutaneous coronary intervention     Preoperative diagnosis:  NSTEMI    Postoperative diagnosis:  Successful PCI     Access:  Right common femoral artery    Estimated blood loss: 10 cc  Complications: None     Summary:  Consent obtained. Risks and benefits discussed with the patient. The   patient was brought to the cath lab in a fasting state. The patient was    prepped and draped in usual sterile fashion. A preprocedure time-out was   performed.     A 5 Kosovan JL4 catheter was used to cannulate the left main coronary   artery; angiography was performed, and multiple fluoroscopic views were   obtained.  A 5 Kosovan JR4 catheter was used to cannulate the right coronary artery;   angiography was performed, and multiple fluoroscopic views were obtained.    The proximal LAD lesion was wired with a hyper coat run-through wire.    Balloon angioplasty was performed using a 2.75 by 15 mm [employer] MR   compliant angioplasty balloon.  Subsequently, intravascular ultrasound was   performed to further characterize the lesion, and size the vessel for   stenting.  Using these measurements, a 3.0 x 24 mm synergy XD drug-eluting   stent was deployed across the lesion.  Intravascular ultrasound was again   performed, which did show some residual disease just distal to this stent.    Accordingly, a 3.0 x 12 mm synergy XD drug-eluting stent was deployed in   overlapping manner, just proximal to a large diagonal branch.    Subsequently, using the IVUS measurements obtained previously, balloon   angioplasty was performed using a 3.25 x 15 mm noncompliant angioplasty   balloon inflated to 18 atmospheres.  Intravascular ultrasound was then   performed one final time, which showed no evidence of edge dissection,   well-expanded and apposed stents.  A wire out shot was performed, which   showed excellent angiographic result and ERON grade 3 flow in the distal   vessel.    At the end the procedure, all wires and catheters were removed.   Hemostasis achieved with an Angioseal.     Findings:  - There is severe coronary artery disease.  - Prox Left Anterior Descending had a 95% stenosis. The lesion was   successfully treated with overlapping 3.0X24MM and 3.0X12MM SYNERGY XD   drug-eluting stents. Post-dilatation was performed using an [employer] MR   3.07V16JG angioplasty balloon inflated to 18 rajeev.  Following intervention   there was 0% residual stenosis and ERON grade 3 flow in the distal vessel.  - Intravascular Ultrasound (IVUS) was performed prior to intervention to   further characterize the lesion, as well as post-stent deployment to   ensure optimal stent apposition and expansion.  - Mid Right Coronary Artery is chronically 100% occluded.  The distal   vessel is filled via left-to-right collaterals.     Assessment/Plan:  - Patient is a 89 y.o. female presented with dyspnea on exertion.    Troponin was checked, and found to be significantly elevated.  She is   being treated by pulmonology for pneumonitis.  LHC showed severe CAD which   was treated as described above.  - Continue DAPT (aspirin + clopidogrel) for a minimum of 12 months and   aspirin indefinitely thereafter  - Continue high intensity statin  - Medical mgmt of [facility]     David Banegas MD  Interventional Cardiology/Structural Heart Disease  [facility] [place] [facility]      Josué Crooks MD   01/05/2024

## 2024-01-05 NOTE — CONSULTS
Patient Name: Edwin Boyer   MRN: [id number]   Admission Date: 12/29/2023   Hospital Length of Stay: 7   Attending Provider: Josué Crooks MD   Consulting Provider: Francis Son M.D.  Resident: Milton Brumfield MD  Reason for Consult: Goals of Care  Primary Care Physician: Mariely Soto MD     Principal Problem: NSTEMI (non-ST elevated myocardial infarction)     Patient information was obtained from patient, relative(s), ER records, and primary team.      Final diagnoses:  [R06.02] Shortness of breath  [I21.4] NSTEMI (non-ST elevated myocardial infarction) (Primary)  [J18.9] Community acquired pneumonia, unspecified laterality     Assessment/Plan:     We reviewed the patient and family's understanding of the seriousness of the illness and its expected prognosis. We discussed the patient's goals of care and treatment preferences. We discussed the difference between palliative and curative medicine. We explained the differences between home health, palliative and hospice care. We clarified current code status. We identified the surrogate decision makers (patient's four children). We explained the difference between a living will (advanced directive) and LaPost. We discussed the patient's chosen code status as listed above and the contents of the LaPOST. We answered all questions and we formulated a plan including recommendations for symptom management and how to best achieve goals of care.     Prior to this admission, patient was independent, living alone. She has three children who live nearby who help take care of her and a fourth child living out of state. She does have home health who comes once a week. She ambulates with a walker. She follows Dr. Mariely Soto who has discussed advanced directives and a living will. Patient states that she has not completed the living will but is in the process of doing so. Regardless, patient states that she and her family have discussed her code status and that she has  elected to be DNR and allow for a natural death.     We provided patient and her family with a copy of LaPOST to complete if so desired.    We reviewed the patient's current clinical status with the nurse. We reviewed clinical documentation, labs and imaging.     Symptom management review: Currently without dyspnea, chest pain, constipation, or nausea    Advance Care Planning     Date: 01/05/2024    Code Status  In light of the patients advanced and life limiting illness,I engaged the the patient and family in a voluntary conversation about the patient's preferences for care  at the very end of life. The patient wishes to have a natural, peaceful death.  Along those lines, the patient does not wish to have CPR or other invasive treatments performed when her heart and/or breathing stops. I communicated to the patient and family that a DNR form was completed [facility]  I engaged the patient and family in a voluntary conversation about advance care planning and we specifically addressed what the goals of care would be moving forward, in light of the patient's change in clinical status, specifically cardiac arrest.  We did specifically address the patient's likely prognosis, which is fair .  We explored the patient's values and preferences for future care.  The patient and family endorses that what is most important right now is to focus on remaining as independent as possible    Accordingly, we have decided that the best plan to meet the patient's goals includes continuing with treatment           Disposition: Likely SNF    History of Present Illness:     88 y/o female with PMH of HTN, HLD, CKD, fibromyalgia, hypothyroidism who presented to [facility] with cough and hypoxia per home spo2 reading. Was found to have a pneumonia and NSTEMI. She was treated with antibiotics for her pneumonia x5 days. She did also have a LHC 1/4/23 which revealed multi-vessel CAD, and had PCI of her LAD. Palliative  care was consulted for goals of care discussion.      Active Ambulatory Problems     Diagnosis Date Noted    Hypothyroidism 2022    HTN (hypertension), benign 2022    Hyperlipidemia 2022    GERD with esophagitis 2022    OA (osteoarthritis) 2022    Fibromyalgia 2022    Insomnia 2022    Osteoporosis 2022    CKD (chronic kidney disease) stage 3, GFR 30-59 ml/min 2022    Anxiety 2022    Hypercalcemia 2022    Urinary incontinence 2022    Primary hyperparathyroidism 2023    Carpal tunnel syndrome 2023    Need for influenza vaccination 2023    Peripheral polyneuropathy 2023     Resolved Ambulatory Problems     Diagnosis Date Noted    No Resolved Ambulatory Problems     Past Medical History:   Diagnosis Date    Carpal tunnel syndrome, bilateral     Chronic back pain         Past Surgical History:   Procedure Laterality Date    APPENDECTOMY       SECTION      COLONOSCOPY      HYSTERECTOMY      LEFT HEART CATHETERIZATION N/A 2024    Procedure: Left heart cath;  Surgeon: David Banegas Jr., MD;  Location: [facility] CATH LAB;  Service: Cardiology;  Laterality: N/A;    TOTAL KNEE ARTHROPLASTY          Review of patient's allergies indicates:   Allergen Reactions    Meperidine      Other reaction(s): EXCITATIVE TYPE PSYCHOSIS    Sulfamethoxazole           Current Facility-Administered Medications:     0.9%  NaCl infusion, , Intravenous, PRN, Brendon Portillo MD, Stopped at 23 1658    acetaminophen tablet 650 mg, 650 mg, Oral, Q8H PRN, Evert Murillo MD, 650 mg at 24 2045    acetaminophen tablet 650 mg, 650 mg, Oral, Q8H PRN, Evert Murillo MD    acetaminophen-codeine 300-30mg per tablet 1 tablet, 1 tablet, Oral, Daily PRN, Evert Murillo MD    albuterol-ipratropium 2.5 mg-0.5 mg/3 mL nebulizer solution 3 mL, 3 mL, Nebulization, Q6H, Warner Campos MD, 3 mL at 24 0731    amLODIPine  tablet 10 mg, 10 mg, Oral, Daily, Evert Murillo MD, 10 mg at 01/05/24 0903    aspirin chewable tablet 81 mg, 81 mg, Oral, Daily, Bakari Murillo ANP, 81 mg at 01/05/24 0903    atorvastatin tablet 40 mg, 40 mg, Oral, Daily, Bakari Murillo ANP, 40 mg at 01/05/24 0903    cinacalcet tablet 30 mg, 30 mg, Oral, Daily, Evert Murillo MD, 30 mg at 01/05/24 0903    clopidogreL tablet 75 mg, 75 mg, Oral, Daily, Heaven Hayward NP, 75 mg at 01/05/24 0903    diclofenac sodium 1 % gel 2 g, 2 g, Topical (Top), TID PRN, Reyes, Thairy G, DO    DULoxetine DR capsule 60 mg, 60 mg, Oral, BID, Evert Murillo MD, 60 mg at 01/05/24 0903    enoxaparin injection 60 mg, 1 mg/kg, Subcutaneous, Q12H, Jemima, Juan Luis Q., NP, 60 mg at 01/05/24 0902    gabapentin capsule 300 mg, 300 mg, Oral, BID, Evert Murillo MD, 300 mg at 01/05/24 0903    hydrALAZINE injection 10 mg, 10 mg, Intravenous, Q4H PRN, David Banegas Jr., MD    levothyroxine tablet 75 mcg, 75 mcg, Oral, Daily, Evert Murillo MD, 75 mcg at 01/05/24 0903    losartan split tablet 12.5 mg, 12.5 mg, Oral, Daily, Evert Murillo MD, 12.5 mg at 01/05/24 0903    melatonin tablet 6 mg, 6 mg, Oral, Nightly PRN, Evert Murillo MD    metoprolol succinate (TOPROL-XL) 24 hr tablet 25 mg, 25 mg, Oral, BID, Evert Murillo MD, 25 mg at 01/05/24 0903    morphine injection 2 mg, 2 mg, Intravenous, Q2H PRN, Jemima, Juan Luis Q., NP    nitroGLYCERIN SL tablet 0.4 mg, 0.4 mg, Sublingual, Q5 Min PRN, Juan Luis Onofre, NP    ondansetron disintegrating tablet 8 mg, 8 mg, Oral, Q8H PRN, David Banegas Jr., MD    sodium chloride 0.9% flush 10 mL, 10 mL, Intravenous, PRN, Evert Murillo MD    sodium chloride 0.9% flush 10 mL, 10 mL, Intravenous, PRN, Heaven Hayward, NP    traZODone tablet 100 mg, 100 mg, Oral, QHS, Evert Murillo MD, 100 mg at 01/04/24 1957    zinc oxide-cod liver oil 40 % paste, , Topical (Top), BID, Amusa,  "Josué NUNEZ MD, Given at 01/05/24 0904     sodium chloride 0.9%, acetaminophen, acetaminophen, acetaminophen-codeine 300-30mg, diclofenac sodium, hydrALAZINE, melatonin, morphine, nitroGLYCERIN, ondansetron, sodium chloride 0.9%, sodium chloride 0.9%     Family History   Problem Relation Age of Onset    No Known Problems Mother     No Known Problems Father         Review of Systems   Respiratory:  Positive for cough. Negative for shortness of breath.    Cardiovascular:  Negative for chest pain and leg swelling.   Gastrointestinal:  Negative for abdominal pain and constipation.            Objective:   /74   Pulse 77   Temp 98.2 °F (36.8 °C) (Axillary)   Resp 20   Ht 5' 1" (1.549 m)   Wt 64.4 kg (142 lb)   SpO2 (!) 93%   Breastfeeding No   BMI 26.83 kg/m²      Physical Exam  Constitutional:       General: She is not in acute distress.     Appearance: Normal appearance.   HENT:      Head: Normocephalic and atraumatic.      Right Ear: External ear normal.      Left Ear: External ear normal.      Nose: Nose normal.      Mouth/Throat:      Mouth: Mucous membranes are moist.   Eyes:      Extraocular Movements: Extraocular movements intact.      Conjunctiva/sclera: Conjunctivae normal.      Pupils: Pupils are equal, round, and reactive to light.   Cardiovascular:      Rate and Rhythm: Normal rate and regular rhythm.      Heart sounds: No murmur heard.     No friction rub. No gallop.   Pulmonary:      Effort: Pulmonary effort is normal. No respiratory distress.      Breath sounds: Normal breath sounds.   Abdominal:      General: There is no distension.      Palpations: Abdomen is soft.      Tenderness: There is no abdominal tenderness.   Musculoskeletal:      Cervical back: Normal range of motion.      Right lower leg: No edema.      Left lower leg: No edema.   Skin:     General: Skin is warm and dry.   Neurological:      General: No focal deficit present.      Mental Status: She is alert and oriented to person, " place, and time. Mental status is at baseline.            Review of Symptoms  Review of Symptoms      Symptom Assessment (ESAS 0-10 Scale)     CAM / Delirium:  Negative    Constipation:  No constipation    Performance Status:  60    Living Arrangements:  Lives alone    Psychosocial/Cultural:   See Palliative Psychosocial Note: No  Patient lives alone, but has 3 children who live nearby that visit her daily  **Primary  to Follow**  Palliative Care  Consult: No    Spiritual:  F - Kathleen and Belief:  Religious,  has been visiting with her     Time-Based Charting:  Yes    Total Time Spent: 0 minutes      Advance Care Planning   Advance Directives:   Living Will: No    LaPOST: No    Medical Power of : No    Agent's Name:  Eliza Jackmanr   Agent's Contact Number:  [phone]    Decision Making:  Patient answered questions  Goals of Care: The patient and family endorses that what is most important right now is to focus on remaining as independent as possible and symptom/pain control    Accordingly, we have decided that the best plan to meet the patient's goals includes continuing with treatment          Caregiver burden formerly assessed: Yes       Milton Brumfield MD  [facility], PGY3

## 2024-01-05 NOTE — PROGRESS NOTES
GENERAL: awake, alert, oriented and in no acute distress, non-toxic appearing   HEENT: normocephalic atraumatic   NECK: supple   LUNGS:  Bilateral wheezing/rhonchi, no wheezing or rales, no accessory muscle use   CVS: Regular rate and rhythm, normal peripheral perfusion  ABD: Soft, non-tender, non-distended, bowel sounds present  EXTREMITIES: no clubbing or cyanosis     Looking quite jovial in NAD  O2 acceptable    Successful PCI to LAD lesion    - There is severe coronary artery disease.  - Prox Left Anterior Descending had a 95% stenosis. The lesion was   successfully treated with overlapping 3.0X24MM and 3.0X12MM SYNERGY XD   drug-eluting stents. Post-dilatation was performed using an [employer] MR   [id number] angioplasty balloon inflated to 18 rajeev. Following intervention   there was 0% residual stenosis and ERON grade 3 flow in the distal vessel.  - Intravascular Ultrasound (IVUS) was performed prior to intervention to   further characterize the lesion, as well as post-stent deployment to   ensure optimal stent apposition and expansion.  - Mid Right Coronary Artery is chronically 100% occluded.  The distal   vessel is filled via left-to-right collaterals.    Dispo- plans for SNF         VITAL SIGNS: 24 HRS MIN & MAX LAST   Temp  Min: 97.5 °F (36.4 °C)  Max: 98.8 °F (37.1 °C) 98.2 °F (36.8 °C)   BP  Min: 116/74  Max: 148/72 116/74   Pulse  Min: 66  Max: 93  77   Resp  Min: 16  Max: 20 20   SpO2  Min: 91 %  Max: 97 % (!) 93 %      I have reviewed the following labs:        Recent Labs   Lab 01/03/24  0422 01/04/24  0321 01/05/24  0930   WBC 10.75 9.11 10.21   RBC 4.03* 3.91* 4.03*   HGB 12.8 12.3 12.7   HCT 37.5 37.2 38.0   MCV 93.1 95.1* 94.3*   MCH 31.8* 31.5* 31.5*   MCHC 34.1 33.1 33.4   RDW 11.9 12.1 12.3    309 417*   MPV 9.1 9.1 8.8               Recent Labs   Lab 01/01/24  0432 01/02/24  0325 01/02/24 2026 01/03/24  0422 01/04/24  0321 01/05/24  0930   * 134*  --  135* 135* 135*   K 3.6 3.9   --  4.0 4.0 4.7   CO2 26 24  --  31 28 28   BUN 17.7 17.1  --  15.4 15.6 18.7   CREATININE 0.72 0.78  --  0.80 0.81 0.86   CALCIUM 7.6* 8.4  --  7.7* 8.0* 8.1*   PH  --   --  7.650*  --   --   --    MG 1.80 1.80  --   --  1.70  --    ALBUMIN 2.4* 3.0*  --   --   --  3.0*   ALKPHOS 54 69  --   --   --  59   ALT 29 36  --   --   --  44   AST 48* 47*  --   --   --  56*   BILITOT 0.4 0.5  --   --   --  0.9      Microbiology Results (last 7 days)      Good she did well  Best of luck with this sweet lady!

## 2024-01-05 NOTE — PROGRESS NOTES
"    [facility]    Cardiology  Progress Note    Patient Name: Edwin Boyer  MRN: [id number]  Admission Date: 12/29/2023  Hospital Length of Stay: 7 days  Code Status: DNR   Attending Provider: Josué Crooks MD   Consulting Provider: KRISTIN Elizabeth  Primary Care Physician: Mariely Soto MD  Principal Problem:NSTEMI (non-ST elevated myocardial infarction)    Patient information was obtained from patient, past medical records, and ER records.     Subjective:     Reason for Consult: Elevated Troponin      HPI: Ms. Boyer is an 88 y/o female who is known to [place], Dr. Guidry. The patient presented to [facility] ER on 12.29.23 with c/o a Cough. She reported that about 2 days prior she developed a cough which progressively worsened and her Pulse Oxymetry at Home was 86% on RA. She denied CP, Fever, Chills and SOB. Reported Mild SOB at Rest. Secondary to these findings and symptoms she presented to the ER for evaluation. Upon Evaluation in the ER she was found to have an RA O2 saturation in the Low 80s and began to endorse CP with a Cough. The CP was not presented unless she was coughing. The Pain was 8/10 on a verbal scale with coughing and described as a "sharp pain". She reported that the cough was productive and yielded a yellow/green sputum. In the ER she was found to have a WBC 14.70, Na 130, K 3.6, Cl 95, .2, Troponin 1.936, and Negative Flu A/B and COVID Screenings. She was started on IV Abx and ASA 325mg x 1. [facility] has been consulted for NSTEMI in the Setting of Pneumonia.     Hospital Course:   12.30.23: NAD. "I am ok." Denies CP and SOB.   12.31.23: NAD. "I am feeling better today." Denies CP and SOB. VSS.  1.1.24: NAD. "I am good." Denies CP, SOB and Palps. VSS. Na 130.  1.2.24: NAD, VSS. Family wishes to proceed with Stress test. She denies SOB or CP  1.3.24: NAD, She denies SOB, CP, or palpitations. Family is reluctant to proceed with [facility] at this time, will elect for medical " "management.   23: NAD, VSS. Patient denies SOB or CP. Family is now willing to proceed with [facility] after discussion with pulmonary.   24: NAD. VSS. Denies CP/SOB/Palps. "I feel okay" [facility] yesterday. Site look great.     PMH: Anxiety, Carpel Tunnel Syndrome Bilaterally, CKD Stage III, Fibromyalgia, GERD, HTN, HLD, Hypothyroidism, OA, Osteoporosis, Hyperparathyroidism, Urinary Incontinence, VHD (Mod MR)  PSH: Appendectomy, , Colonoscopy, [place], TKA, Breast Biopsy,   Family History: Father, D, H; Mother, D, HF  Social History: Denies Illicit Drug, ETOH and Tobacco Use    Previous Cardiac Diagnostics:   [facility] (24):  There is severe coronary artery disease. Prox Left Anterior Descending had a 95% stenosis. The lesion was successfully treated with overlapping 3.0X24MM and 3.0X12MM SYNERGY XD drug-eluting stents. Post-dilatation was performed using an [employer] MR [id number] angioplasty balloon inflated to 18 rajeev. Following intervention there was 0% residual stenosis and ERON grade 3 flow in the distal vessel. Intravascular Ultrasound (IVUS) was performed prior to intervention to further characterize the lesion, as well as post-stent deployment to ensure optimal stent apposition and expansion. Mid Right Coronary Artery is chronically 100% occluded. The distal vessel is filled left to right collaterals.     SPECT 24:  Abnormal myocardial perfusion scan.  moderate intensity, moderate sized,  in the basal to apical inferoapical wall(s) in the typical distribution of the RCA territory.  There are no other significant perfusion abnormalities.  The gated perfusion images showed an ejection fraction of 64% at rest.  The ECG portion of the study is abnormal but not diagnostic.    ECHO 23:  Left Ventricle: The left ventricle is normal in size. Normal wall thickness. Normal wall motion. Mid septal wall is slightly hypokinetic. There is normal systolic function with a visually estimated ejection fraction of 55 - " 60%. Grade II diastolic dysfunction.  Right Ventricle: Normal right ventricular cavity size. Systolic function is normal.  Left Atrium: Left atrium is dilated.  Aortic Valve: The aortic valve is a trileaflet valve. There is moderate aortic valve sclerosis. Mildly calcified cusps. There is no stenosis. Aortic valve peak velocity is 1.55 m/s. Mean gradient is 5 mmHg. There is mild aortic regurgitation.  Mitral Valve: Moderately thickened leaflets. There is mild mitral annular calcification present. There is mild stenosis. The mean pressure gradient across the mitral valve is 5 mmHg at a heart rate of 79 bpm. There is mild regurgitation.  Tricuspid Valve: There is no stenosis. There is mild regurgitation.  Pulmonic Valve: There is no stenosis.    ECHO 5.3.23:  The study quality is average.   The left ventricle is normal in size. Global left ventricular systolic function is normal. The left ventricular ejection fraction is 60%. Left ventricular diastolic function is normal. Mild asymmetric septal left ventricular hypertrophy is present.  The left atrial diameter is mildly increased.  Moderate (2+) mitral regurgitation. PISA is 0.54 cm. The MR volume color doppler method is 20 ml. EROA measures 0.1 cm². Vena contracta is 4.5 mm.   Mild (1+) tricuspid regurgitation. Mild (1+) aortic regurgitation. Mild (1+) pulmonic regurgitation.   The estimated pulmonary artery systolic pressure is 39 mmHg assuming a right atrial pressure of 3 mmHg.     Calcium Score 5.18.18:  Total Calcium Score 299    MPI 10.26.11:  This is a normal perfusion study, no perfusion defects noted.   This scan is suggestive of low risk for future cardiovascular events.  The left ventricular cavity is noted to be normal on the stress study. The left ventricular ejection fraction was calculated to be 74% and left ventricular global function is normal.   The study quality is good.    Review of patient's allergies indicates:   Allergen Reactions    Meperidine       Other reaction(s): EXCITATIVE TYPE PSYCHOSIS    Sulfamethoxazole      No current facility-administered medications on file prior to encounter.     Current Outpatient Medications on File Prior to Encounter   Medication Sig    acetaminophen-codeine 300-30mg (TYLENOL #3) 300-30 mg Tab Take 1 tablet by mouth daily as needed (pain).    amLODIPine (NORVASC) 10 MG tablet Take 1 tablet (10 mg total) by mouth once daily.    cinacalcet (SENSIPAR) 30 MG Tab Take 1 tablet (30 mg total) by mouth once daily.    DULoxetine (CYMBALTA) 60 MG capsule Take 1 capsule (60 mg total) by mouth every morning. (Patient taking differently: Take 60 mg by mouth 2 (two) times daily.)    gabapentin (NEURONTIN) 300 MG capsule Take 1 capsule (300 mg total) by mouth 2 (two) times daily.    levothyroxine (SYNTHROID) 75 MCG tablet Take 1 tablet (75 mcg total) by mouth once daily.    losartan (COZAAR) 25 MG tablet Take 0.5 tablets (12.5 mg total) by mouth once daily.    metoprolol succinate (TOPROL-XL) 25 MG 24 hr tablet Take 1 tablet (25 mg total) by mouth 2 (two) times daily.    pravastatin (PRAVACHOL) 20 MG tablet Take 20 mg by mouth once daily.    traZODone (DESYREL) 100 MG tablet Take 1 tablet (100 mg total) by mouth every evening.     Review of Systems   Constitutional:  Negative for chills and fever.   Respiratory:  Negative for shortness of breath.    Cardiovascular:  Negative for chest pain, palpitations and leg swelling.   Psychiatric/Behavioral:  Positive for confusion.    All other systems reviewed and are negative.    Objective:     Vital Signs (Most Recent):  Temp: 98.3 °F (36.8 °C) (01/05/24 1509)  Pulse: 75 (01/05/24 1509)  Resp: 18 (01/05/24 1252)  BP: 128/71 (01/05/24 1509)  SpO2: (!) 93 % (01/05/24 1509) Vital Signs (24h Range):  Temp:  [97.5 °F (36.4 °C)-98.8 °F (37.1 °C)] 98.3 °F (36.8 °C)  Pulse:  [66-93] 75  Resp:  [16-20] 18  SpO2:  [91 %-97 %] 93 %  BP: (116-148)/(67-74) 128/71     Weight: 64.4 kg (142 lb)  Body mass index  is 26.83 kg/m².    SpO2: (!) 93 %       Intake/Output Summary (Last 24 hours) at 1/5/2024 1656  Last data filed at 1/5/2024 1358  Gross per 24 hour   Intake 580 ml   Output 2050 ml   Net -1470 ml       Lines/Drains/Airways       Drain  Duration             Female External Urinary Catheter w/ Suction 12/29/23 7 days              Peripheral Intravenous Line  Duration                  Peripheral IV - Single Lumen 12/29/23 1544 20 G Left Antecubital 7 days                  Significant Labs:  Recent Results (from the past 72 hour(s))   RT Blood Gas    Collection Time: 01/02/24  8:26 PM   Result Value Ref Range    Sample Type Arterial Blood     Sample site Right Radial Artery     Drawn by damir ronquillo rrt     pH, Blood gas 7.650 (HH) 7.350 - 7.450    pCO2, Blood gas 27.0 (L) 35.0 - 45.0 mmHg    pO2, Blood gas 129.0 (H) 80.0 - 100.0 mmHg    Sodium, Blood Gas 122 (L) 137 - 145 mmol/L    Potassium, Blood Gas 3.9 3.5 - 5.0 mmol/L    Calcium Level Ionized 0.94 (L) 1.12 - 1.23 mmol/L    TOC2, Blood gas 30.5 mmol/L    Base Excess, Blood gas 9.40 (H) -2.00 - 2.00 mmol/L    sO2, Blood gas 99.4 %    HCO3, Blood gas 29.7 (H) 22.0 - 26.0 mmol/L    THb, Blood gas 12.8 12 - 16 g/dL    O2 Hb, Blood Gas 96.2 94.0 - 97.0 %    CO Hgb 0.4 (L) 0.5 - 1.5 %    Met Hgb 1.0 0.4 - 1.5 %    Oxygen Device, Blood gas Cannula     LPM 4    Basic Metabolic Panel    Collection Time: 01/03/24  4:22 AM   Result Value Ref Range    Sodium Level 135 (L) 136 - 145 mmol/L    Potassium Level 4.0 3.5 - 5.1 mmol/L    Chloride 94 (L) 98 - 107 mmol/L    Carbon Dioxide 31 23 - 31 mmol/L    Glucose Level 94 82 - 115 mg/dL    Blood Urea Nitrogen 15.4 9.8 - 20.1 mg/dL    Creatinine 0.80 0.55 - 1.02 mg/dL    BUN/Creatinine Ratio 19     Calcium Level Total 7.7 (L) 8.4 - 10.2 mg/dL    Anion Gap 10.0 mEq/L    eGFR >60 mls/min/1.73/m2   CBC with Differential    Collection Time: 01/03/24  4:22 AM   Result Value Ref Range    WBC 10.75 4.50 - 11.50 x10(3)/mcL    RBC 4.03 (L)  4.20 - 5.40 x10(6)/mcL    Hgb 12.8 12.0 - 16.0 g/dL    Hct 37.5 37.0 - 47.0 %    MCV 93.1 80.0 - 94.0 fL    MCH 31.8 (H) 27.0 - 31.0 pg    MCHC 34.1 33.0 - 36.0 g/dL    RDW 11.9 11.5 - 17.0 %    Platelet 328 130 - 400 x10(3)/mcL    MPV 9.1 7.4 - 10.4 fL    Neut % 71.9 %    Lymph % 16.6 %    Mono % 8.0 %    Eos % 2.3 %    Basophil % 0.3 %    Lymph # 1.78 0.6 - 4.6 x10(3)/mcL    Neut # 7.73 2.1 - 9.2 x10(3)/mcL    Mono # 0.86 0.1 - 1.3 x10(3)/mcL    Eos # 0.25 0 - 0.9 x10(3)/mcL    Baso # 0.03 <=0.2 x10(3)/mcL    IG# 0.10 (H) 0 - 0.04 x10(3)/mcL    IG% 0.9 %    NRBC% 0.0 %   Basic Metabolic Panel    Collection Time: 01/04/24  3:21 AM   Result Value Ref Range    Sodium Level 135 (L) 136 - 145 mmol/L    Potassium Level 4.0 3.5 - 5.1 mmol/L    Chloride 96 (L) 98 - 107 mmol/L    Carbon Dioxide 28 23 - 31 mmol/L    Glucose Level 90 82 - 115 mg/dL    Blood Urea Nitrogen 15.6 9.8 - 20.1 mg/dL    Creatinine 0.81 0.55 - 1.02 mg/dL    BUN/Creatinine Ratio 19     Calcium Level Total 8.0 (L) 8.4 - 10.2 mg/dL    Anion Gap 11.0 mEq/L    eGFR >60 mls/min/1.73/m2   Magnesium    Collection Time: 01/04/24  3:21 AM   Result Value Ref Range    Magnesium Level 1.70 1.60 - 2.60 mg/dL   CBC with Differential    Collection Time: 01/04/24  3:21 AM   Result Value Ref Range    WBC 9.11 4.50 - 11.50 x10(3)/mcL    RBC 3.91 (L) 4.20 - 5.40 x10(6)/mcL    Hgb 12.3 12.0 - 16.0 g/dL    Hct 37.2 37.0 - 47.0 %    MCV 95.1 (H) 80.0 - 94.0 fL    MCH 31.5 (H) 27.0 - 31.0 pg    MCHC 33.1 33.0 - 36.0 g/dL    RDW 12.1 11.5 - 17.0 %    Platelet 309 130 - 400 x10(3)/mcL    MPV 9.1 7.4 - 10.4 fL    Neut % 63.6 %    Lymph % 21.6 %    Mono % 10.4 %    Eos % 2.7 %    Basophil % 0.3 %    Lymph # 1.97 0.6 - 4.6 x10(3)/mcL    Neut # 5.78 2.1 - 9.2 x10(3)/mcL    Mono # 0.95 0.1 - 1.3 x10(3)/mcL    Eos # 0.25 0 - 0.9 x10(3)/mcL    Baso # 0.03 <=0.2 x10(3)/mcL    IG# 0.13 (H) 0 - 0.04 x10(3)/mcL    IG% 1.4 %    NRBC% 0.0 %   Comprehensive Metabolic Panel    Collection  Time: 01/05/24  9:30 AM   Result Value Ref Range    Sodium Level 135 (L) 136 - 145 mmol/L    Potassium Level 4.7 3.5 - 5.1 mmol/L    Chloride 97 (L) 98 - 107 mmol/L    Carbon Dioxide 28 23 - 31 mmol/L    Glucose Level 136 (H) 82 - 115 mg/dL    Blood Urea Nitrogen 18.7 9.8 - 20.1 mg/dL    Creatinine 0.86 0.55 - 1.02 mg/dL    Calcium Level Total 8.1 (L) 8.4 - 10.2 mg/dL    Protein Total 6.1 5.8 - 7.6 gm/dL    Albumin Level 3.0 (L) 3.4 - 4.8 g/dL    Globulin 3.1 2.4 - 3.5 gm/dL    Albumin/Globulin Ratio 1.0 (L) 1.1 - 2.0 ratio    Bilirubin Total 0.9 <=1.5 mg/dL    Alkaline Phosphatase 59 40 - 150 unit/L    Alanine Aminotransferase 44 0 - 55 unit/L    Aspartate Aminotransferase 56 (H) 5 - 34 unit/L    eGFR >60 mls/min/1.73/m2   CBC with Differential    Collection Time: 01/05/24  9:30 AM   Result Value Ref Range    WBC 10.21 4.50 - 11.50 x10(3)/mcL    RBC 4.03 (L) 4.20 - 5.40 x10(6)/mcL    Hgb 12.7 12.0 - 16.0 g/dL    Hct 38.0 37.0 - 47.0 %    MCV 94.3 (H) 80.0 - 94.0 fL    MCH 31.5 (H) 27.0 - 31.0 pg    MCHC 33.4 33.0 - 36.0 g/dL    RDW 12.3 11.5 - 17.0 %    Platelet 417 (H) 130 - 400 x10(3)/mcL    MPV 8.8 7.4 - 10.4 fL    Neut % 71.8 %    Lymph % 18.1 %    Mono % 5.7 %    Eos % 2.3 %    Basophil % 0.5 %    Lymph # 1.85 0.6 - 4.6 x10(3)/mcL    Neut # 7.34 2.1 - 9.2 x10(3)/mcL    Mono # 0.58 0.1 - 1.3 x10(3)/mcL    Eos # 0.23 0 - 0.9 x10(3)/mcL    Baso # 0.05 <=0.2 x10(3)/mcL    IG# 0.16 (H) 0 - 0.04 x10(3)/mcL    IG% 1.6 %    NRBC% 0.0 %     Significant Imaging:  Imaging Results              X-Ray Chest 1 View (Final result)  Result time 12/29/23 12:13:33      Final result by Javier Mcneil MD (12/29/23 12:13:33)                   Impression:      Minimal blunting of the right costophrenic angle as above.    Slightly more confluent opacities in the right cardiophrenic angle region early infiltrate can not be completely excluded.    Otherwise no active pulmonary disease and no change as compared with previous  exam      Electronically signed by: Javier Mcneil  Date:    12/29/2023  Time:    12:13               Narrative:    EXAMINATION:  XR CHEST 1 VIEW    CPT 12970    CLINICAL HISTORY:  shortness of breath;    COMPARISON:  July 6, 2020    FINDINGS:  Examination reveals mediastinal cardiac silhouette to be within normal limits left lung field is clear and free of gross infiltrates atelectasis or effusions.    Slightly more confluent opacities identified in the right cardiophrenic angle region early infiltrate can not be completely excluded.    There might be minimal blunting of the right costophrenic angle which may indicate the presence of a small right-sided pleural effusion                                    Telemetry: SR     Physical Exam  Constitutional:       General: She is not in acute distress.     Appearance: Normal appearance.   HENT:      Head: Normocephalic.      Mouth/Throat:      Mouth: Mucous membranes are moist.   Cardiovascular:      Rate and Rhythm: Normal rate and regular rhythm.      Pulses: Normal pulses.      Heart sounds: Murmur: 2/6 TU.   Pulmonary:      Effort: Pulmonary effort is normal. No respiratory distress.      Comments: NC O2  Abdominal:      Palpations: Abdomen is soft.   Musculoskeletal:         General: No swelling. Normal range of motion.      Right lower leg: No edema.      Left lower leg: No edema.   Skin:     General: Skin is warm and dry.      Comments: R Groin Soft/Flat, Non-Tender, No Sign of Bleed/Infection. +2 BLE Palpable Pedal Pulses     Neurological:      General: No focal deficit present.      Mental Status: She is alert.   Psychiatric:         Mood and Affect: Mood normal.         Behavior: Behavior normal.         Judgment: Judgment normal.       Home Medications:   No current facility-administered medications on file prior to encounter.     Current Outpatient Medications on File Prior to Encounter   Medication Sig Dispense Refill    acetaminophen-codeine 300-30mg  (TYLENOL #3) 300-30 mg Tab Take 1 tablet by mouth daily as needed (pain).      amLODIPine (NORVASC) 10 MG tablet Take 1 tablet (10 mg total) by mouth once daily. 90 tablet 3    cinacalcet (SENSIPAR) 30 MG Tab Take 1 tablet (30 mg total) by mouth once daily. 30 tablet 3    DULoxetine (CYMBALTA) 60 MG capsule Take 1 capsule (60 mg total) by mouth every morning. (Patient taking differently: Take 60 mg by mouth 2 (two) times daily.) 30 capsule 11    gabapentin (NEURONTIN) 300 MG capsule Take 1 capsule (300 mg total) by mouth 2 (two) times daily. 60 capsule 11    levothyroxine (SYNTHROID) 75 MCG tablet Take 1 tablet (75 mcg total) by mouth once daily. 90 tablet 3    losartan (COZAAR) 25 MG tablet Take 0.5 tablets (12.5 mg total) by mouth once daily. 45 tablet 3    metoprolol succinate (TOPROL-XL) 25 MG 24 hr tablet Take 1 tablet (25 mg total) by mouth 2 (two) times daily. 180 tablet 1    pravastatin (PRAVACHOL) 20 MG tablet Take 20 mg by mouth once daily.      traZODone (DESYREL) 100 MG tablet Take 1 tablet (100 mg total) by mouth every evening. 30 tablet 11     Current Inpatient Medications:    Current Facility-Administered Medications:     0.9%  NaCl infusion, , Intravenous, PRN, Brendon Portillo MD, Stopped at 12/30/23 1658    acetaminophen tablet 650 mg, 650 mg, Oral, Q8H PRN, Evert Murillo MD, 650 mg at 01/03/24 2045    acetaminophen tablet 650 mg, 650 mg, Oral, Q8H PRN, Evert Murillo MD    acetaminophen-codeine 300-30mg per tablet 1 tablet, 1 tablet, Oral, Daily PRN, Evert Murillo MD    albuterol-ipratropium 2.5 mg-0.5 mg/3 mL nebulizer solution 3 mL, 3 mL, Nebulization, Q6H, Warner Campos MD, 3 mL at 01/05/24 1252    amLODIPine tablet 10 mg, 10 mg, Oral, Daily, Evert Murillo MD, 10 mg at 01/05/24 0903    aspirin chewable tablet 81 mg, 81 mg, Oral, Daily, Bakari Murillo, BERRY, 81 mg at 01/05/24 0903    atorvastatin tablet 40 mg, 40 mg, Oral, Daily, Bakari Murillo, BERRY, 40 mg at  01/05/24 0903    cinacalcet tablet 30 mg, 30 mg, Oral, Daily, Evert Murillo MD, 30 mg at 01/05/24 0903    clopidogreL tablet 75 mg, 75 mg, Oral, Daily, Heaven Hayward NP, 75 mg at 01/05/24 0903    diclofenac sodium 1 % gel 2 g, 2 g, Topical (Top), TID PRN, Reyes, Thairy G, DO    DULoxetine DR capsule 60 mg, 60 mg, Oral, BID, Evert Murillo MD, 60 mg at 01/05/24 0903    enoxaparin injection 60 mg, 1 mg/kg, Subcutaneous, Q12H, Juan Luis Onofre., NP, 60 mg at 01/05/24 0902    gabapentin capsule 300 mg, 300 mg, Oral, BID, Evert Murillo MD, 300 mg at 01/05/24 0903    hydrALAZINE injection 10 mg, 10 mg, Intravenous, Q4H PRN, David Banegas Jr., MD    levothyroxine tablet 75 mcg, 75 mcg, Oral, Daily, Evert Murillo MD, 75 mcg at 01/05/24 0903    losartan split tablet 12.5 mg, 12.5 mg, Oral, Daily, Evert Murillo MD, 12.5 mg at 01/05/24 0903    melatonin tablet 6 mg, 6 mg, Oral, Nightly PRN, Evert Murillo MD    metoprolol succinate (TOPROL-XL) 24 hr tablet 25 mg, 25 mg, Oral, BID, Evert Murillo MD, 25 mg at 01/05/24 0903    morphine injection 2 mg, 2 mg, Intravenous, Q2H PRN, Chris Onofreall Q., NP    nitroGLYCERIN SL tablet 0.4 mg, 0.4 mg, Sublingual, Q5 Min PRN, Chris Onofreall Q., NP    ondansetron disintegrating tablet 8 mg, 8 mg, Oral, Q8H PRN, David Banegas Jr., MD    sodium chloride 0.9% flush 10 mL, 10 mL, Intravenous, PRN, Evert Murillo MD    sodium chloride 0.9% flush 10 mL, 10 mL, Intravenous, PRN, Heaven Hayward, NP    traZODone tablet 100 mg, 100 mg, Oral, QHS, Evert Murillo MD, 100 mg at 01/04/24 1957    zinc oxide-cod liver oil 40 % paste, , Topical (Top), BID, Josué Crooks MD, Given at 01/05/24 0904    VTE Risk Mitigation (From admission, onward)           Ordered     enoxaparin injection 60 mg  Every 12 hours (non-standard times)         12/31/23 1956     IP VTE HIGH RISK PATIENT  Once         12/30/23 0022     Place  sequential compression device  Until discontinued         12/30/23 0022                  Assessment:   NSTEMI - Type 1    - [facility] (1.4.24): There is severe coronary artery disease. Prox Left Anterior Descending had a 95% stenosis. The lesion was successfully treated with overlapping 3.0X24MM and 3.0X12MM SYNERGY XD drug-eluting stents. Post-dilatation was performed using an [employer] MR [id number] angioplasty balloon inflated to 18 rajeev. Following intervention there was 0% residual stenosis and ERON grade 3 flow in the distal vessel. Intravascular Ultrasound (IVUS) was performed prior to intervention to further characterize the lesion, as well as post-stent deployment to ensure optimal stent apposition and expansion. Mid Right Coronary Artery is chronically 100% occluded. The distal vessel is filled left to right collaterals.     - SPECT (1.2.24): moderate intensity, moderate sized,  in the basal to apical inferoapical wall(s) in the typical distribution of the RCA territory.     - Troponin Peak with Initial Troponin 2.126    - ECHO (12.29.23) - LVEF 55-60%, Normal Wall Motion     - Coronary Calcium Score 5.18.18 - Total 229    - MPI (2011) - Normal Perfusion Study, No Perfusion Defects Noted  CAP  Leukocytosis - Resolved   Cough 2/2 Above  CP 2/2 Above - Atypical   HTN  HLD  Anxiety  GERD  Fibromyalgia  OA  Hypothyroidism  No Hx of GIB    Plan:   Continue ASA, Plavix ,BB, and Statin  Treatment of Pneumonitis per Primary Team  Groin Precautions    - Showers only for 1 week    - Do not lift anything greater than 5 pounds for 1 week    - Do not drive for 1 week    - Monitor for signs of infection/bleeding  Keep K > 4.0 and Mg > 2.0   Follow-up with Dr. Guidry in 1-2 weeks after discharge     We will sign off at this time. Please call with any questions or concerns.     KRISTIN Elizabeth  Cardiology  [facility]  01/05/2024

## 2024-01-06 PROCEDURE — 25000242 PHARM REV CODE 250 ALT 637 W/ HCPCS: Performed by: INTERNAL MEDICINE

## 2024-01-06 PROCEDURE — 99900035 HC TECH TIME PER 15 MIN (STAT)

## 2024-01-06 PROCEDURE — 63600175 PHARM REV CODE 636 W HCPCS: Performed by: NURSE PRACTITIONER

## 2024-01-06 PROCEDURE — 25000003 PHARM REV CODE 250: Performed by: NURSE PRACTITIONER

## 2024-01-06 PROCEDURE — 63600175 PHARM REV CODE 636 W HCPCS: Performed by: INTERNAL MEDICINE

## 2024-01-06 PROCEDURE — 97530 THERAPEUTIC ACTIVITIES: CPT | Mod: CQ

## 2024-01-06 PROCEDURE — 97535 SELF CARE MNGMENT TRAINING: CPT

## 2024-01-06 PROCEDURE — 94761 N-INVAS EAR/PLS OXIMETRY MLT: CPT

## 2024-01-06 PROCEDURE — 25000003 PHARM REV CODE 250: Performed by: INTERNAL MEDICINE

## 2024-01-06 PROCEDURE — 97116 GAIT TRAINING THERAPY: CPT | Mod: CQ

## 2024-01-06 PROCEDURE — 97166 OT EVAL MOD COMPLEX 45 MIN: CPT

## 2024-01-06 PROCEDURE — 94640 AIRWAY INHALATION TREATMENT: CPT

## 2024-01-06 PROCEDURE — 21400001 HC TELEMETRY ROOM

## 2024-01-06 PROCEDURE — 27000221 HC OXYGEN, UP TO 24 HOURS

## 2024-01-06 RX ADMIN — ENOXAPARIN SODIUM 60 MG: 80 INJECTION SUBCUTANEOUS at 08:01

## 2024-01-06 RX ADMIN — CINACALCET 30 MG: 30 TABLET, FILM COATED ORAL at 09:01

## 2024-01-06 RX ADMIN — METOPROLOL SUCCINATE 25 MG: 25 TABLET, EXTENDED RELEASE ORAL at 09:01

## 2024-01-06 RX ADMIN — CLOPIDOGREL BISULFATE 75 MG: 75 TABLET ORAL at 09:01

## 2024-01-06 RX ADMIN — IPRATROPIUM BROMIDE AND ALBUTEROL SULFATE 3 ML: 2.5; .5 SOLUTION RESPIRATORY (INHALATION) at 01:01

## 2024-01-06 RX ADMIN — ENOXAPARIN SODIUM 60 MG: 80 INJECTION SUBCUTANEOUS at 09:01

## 2024-01-06 RX ADMIN — AMLODIPINE BESYLATE 10 MG: 5 TABLET ORAL at 09:01

## 2024-01-06 RX ADMIN — LOSARTAN POTASSIUM 12.5 MG: 25 TABLET, FILM COATED ORAL at 09:01

## 2024-01-06 RX ADMIN — IPRATROPIUM BROMIDE AND ALBUTEROL SULFATE 3 ML: 2.5; .5 SOLUTION RESPIRATORY (INHALATION) at 07:01

## 2024-01-06 RX ADMIN — TRAZODONE HYDROCHLORIDE 100 MG: 100 TABLET ORAL at 08:01

## 2024-01-06 RX ADMIN — Medication: at 08:01

## 2024-01-06 RX ADMIN — ATORVASTATIN CALCIUM 40 MG: 40 TABLET, FILM COATED ORAL at 09:01

## 2024-01-06 RX ADMIN — GABAPENTIN 300 MG: 300 CAPSULE ORAL at 08:01

## 2024-01-06 RX ADMIN — METOPROLOL SUCCINATE 25 MG: 25 TABLET, EXTENDED RELEASE ORAL at 08:01

## 2024-01-06 RX ADMIN — GABAPENTIN 300 MG: 300 CAPSULE ORAL at 09:01

## 2024-01-06 RX ADMIN — DULOXETINE HYDROCHLORIDE 60 MG: 30 CAPSULE, DELAYED RELEASE ORAL at 08:01

## 2024-01-06 RX ADMIN — LEVOTHYROXINE SODIUM 75 MCG: 25 TABLET ORAL at 09:01

## 2024-01-06 RX ADMIN — DULOXETINE HYDROCHLORIDE 60 MG: 30 CAPSULE, DELAYED RELEASE ORAL at 09:01

## 2024-01-06 RX ADMIN — IPRATROPIUM BROMIDE AND ALBUTEROL SULFATE 3 ML: 2.5; .5 SOLUTION RESPIRATORY (INHALATION) at 02:01

## 2024-01-06 RX ADMIN — ASPIRIN 81 MG CHEWABLE TABLET 81 MG: 81 TABLET CHEWABLE at 09:01

## 2024-01-06 NOTE — PROGRESS NOTES
[facility]  Hospital Medicine Progress Note        CHIEF COMPLAINT   Cough (C/o cough worsening since yesterday with pulse ox reading at 86% on room air per Home Health just PTA. Denies SOB or known fever. O2 89% on RA in triage. 95% on 3L NC.)     HISTORY OF PRESENT ILLNESS:   Patient is a very pleasant 89-year-old female with past medical history as below including chronic kidney disease, hypertension, hyperlipidemia, anxiety, fibromyalgia, hypothyroidism who presented to the ER with complaints of worsening cough over the last 48 hours and a low home O2 reading per home health with home sat being 86% on room air.     She arrived to the ER requiring 3 L nasal cannula to maintain adequate sats, but afebrile hemodynamically stable.  Laboratory work showed a mild leukocytosis 14 K, a BNP of 840, a troponin of 1.9.  EKG showed sinus rhythm with RBBB.  Chest x-ray showed right-sided infiltrates.  She was seen by Cardiology in the ER and started on a heparin drip, she was given empiric antibiotics admitted to the hospitalist service for community-acquired pneumonia and NSTEMI.        Patient currently admitted for acute hypoxic respiratory failure due to adenovirus with bacterial coinfection pneumonia complicated by NSTEMI 1 versus 2, on heparin drip per CIS recommendations , s/p [facility] on 1/4- with PCI to LAD , on DAPT         Today's information   Patient seen and examined at bedside,  family at bedside  - no new complaints        Exam  GENERAL: awake, alert, oriented and in no acute distress, non-toxic appearing   HEENT: normocephalic atraumatic   NECK: supple   LUNGS:  Bilateral wheezing/rhonchi, no wheezing or rales, no accessory muscle use   CVS: Regular rate and rhythm, normal peripheral perfusion  ABD: Soft, non-tender, non-distended, bowel sounds present  EXTREMITIES: no clubbing or cyanosis  SKIN: Warm, dry.   NEURO: alert and oriented, grossly without focal deficits   PSYCHIATRIC:  Cooperative        ASSESSMENT & PLAN:   Community-acquired pneumonia -completed antibiotics   Acute hypoxemic respiratory failure 2/2 above   NSTEMI, likely type 1  CAD - PCI to LAD 1/4  Leukocytosis , resolved   Adenovirus infection  Cough   Atypical chest pain   Hypertension, controlled   Low magnesium levels   Grade 2 diastolic dysfunction        HX: Anxiety, fibromyalgia, HTN, hypothyroidism, hyperlipidemia      Plan  - s/p [facility] on 1/4- with PCI to LAD , on DAPT , statin   -cis has signed off  - Appreciate palliative Care input patient was now a DNR  Patient continues to require 2L of oxygen  CTA chest was done with no pulmonary embolism reactive LN, recommend repeat CT in 3-6 m  Leukocytosis now resolved  Evaluated by pulmonology 1/3 recommend cardiac eval  Blood cultures negative  Continue oxygen supplementation to goal SpO2 greater than 94%   Provide with incentive spirometry   Received ceftriaxone and azithromycin for 5 days   Completed IV  Lasix 20 mg daily for 3 days     DVT prophylaxis: sc lovenox  Code status: full         Dispo- plans for SNF       VITAL SIGNS: 24 HRS MIN & MAX LAST   Temp  Min: 97.6 °F (36.4 °C)  Max: 98.3 °F (36.8 °C) 97.8 °F (36.6 °C)   BP  Min: 127/64  Max: 137/73 137/73   Pulse  Min: 68  Max: 79  78   Resp  Min: 16  Max: 18 18   SpO2  Min: 91 %  Max: 96 % 96 %     I have reviewed the following labs:  Recent Labs   Lab 01/03/24 0422 01/04/24 0321 01/05/24  0930   WBC 10.75 9.11 10.21   RBC 4.03* 3.91* 4.03*   HGB 12.8 12.3 12.7   HCT 37.5 37.2 38.0   MCV 93.1 95.1* 94.3*   MCH 31.8* 31.5* 31.5*   MCHC 34.1 33.1 33.4   RDW 11.9 12.1 12.3    309 417*   MPV 9.1 9.1 8.8     Recent Labs   Lab 01/01/24  0432 01/02/24 0325 01/02/24 2026 01/03/24 0422 01/04/24  0321 01/05/24  0930   * 134*  --  135* 135* 135*   K 3.6 3.9  --  4.0 4.0 4.7   CO2 26 24  --  31 28 28   BUN 17.7 17.1  --  15.4 15.6 18.7   CREATININE 0.72 0.78  --  0.80 0.81 0.86   CALCIUM 7.6* 8.4  --  7.7* 8.0*  8.1*   PH  --   --  7.650*  --   --   --    MG 1.80 1.80  --   --  1.70  --    ALBUMIN 2.4* 3.0*  --   --   --  3.0*   ALKPHOS 54 69  --   --   --  59   ALT 29 36  --   --   --  44   AST 48* 47*  --   --   --  56*   BILITOT 0.4 0.5  --   --   --  0.9     Microbiology Results (last 7 days)       Procedure Component Value Units Date/Time    Blood Culture [[id number]]  (Normal) Collected: 12/30/23 0012    Order Status: Completed Specimen: Blood, Venous Updated: 01/04/24 0101     CULTURE, BLOOD ([facility]) No Growth at 5 days    Blood Culture [[id number]]  (Normal) Collected: 12/30/23 0012    Order Status: Completed Specimen: Blood, Venous Updated: 01/04/24 0101     CULTURE, BLOOD ([facility]) No Growth at 5 days             See below for Radiology    Scheduled Med:   albuterol-ipratropium  3 mL Nebulization Q6H    amLODIPine  10 mg Oral Daily    aspirin  81 mg Oral Daily    atorvastatin  40 mg Oral Daily    cinacalcet  30 mg Oral Daily    clopidogreL  75 mg Oral Daily    DULoxetine  60 mg Oral BID    enoxaparin  1 mg/kg Subcutaneous Q12H    gabapentin  300 mg Oral BID    levothyroxine  75 mcg Oral Daily    losartan  12.5 mg Oral Daily    metoprolol succinate  25 mg Oral BID    traZODone  100 mg Oral QHS    zinc oxide-cod liver oil   Topical (Top) BID      Continuous Infusions:     PRN Meds:  sodium chloride 0.9%, acetaminophen, acetaminophen, acetaminophen-codeine 300-30mg, diclofenac sodium, hydrALAZINE, melatonin, morphine, nitroGLYCERIN, ondansetron, sodium chloride 0.9%, sodium chloride 0.9%     Assessment/Plan:      VTE prophylaxis:     Patient condition:  Stable/Fair/Guarded/ Serious/ Critical    Anticipated discharge and Disposition:         All diagnosis and differential diagnosis have been reviewed; assessment and plan has been documented; I have personally reviewed the labs and test results that are presently available; I have reviewed the patients medication list; I have reviewed the consulting providers response and  recommendations. I have reviewed or attempted to review medical records based upon their availability    All of the patient's questions have been  addressed and answered. Patient's is agreeable to the above stated plan. I will continue to monitor closely and make adjustments to medical management as needed.  _____________________________________________________________________    Nutrition Status:    Radiology:  I have personally reviewed the following imaging and agree with the radiologist.     Cardiac catheterization    There is severe coronary artery disease.    Prox Left Anterior Descending had a 95% stenosis. The lesion was   successfully treated with overlapping 3.0X24MM and 3.0X12MM SYNERGY XD   drug-eluting stents. Post-dilatation was performed using an NC EMERGE MR   3.07W40ZQ angioplasty balloon inflated to 18 rajeev. Following intervention   there was 0% residual stenosis and ERON grade 3 flow in the distal vessel.    Intravascular Ultrasound (IVUS) was performed prior to intervention to   further characterize the lesion, as well as post-stent deployment to   ensure optimal stent apposition and expansion.    Mid Right Coronary Artery is chronically 100% occluded. The distal   vessel is filled via left-to-right collaterals.    The procedure log was documented by No documenter listed and verified by   David Banegas Jr, MD.    Date: 1/4/2024  Time: 3:16 PM    Procedure:  Left heart catheterization  Percutaneous coronary intervention     Preoperative diagnosis:  NSTEMI    Postoperative diagnosis:  Successful PCI     Access:  Right common femoral artery    Estimated blood loss: 10 cc  Complications: None     Summary:  Consent obtained. Risks and benefits discussed with the patient. The   patient was brought to the cath lab in a fasting state. The patient was   prepped and draped in usual sterile fashion. A preprocedure time-out was   performed.     A 5 French JL4 catheter was used to cannulate the left main  coronary   artery; angiography was performed, and multiple fluoroscopic views were   obtained.  A 5 Greek JR4 catheter was used to cannulate the right coronary artery;   angiography was performed, and multiple fluoroscopic views were obtained.    The proximal LAD lesion was wired with a hyper coat run-through wire.    Balloon angioplasty was performed using a 2.75 by 15 mm Wuzzuf MR   compliant angioplasty balloon.  Subsequently, intravascular ultrasound was   performed to further characterize the lesion, and size the vessel for   stenting.  Using these measurements, a 3.0 x 24 mm synergy XD drug-eluting   stent was deployed across the lesion.  Intravascular ultrasound was again   performed, which did show some residual disease just distal to this stent.    Accordingly, a 3.0 x 12 mm synergy XD drug-eluting stent was deployed in   overlapping manner, just proximal to a large diagonal branch.    Subsequently, using the IVUS measurements obtained previously, balloon   angioplasty was performed using a 3.25 x 15 mm noncompliant angioplasty   balloon inflated to 18 atmospheres.  Intravascular ultrasound was then   performed one final time, which showed no evidence of edge dissection,   well-expanded and apposed stents.  A wire out shot was performed, which   showed excellent angiographic result and ERON grade 3 flow in the distal   vessel.    At the end the procedure, all wires and catheters were removed.   Hemostasis achieved with an Angioseal.     Findings:  - There is severe coronary artery disease.  - Prox Left Anterior Descending had a 95% stenosis. The lesion was   successfully treated with overlapping 3.0X24MM and 3.0X12MM SYNERGY XD   drug-eluting stents. Post-dilatation was performed using an NC AppSheet MR   3.53F20DO angioplasty balloon inflated to 18 rajeev. Following intervention   there was 0% residual stenosis and ERON grade 3 flow in the distal vessel.  - Intravascular Ultrasound (IVUS) was performed prior  to intervention to   further characterize the lesion, as well as post-stent deployment to   ensure optimal stent apposition and expansion.  - Mid Right Coronary Artery is chronically 100% occluded.  The distal   vessel is filled via left-to-right collaterals.     Assessment/Plan:  - Patient is a 89 y.o. female presented with dyspnea on exertion.    Troponin was checked, and found to be significantly elevated.  She is   being treated by pulmonology for pneumonitis.  LHC showed severe CAD which   was treated as described above.  - Continue DAPT (aspirin + clopidogrel) for a minimum of 12 months and   aspirin indefinitely thereafter  - Continue high intensity statin  - Medical mgmt of [facility]     David Banegas MD  Interventional Cardiology/Structural Heart Disease  [facility]      Josué Crooks MD   01/06/2024

## 2024-01-06 NOTE — PT/OT/SLP PROGRESS
Physical Therapy Treatment    Patient Name:  Edwin Boyer   MRN:  [id number]    Recommendations:     Discharge Recommendations:      Discharge Equipment Recommendations: walker, rolling, to be determined by next level of care     Subjective     Patient awake.     Objective:     General Precautions: Standard, fall   Orthopedic Precautions:N/A   Braces:    Respiratory Status: Nasal cannula, flow 2 L/min  Skin Integrity: Visible skin intact    Communicated with nurse prior to session.  Patient found supine upon PT entry to room. Pt performed bed mobility to EOB with mod assist; amb 60' x 2 with RW and min a.  Patient left up in chair with all lines intact and call button in reach.    PT interventions performed during the course of today's session in an effort to prevent and/or reduce acquired pressure injuries:   Therapeutic positioning completed       GOALS:   Multidisciplinary Problems       Physical Therapy Goals          Problem: Physical Therapy    Goal Priority Disciplines Outcome Goal Variances Interventions   Physical Therapy Goal     PT, PT/OT Ongoing, Progressing     Description: Goals to be met by: 2024     Patient will increase functional independence with mobility by performin. Supine to sit with Stand-by Assistance  2. Sit to stand transfer with Stand-by Assistance  3. Bed to chair transfer with Stand-by Assistance using Rolling Walker  4. Gait  x 150 feet with Stand-by Assistance using Rolling Walker.                          Assessment:     Edwin Boyer is a 89 y.o. female admitted with a medical diagnosis of NSTEMI (non-ST elevated myocardial infarction).     Patient Active Problem List   Diagnosis    Hypothyroidism    HTN (hypertension), benign    Hyperlipidemia    GERD with esophagitis    OA (osteoarthritis)    Fibromyalgia    Insomnia    Osteoporosis    CKD (chronic kidney disease) stage 3, GFR 30-59 ml/min    Anxiety    Hypercalcemia    Urinary incontinence    Primary  hyperparathyroidism    Carpal tunnel syndrome    Need for influenza vaccination    Peripheral polyneuropathy    NSTEMI (non-ST elevated myocardial infarction)        Rehab Prognosis: Good; patient would benefit from acute skilled PT services to address these deficits and reach maximum level of function.    Recent Surgery: Procedure(s) (LRB):  Left heart cath (N/A) 2 Days Post-Op    Plan:     During this hospitalization, patient to be seen 5 x/week to address the identified rehab impairments via gait training, therapeutic activities, therapeutic exercises, neuromuscular re-education and progress toward the following goals:    Plan of Care Expires:  02/02/24    Billable Minutes     Billable Minutes: Gait Training 12 and Therapeutic Activity 13    Treatment Type: Treatment  PT/PTA: PTA     Number of PTA visits since last PT visit: 3     01/06/2024

## 2024-01-06 NOTE — PT/OT/SLP EVAL
"Occupational Therapy  Evaluation    Name: Edwin Boyer  MRN: [id number]  Admitting Diagnosis: NSTEMI  Recent Surgery: Procedure(s) (LRB):  Left heart cath (N/A) 2 Days Post-Op    Recommendations:     Discharge therapy intensity: Moderate Intensity Therapy   Discharge Equipment Recommendations: RW        Assessment:     Edwin Boyer is a 89 y.o. female with a medical diagnosis of NSTEMI s/p PCI, acute hypoxic resp failure due to adenovirus with pneumonia complicated by NSTEMI, severe CAD.  Pt with decreased O2 saturation with ambulation to toilet, recovered with standing rest breaks and cues for deep breathing.  Pt with good tolerance and motivation.  Family reports they are awaiting possible acceptance to TCU.  OT in agreement with this plan. Family reports a fall off toilet prior to arrival, around [place].  Son reports they were staying at a rental property with low toilets and they believe she lost or balance or may have been having her NSTEMI.  Bruising from this fall present on flank. She presents with the following performance deficits affecting function: weakness, impaired endurance, impaired self care skills, impaired functional mobility, gait instability, impaired balance, decreased lower extremity function, impaired cardiopulmonary response to activity.     Rehab Prognosis: Good; patient would benefit from acute skilled OT services to address these deficits and reach maximum level of function.       Plan:     Patient to be seen 3 x/week to address the above listed problems via self-care/home management  Plan of Care Expires: 02/06/24  Plan of Care Reviewed with: patient, son    Subjective     Chief Complaint: no complaints  Patient/Family Comments/goals: "to get stronger"    Occupational Profile:  Living Environment: lives alone, no steps to enter, tub with bench, has  OT/PT/nursing  Previous level of function: independent, retired RN  Roles and Routines: mother, friend  Equipment Used at Home:  " (rollaotr, bath bench, BSC (uses at night))  Assistance upon Discharge: family    Pain/Comfort:  Pain Rating 1: 0/10    Patients cultural, spiritual, Yazdanism conflicts given the current situation: no    Objective:     OT communicated with CARYN Wiley prior to session.      Patient was found up in chair with  (2L NC, telemetry, pulseox, purwick) upon OT entry to room.    General Precautions: Standard, fall  Orthopedic Precautions: N/A  Braces: N/A    Vital Signs: O2 saturation decreased to 84% with ambulation to toilet, increased quickly with deep breaths to 90%        Functional Mobility/Transfers:  Patient completed Sit <> Stand Transfer with minimum assistance  with  rolling walker   Patient completed Toilet Transfer Step Transfer technique with minimum assistance with  rolling walker  Functional Mobility: Min assist for steering RW, 1 LOB that pt was able to correct.    Activities of Daily Living:  Lower Body Dressing: maximal assistance    Toileting: maximal assistance reports difficulty with wiping did not wish to attempt for OT to assess the deficit, will continue to address , wears briefs at home due to prolapsed bladder per report    [facility] 6 Click ADL:  AMPAC Total Score: 14    Functional Cognition:  Intact, wears hearing aides     Visual Perceptual Skills:  Intact    Upper Extremity Function:  Right Upper Extremity:   3-/5 hands    Left Upper Extremity:  3-/5 hands    Balance:   Intact    Therapeutic Positioning  Risk for acquired pressure injuries is increased due to relative decrease in mobility d/t hospitalization , impaired mobility, and incontinence.    OT interventions performed during the course of today's session:   Education was provided on benefits of and recommendations for therapeutic positioning    Skin assessment: all bony prominences were assessed    Findings: known breakdown noted near sacrum/buttock, bruising from fall around [place] L flank/back    OT recommendations for therapeutic  positioning throughout hospitalization:   Follow [facility] Pressure Injury Prevention Protocol  Pt on OSMANY and WC on case    Additional Treatment:  ADL Trainin Sponge bathing, toileting-max assist limited by hand strength per pt report, will continue to address toileting tasks/ADL tasks to increase overall strength/endurance    Patient Education:  Patient provided with verbal education education regarding OT role/goals/POC, fall prevention, safety awareness, and Discharge/DME recommendations.  Understanding was verbalized, however additional teaching warranted.     Patient left up in chair with all lines intact and call button in reach, son present    GOALS:   Multidisciplinary Problems       Occupational Therapy Goals          Problem: Occupational Therapy    Goal Priority Disciplines Outcome Interventions   Occupational Therapy Goal     OT, PT/OT Ongoing, Progressing    Description: Goals to be met by: 24     Patient will increase functional independence with ADLs by performing:    UE Dressing with Supervision.  LE Dressing with Supervision.  Grooming while standing at sink with Supervision.  Toileting from toilet with Supervision for hygiene and clothing management.   Toilet transfer to toilet with Supervision.                         History:     Past Medical History:   Diagnosis Date    Anxiety 2022    Carpal tunnel syndrome, bilateral     Chronic back pain     CKD (chronic kidney disease) stage 3, GFR 30-59 ml/min 2022    Fibromyalgia     GERD with esophagitis 2022    HTN (hypertension), benign 2022    Hyperlipidemia 2022    Hypothyroidism 2022    Insomnia 2022    OA (osteoarthritis) 2022    Osteoporosis     Primary hyperparathyroidism 2023    Urinary incontinence 2022         Past Surgical History:   Procedure Laterality Date    APPENDECTOMY       SECTION      COLONOSCOPY      HYSTERECTOMY      LEFT HEART CATHETERIZATION N/A 2024    Procedure: Left heart  cath;  Surgeon: David Banegas Jr., MD;  Location: [facility] CATH LAB;  Service: Cardiology;  Laterality: N/A;    TOTAL KNEE ARTHROPLASTY         Time Tracking:     OT Date of Treatment:    OT Start Time: 1032  OT Stop Time: 1116  OT Total Time (min): 44 min    Billable Minutes:Evaluation 1 MOD  Self Care/Home Management 1    1/6/2024

## 2024-01-06 NOTE — PLAN OF CARE
Problem: Occupational Therapy  Goal: Occupational Therapy Goal  Description: Goals to be met by: 2/6/24     Patient will increase functional independence with ADLs by performing:    UE Dressing with Supervision.  LE Dressing with Supervision.  Grooming while standing at sink with Supervision.  Toileting from toilet with Supervision for hygiene and clothing management.   Toilet transfer to toilet with Supervision.    Outcome: Ongoing, Progressing

## 2024-01-07 PROCEDURE — 25000003 PHARM REV CODE 250: Performed by: INTERNAL MEDICINE

## 2024-01-07 PROCEDURE — 63600175 PHARM REV CODE 636 W HCPCS: Performed by: INTERNAL MEDICINE

## 2024-01-07 PROCEDURE — 25000242 PHARM REV CODE 250 ALT 637 W/ HCPCS: Performed by: INTERNAL MEDICINE

## 2024-01-07 PROCEDURE — 94761 N-INVAS EAR/PLS OXIMETRY MLT: CPT

## 2024-01-07 PROCEDURE — 21400001 HC TELEMETRY ROOM

## 2024-01-07 PROCEDURE — 25000003 PHARM REV CODE 250: Performed by: NURSE PRACTITIONER

## 2024-01-07 PROCEDURE — 63600175 PHARM REV CODE 636 W HCPCS: Performed by: NURSE PRACTITIONER

## 2024-01-07 PROCEDURE — 94640 AIRWAY INHALATION TREATMENT: CPT

## 2024-01-07 PROCEDURE — 99900035 HC TECH TIME PER 15 MIN (STAT)

## 2024-01-07 PROCEDURE — 27000221 HC OXYGEN, UP TO 24 HOURS

## 2024-01-07 RX ORDER — OXYBUTYNIN CHLORIDE 5 MG/1
5 TABLET ORAL 2 TIMES DAILY
Status: DISCONTINUED | OUTPATIENT
Start: 2024-01-07 | End: 2024-01-08 | Stop reason: HOSPADM

## 2024-01-07 RX ORDER — POLYETHYLENE GLYCOL 3350 17 G/17G
17 POWDER, FOR SOLUTION ORAL DAILY
Status: DISCONTINUED | OUTPATIENT
Start: 2024-01-07 | End: 2024-01-08 | Stop reason: HOSPADM

## 2024-01-07 RX ADMIN — IPRATROPIUM BROMIDE AND ALBUTEROL SULFATE 3 ML: 2.5; .5 SOLUTION RESPIRATORY (INHALATION) at 08:01

## 2024-01-07 RX ADMIN — TRAZODONE HYDROCHLORIDE 100 MG: 100 TABLET ORAL at 08:01

## 2024-01-07 RX ADMIN — IPRATROPIUM BROMIDE AND ALBUTEROL SULFATE 3 ML: 2.5; .5 SOLUTION RESPIRATORY (INHALATION) at 12:01

## 2024-01-07 RX ADMIN — DULOXETINE HYDROCHLORIDE 60 MG: 30 CAPSULE, DELAYED RELEASE ORAL at 08:01

## 2024-01-07 RX ADMIN — CLOPIDOGREL BISULFATE 75 MG: 75 TABLET ORAL at 08:01

## 2024-01-07 RX ADMIN — METOPROLOL SUCCINATE 25 MG: 25 TABLET, EXTENDED RELEASE ORAL at 08:01

## 2024-01-07 RX ADMIN — LEVOTHYROXINE SODIUM 75 MCG: 25 TABLET ORAL at 08:01

## 2024-01-07 RX ADMIN — IPRATROPIUM BROMIDE AND ALBUTEROL SULFATE 3 ML: 2.5; .5 SOLUTION RESPIRATORY (INHALATION) at 07:01

## 2024-01-07 RX ADMIN — AMLODIPINE BESYLATE 10 MG: 5 TABLET ORAL at 08:01

## 2024-01-07 RX ADMIN — ENOXAPARIN SODIUM 60 MG: 80 INJECTION SUBCUTANEOUS at 08:01

## 2024-01-07 RX ADMIN — Medication: at 08:01

## 2024-01-07 RX ADMIN — CINACALCET 30 MG: 30 TABLET, FILM COATED ORAL at 08:01

## 2024-01-07 RX ADMIN — POLYETHYLENE GLYCOL 3350 17 G: 17 POWDER, FOR SOLUTION ORAL at 08:01

## 2024-01-07 RX ADMIN — ASPIRIN 81 MG CHEWABLE TABLET 81 MG: 81 TABLET CHEWABLE at 08:01

## 2024-01-07 RX ADMIN — LOSARTAN POTASSIUM 12.5 MG: 25 TABLET, FILM COATED ORAL at 08:01

## 2024-01-07 RX ADMIN — OXYBUTYNIN CHLORIDE 5 MG: 5 TABLET ORAL at 08:01

## 2024-01-07 RX ADMIN — Medication: at 09:01

## 2024-01-07 RX ADMIN — GABAPENTIN 300 MG: 300 CAPSULE ORAL at 08:01

## 2024-01-07 RX ADMIN — ATORVASTATIN CALCIUM 40 MG: 40 TABLET, FILM COATED ORAL at 08:01

## 2024-01-07 NOTE — PROGRESS NOTES
[facility]  Hospital Medicine Progress Note        CHIEF COMPLAINT   Cough (C/o cough worsening since yesterday with pulse ox reading at 86% on room air per Home Health just PTA. Denies SOB or known fever. O2 89% on RA in triage. 95% on 3L NC.)     HISTORY OF PRESENT ILLNESS:   Patient is a very pleasant 89-year-old female with past medical history as below including chronic kidney disease, hypertension, hyperlipidemia, anxiety, fibromyalgia, hypothyroidism who presented to the ER with complaints of worsening cough over the last 48 hours and a low home O2 reading per home health with home sat being 86% on room air.     She arrived to the ER requiring 3 L nasal cannula to maintain adequate sats, but afebrile hemodynamically stable.  Laboratory work showed a mild leukocytosis 14 K, a BNP of 840, a troponin of 1.9.  EKG showed sinus rhythm with RBBB.  Chest x-ray showed right-sided infiltrates.  She was seen by Cardiology in the ER and started on a heparin drip, she was given empiric antibiotics admitted to the hospitalist service for community-acquired pneumonia and NSTEMI.        Patient currently admitted for acute hypoxic respiratory failure due to adenovirus with bacterial coinfection pneumonia complicated by NSTEMI 1 versus 2, on heparin drip per [facility]IS recommendations , s/p C on 1/4- with PCI to LAD , on DAPT .  Cardiology has signed off.  Patient was currently awaiting skilled nursing facility placement        Today's information   Patient seen and examined at bedside,  family at bedside  - no new complaints         Exam  GENERAL: awake, alert, oriented and in no acute distress, non-toxic appearing   HEENT: normocephalic atraumatic   NECK: supple   LUNGS:  Bilateral wheezing/rhonchi, no wheezing or rales, no accessory muscle use   CVS: Regular rate and rhythm, normal peripheral perfusion  ABD: Soft, non-tender, non-distended, bowel sounds present  EXTREMITIES: no clubbing or  cyanosis  SKIN: Warm, dry.   NEURO: alert and oriented, grossly without focal deficits   PSYCHIATRIC: Cooperative        ASSESSMENT & PLAN:   Community-acquired pneumonia -completed antibiotics   Acute hypoxemic respiratory failure 2/2 above   NSTEMI, likely type 1  CAD - PCI to LAD 1/4  Leukocytosis , resolved   Adenovirus infection  Cough   Atypical chest pain   Hypertension, controlled   Low magnesium levels   Grade 2 diastolic dysfunction        HX: Anxiety, fibromyalgia, HTN, hypothyroidism, hyperlipidemia      Plan  - s/p [facility] on 1/4- with PCI to LAD , on DAPT , statin   -cis has signed off  - Appreciate palliative Care input patient was now a DNR  Patient continues to require 2L of oxygen  CTA chest was done with no pulmonary embolism reactive LN, recommend repeat CT in 3-6 m  Continue oxygen supplementation to goal SpO2 greater than 94%   Provide with incentive spirometry   Received ceftriaxone and azithromycin for 5 days   Completed IV  Lasix 20 mg daily for 3 days     DVT prophylaxis: sc lovenox  Code status: full         Dispo-medically cleared, plans for SNF          VITAL SIGNS: 24 HRS MIN & MAX LAST   Temp  Min: 97.3 °F (36.3 °C)  Max: 98.4 °F (36.9 °C) 97.6 °F (36.4 °C)   BP  Min: 110/58  Max: 145/70 (!) 127/57   Pulse  Min: 65  Max: 78  69   Resp  Min: 17  Max: 20 18   SpO2  Min: 92 %  Max: 95 % (!) 94 %     I have reviewed the following labs:  Recent Labs   Lab 01/03/24 0422 01/04/24 0321 01/05/24  0930   WBC 10.75 9.11 10.21   RBC 4.03* 3.91* 4.03*   HGB 12.8 12.3 12.7   HCT 37.5 37.2 38.0   MCV 93.1 95.1* 94.3*   MCH 31.8* 31.5* 31.5*   MCHC 34.1 33.1 33.4   RDW 11.9 12.1 12.3    309 417*   MPV 9.1 9.1 8.8     Recent Labs   Lab 01/01/24  0432 01/02/24 0325 01/02/24 2026 01/03/24 0422 01/04/24  0321 01/05/24  0930   * 134*  --  135* 135* 135*   K 3.6 3.9  --  4.0 4.0 4.7   CO2 26 24  --  31 28 28   BUN 17.7 17.1  --  15.4 15.6 18.7   CREATININE 0.72 0.78  --  0.80 0.81 0.86    CALCIUM 7.6* 8.4  --  7.7* 8.0* 8.1*   PH  --   --  7.650*  --   --   --    MG 1.80 1.80  --   --  1.70  --    ALBUMIN 2.4* 3.0*  --   --   --  3.0*   ALKPHOS 54 69  --   --   --  59   ALT 29 36  --   --   --  44   AST 48* 47*  --   --   --  56*   BILITOT 0.4 0.5  --   --   --  0.9     Microbiology Results (last 7 days)       Procedure Component Value Units Date/Time    Blood Culture [[id number]]  (Normal) Collected: 12/30/23 0012    Order Status: Completed Specimen: Blood, Venous Updated: 01/04/24 0101     CULTURE, BLOOD ([facility]) No Growth at 5 days    Blood Culture [[id number]]  (Normal) Collected: 12/30/23 0012    Order Status: Completed Specimen: Blood, Venous Updated: 01/04/24 0101     CULTURE, BLOOD ([facility]) No Growth at 5 days             See below for Radiology    Scheduled Med:   albuterol-ipratropium  3 mL Nebulization Q6H    amLODIPine  10 mg Oral Daily    aspirin  81 mg Oral Daily    atorvastatin  40 mg Oral Daily    cinacalcet  30 mg Oral Daily    clopidogreL  75 mg Oral Daily    DULoxetine  60 mg Oral BID    enoxaparin  1 mg/kg Subcutaneous Q12H    gabapentin  300 mg Oral BID    levothyroxine  75 mcg Oral Daily    losartan  12.5 mg Oral Daily    metoprolol succinate  25 mg Oral BID    traZODone  100 mg Oral QHS    zinc oxide-cod liver oil   Topical (Top) BID      Continuous Infusions:     PRN Meds:  sodium chloride 0.9%, acetaminophen, acetaminophen, acetaminophen-codeine 300-30mg, diclofenac sodium, hydrALAZINE, melatonin, morphine, nitroGLYCERIN, ondansetron, sodium chloride 0.9%, sodium chloride 0.9%     Assessment/Plan:      VTE prophylaxis:     Patient condition:  Stable/Fair/Guarded/ Serious/ Critical    Anticipated discharge and Disposition:         All diagnosis and differential diagnosis have been reviewed; assessment and plan has been documented; I have personally reviewed the labs and test results that are presently available; I have reviewed the patients medication list; I have reviewed the  consulting providers response and recommendations. I have reviewed or attempted to review medical records based upon their availability    All of the patient's questions have been  addressed and answered. Patient's is agreeable to the above stated plan. I will continue to monitor closely and make adjustments to medical management as needed.  _____________________________________________________________________    Nutrition Status:    Radiology:  I have personally reviewed the following imaging and agree with the radiologist.     Cardiac catheterization    There is severe coronary artery disease.    Prox Left Anterior Descending had a 95% stenosis. The lesion was   successfully treated with overlapping 3.0X24MM and 3.0X12MM SYNERGY XD   drug-eluting stents. Post-dilatation was performed using an NC EMERGE MR   3.89L08HP angioplasty balloon inflated to 18 rajeev. Following intervention   there was 0% residual stenosis and ERON grade 3 flow in the distal vessel.    Intravascular Ultrasound (IVUS) was performed prior to intervention to   further characterize the lesion, as well as post-stent deployment to   ensure optimal stent apposition and expansion.    Mid Right Coronary Artery is chronically 100% occluded. The distal   vessel is filled via left-to-right collaterals.    The procedure log was documented by No documenter listed and verified by   David Banegas Jr, MD.    Date: 1/4/2024  Time: 3:16 PM    Procedure:  Left heart catheterization  Percutaneous coronary intervention     Preoperative diagnosis:  NSTEMI    Postoperative diagnosis:  Successful PCI     Access:  Right common femoral artery    Estimated blood loss: 10 cc  Complications: None     Summary:  Consent obtained. Risks and benefits discussed with the patient. The   patient was brought to the cath lab in a fasting state. The patient was   prepped and draped in usual sterile fashion. A preprocedure time-out was   performed.     A 5 French JL4 catheter was  used to cannulate the left main coronary   artery; angiography was performed, and multiple fluoroscopic views were   obtained.  A 5 Romansh JR4 catheter was used to cannulate the right coronary artery;   angiography was performed, and multiple fluoroscopic views were obtained.    The proximal LAD lesion was wired with a hyper coat run-through wire.    Balloon angioplasty was performed using a 2.75 by 15 mm [employer] MR   compliant angioplasty balloon.  Subsequently, intravascular ultrasound was   performed to further characterize the lesion, and size the vessel for   stenting.  Using these measurements, a 3.0 x 24 mm synergy XD drug-eluting   stent was deployed across the lesion.  Intravascular ultrasound was again   performed, which did show some residual disease just distal to this stent.    Accordingly, a 3.0 x 12 mm synergy XD drug-eluting stent was deployed in   overlapping manner, just proximal to a large diagonal branch.    Subsequently, using the IVUS measurements obtained previously, balloon   angioplasty was performed using a 3.25 x 15 mm noncompliant angioplasty   balloon inflated to 18 atmospheres.  Intravascular ultrasound was then   performed one final time, which showed no evidence of edge dissection,   well-expanded and apposed stents.  A wire out shot was performed, which   showed excellent angiographic result and ERON grade 3 flow in the distal   vessel.    At the end the procedure, all wires and catheters were removed.   Hemostasis achieved with an Angioseal.     Findings:  - There is severe coronary artery disease.  - Prox Left Anterior Descending had a 95% stenosis. The lesion was   successfully treated with overlapping 3.0X24MM and 3.0X12MM SYNERGY XD   drug-eluting stents. Post-dilatation was performed using an NC [employer] MR   3.81D74UG angioplasty balloon inflated to 18 rajeev. Following intervention   there was 0% residual stenosis and ERON grade 3 flow in the distal vessel.  - Intravascular  Ultrasound (IVUS) was performed prior to intervention to   further characterize the lesion, as well as post-stent deployment to   ensure optimal stent apposition and expansion.  - Mid Right Coronary Artery is chronically 100% occluded.  The distal   vessel is filled via left-to-right collaterals.     Assessment/Plan:  - Patient is a 89 y.o. female presented with dyspnea on exertion.    Troponin was checked, and found to be significantly elevated.  She is   being treated by pulmonology for pneumonitis.  LHC showed severe CAD which   was treated as described above.  - Continue DAPT (aspirin + clopidogrel) for a minimum of 12 months and   aspirin indefinitely thereafter  - Continue high intensity statin  - Medical mgmt of [facility]     David Banegas MD  Interventional Cardiology/Structural Heart Disease  [facility]      Josué Crooks MD   01/07/2024

## 2024-01-08 VITALS
WEIGHT: 142 LBS | DIASTOLIC BLOOD PRESSURE: 72 MMHG | RESPIRATION RATE: 18 BRPM | OXYGEN SATURATION: 96 % | HEART RATE: 70 BPM | SYSTOLIC BLOOD PRESSURE: 135 MMHG | HEIGHT: 61 IN | BODY MASS INDEX: 26.81 KG/M2 | TEMPERATURE: 98 F

## 2024-01-08 PROBLEM — J18.9 CAP (COMMUNITY ACQUIRED PNEUMONIA): Status: ACTIVE | Noted: 2024-01-08

## 2024-01-08 PROCEDURE — 25000003 PHARM REV CODE 250: Performed by: NURSE PRACTITIONER

## 2024-01-08 PROCEDURE — 63600175 PHARM REV CODE 636 W HCPCS: Performed by: NURSE PRACTITIONER

## 2024-01-08 PROCEDURE — 94761 N-INVAS EAR/PLS OXIMETRY MLT: CPT

## 2024-01-08 PROCEDURE — 25000242 PHARM REV CODE 250 ALT 637 W/ HCPCS: Performed by: INTERNAL MEDICINE

## 2024-01-08 PROCEDURE — 97116 GAIT TRAINING THERAPY: CPT

## 2024-01-08 PROCEDURE — 27000221 HC OXYGEN, UP TO 24 HOURS

## 2024-01-08 PROCEDURE — 99900035 HC TECH TIME PER 15 MIN (STAT)

## 2024-01-08 PROCEDURE — 94640 AIRWAY INHALATION TREATMENT: CPT

## 2024-01-08 PROCEDURE — 25000003 PHARM REV CODE 250: Performed by: INTERNAL MEDICINE

## 2024-01-08 PROCEDURE — 63600175 PHARM REV CODE 636 W HCPCS: Performed by: INTERNAL MEDICINE

## 2024-01-08 RX ORDER — ATORVASTATIN CALCIUM 40 MG/1
40 TABLET, FILM COATED ORAL DAILY
Qty: 90 TABLET | Refills: 3 | Status: SHIPPED | OUTPATIENT
Start: 2024-01-09 | End: 2025-01-08

## 2024-01-08 RX ORDER — OXYBUTYNIN CHLORIDE 5 MG/1
5 TABLET ORAL 2 TIMES DAILY
Qty: 60 TABLET | Refills: 11 | Status: SHIPPED | OUTPATIENT
Start: 2024-01-08 | End: 2025-01-07

## 2024-01-08 RX ORDER — CLOPIDOGREL BISULFATE 75 MG/1
75 TABLET ORAL DAILY
Qty: 30 TABLET | Refills: 11 | Status: SHIPPED | OUTPATIENT
Start: 2024-01-09 | End: 2025-01-08

## 2024-01-08 RX ORDER — NAPROXEN SODIUM 220 MG/1
81 TABLET, FILM COATED ORAL DAILY
Qty: 30 TABLET | Refills: 11 | Status: SHIPPED | OUTPATIENT
Start: 2024-01-09 | End: 2025-01-08

## 2024-01-08 RX ORDER — DULOXETIN HYDROCHLORIDE 60 MG/1
60 CAPSULE, DELAYED RELEASE ORAL 2 TIMES DAILY
Qty: 60 CAPSULE | Refills: 11 | Status: SHIPPED | OUTPATIENT
Start: 2024-01-08 | End: 2025-01-07

## 2024-01-08 RX ADMIN — LEVOTHYROXINE SODIUM 75 MCG: 25 TABLET ORAL at 09:01

## 2024-01-08 RX ADMIN — LOSARTAN POTASSIUM 12.5 MG: 25 TABLET, FILM COATED ORAL at 09:01

## 2024-01-08 RX ADMIN — IPRATROPIUM BROMIDE AND ALBUTEROL SULFATE 3 ML: 2.5; .5 SOLUTION RESPIRATORY (INHALATION) at 08:01

## 2024-01-08 RX ADMIN — METOPROLOL SUCCINATE 25 MG: 25 TABLET, EXTENDED RELEASE ORAL at 09:01

## 2024-01-08 RX ADMIN — GABAPENTIN 300 MG: 300 CAPSULE ORAL at 09:01

## 2024-01-08 RX ADMIN — ENOXAPARIN SODIUM 80 MG: 80 INJECTION SUBCUTANEOUS at 09:01

## 2024-01-08 RX ADMIN — POLYETHYLENE GLYCOL 3350 17 G: 17 POWDER, FOR SOLUTION ORAL at 09:01

## 2024-01-08 RX ADMIN — CINACALCET 30 MG: 30 TABLET, FILM COATED ORAL at 09:01

## 2024-01-08 RX ADMIN — IPRATROPIUM BROMIDE AND ALBUTEROL SULFATE 3 ML: 2.5; .5 SOLUTION RESPIRATORY (INHALATION) at 01:01

## 2024-01-08 RX ADMIN — ASPIRIN 81 MG CHEWABLE TABLET 81 MG: 81 TABLET CHEWABLE at 09:01

## 2024-01-08 RX ADMIN — OXYBUTYNIN CHLORIDE 5 MG: 5 TABLET ORAL at 09:01

## 2024-01-08 RX ADMIN — ACETAMINOPHEN 650 MG: 325 TABLET, FILM COATED ORAL at 11:01

## 2024-01-08 RX ADMIN — DULOXETINE HYDROCHLORIDE 60 MG: 30 CAPSULE, DELAYED RELEASE ORAL at 09:01

## 2024-01-08 RX ADMIN — IPRATROPIUM BROMIDE AND ALBUTEROL SULFATE 3 ML: 2.5; .5 SOLUTION RESPIRATORY (INHALATION) at 12:01

## 2024-01-08 RX ADMIN — ATORVASTATIN CALCIUM 40 MG: 40 TABLET, FILM COATED ORAL at 09:01

## 2024-01-08 RX ADMIN — AMLODIPINE BESYLATE 10 MG: 5 TABLET ORAL at 09:01

## 2024-01-08 RX ADMIN — CLOPIDOGREL BISULFATE 75 MG: 75 TABLET ORAL at 09:01

## 2024-01-08 NOTE — PROGRESS NOTES
"Advance Care Planning     Date: 01/08/2024    During this visit, I engaged the patient in the voluntary advance care planning process.  The patient and I reviewed the role for advance directives and their purpose in directing future healthcare if the patient's unable to speak for herself.  At this point in time, the patient does have full decision-making capacity.  We discussed different extreme health states that she could experience, and reviewed what kind of medical care she would want in those situations.     In light of the patients advanced and terminal illness, we reviewed what the patients preferences for care would be at the very end of life. The patient communicated that if she were comatose and had little chance of a meaningful recovery, she would not want machines/life-sustaining treatments used, and confirmed DNR code status.  The patient wishes to have a natural, peaceful death.  Along those lines, the patient does not wish to have CPR or other invasive treatments performed when her heart and/or breathing stops.  I communicated to the patient that a DNR order has been placed in her medical record to reflect this preference.  In addition, a [place] form was completed to reflect other EOL preferences of the patient such as selective treatments and trail period of artificial nutritional administration with the goal of comfort and quality of life.     I initiated the process of voluntary advance care planning today and explained the importance of this process to the patient.  I introduced the concept of advance directives and the [place]POST document to the patient, as well. I provided the patient with detailed information about the importance of designating a Health Care Power of  (HCPOA), which included [facility]'s educational booklet:  "How to Start the Conversation about Advance Care Planning". She was also instructed to communicate with her Health Care Power of  (HCPOA) about their wishes " for future healthcare, should she become sick and lose decision-making capacity.       I encouraged the patient to review the documents and informed her that the Palliative Care Team would follow up the next day to address any questions or concerns. Advised to reach out to the Palliative Care Team with any needed assistance.

## 2024-01-08 NOTE — DISCHARGE SUMMARY
[facility] Hospital Medicine Discharge Summary    Admit Date: 12/29/2023  Discharge Date and Time: 1/8/20241:58 PM  Admitting Physician:  Team  Discharging Physician: Josué Crooks MD.  Primary Care Physician: Mariely Soto MD  Consults: Cardiology, Hospital Medicine, and Pulmonary/Intensive care    Discharge Diagnoses:  Community-acquired pneumonia -completed antibiotics   Acute hypoxemic respiratory failure 2/2 above   NSTEMI, likely type 1- - PCI to LAD 1/4 on DAPT   CAD - PCI to LAD 1/4  Leukocytosis , resolved   Adenovirus infection  Cough   Atypical chest pain   Hypertension, controlled   Low magnesium levels   Grade 2 diastolic dysfunction        HX: Anxiety, fibromyalgia, HTN, hypothyroidism, hyperlipidemia     Hospital Course:   CHIEF COMPLAINT   Cough (C/o cough worsening since yesterday with pulse ox reading at 86% on room air per Home Health just PTA. Denies SOB or known fever. O2 89% on RA in triage. 95% on 3L NC.)     HISTORY OF PRESENT ILLNESS:   Patient is a very pleasant 89-year-old female with past medical history as below including chronic kidney disease, hypertension, hyperlipidemia, anxiety, fibromyalgia, hypothyroidism who presented to the ER with complaints of worsening cough over the last 48 hours and a low home O2 reading per home health with home sat being 86% on room air.     She arrived to the ER requiring 3 L nasal cannula to maintain adequate sats, but afebrile hemodynamically stable.  Laboratory work showed a mild leukocytosis 14 K, a BNP of 840, a troponin of 1.9.  EKG showed sinus rhythm with RBBB.  Chest x-ray showed right-sided infiltrates.  She was seen by Cardiology in the ER and started on a heparin drip, she was given empiric antibiotics admitted to the hospitalist service for community-acquired pneumonia and NSTEMI.        Patient currently admitted for acute hypoxic respiratory failure due to adenovirus with bacterial coinfection  pneumonia complicated by NSTEMI 1 versus 2, on heparin drip per [facility] recommendations , s/p LHC on 1/4- with PCI to LAD , on DAPT .  Cardiology has signed off.  Patient was currently awaiting skilled nursing facility placement      plan  - continue o2 supplementation, wean off as can tolerate to goal spo2 > 94%  - continue DAPT, for atleast 6 months , until [facility] discontinues it   - continue Beta blocker, statin      Exam  GENERAL: awake, alert, oriented and in no acute distress, non-toxic appearing   HEENT: normocephalic atraumatic   NECK: supple   LUNGS:  Bilateral wheezing/rhonchi, no wheezing or rales, no accessory muscle use   CVS: Regular rate and rhythm, normal peripheral perfusion  ABD: Soft, non-tender, non-distended, bowel sounds present  EXTREMITIES: no clubbing or cyanosis  SKIN: Warm, dry.   NEURO: alert and oriented, grossly without focal deficits   PSYCHIATRIC: Cooperative     Pt was seen and examined on the day of discharge  Vitals:  VITAL SIGNS: 24 HRS MIN & MAX LAST   Temp  Min: 97.6 °F (36.4 °C)  Max: 98.4 °F (36.9 °C) 97.8 °F (36.6 °C)   BP  Min: 112/59  Max: 148/74 135/72   Pulse  Min: 67  Max: 98  70   Resp  Min: 15  Max: 22 18   SpO2  Min: 92 %  Max: 96 % 96 %           Procedures Performed: No admission procedures for hospital encounter.     Significant Diagnostic Studies: See Full reports for all details    Recent Labs   Lab 01/03/24 0422 01/04/24 0321 01/05/24  0930   WBC 10.75 9.11 10.21   RBC 4.03* 3.91* 4.03*   HGB 12.8 12.3 12.7   HCT 37.5 37.2 38.0   MCV 93.1 95.1* 94.3*   MCH 31.8* 31.5* 31.5*   MCHC 34.1 33.1 33.4   RDW 11.9 12.1 12.3    309 417*   MPV 9.1 9.1 8.8       Recent Labs   Lab 01/02/24 0325 01/02/24 2026 01/03/24  0422 01/04/24  0321 01/05/24  0930   *  --  135* 135* 135*   K 3.9  --  4.0 4.0 4.7   CO2 24  --  31 28 28   BUN 17.1  --  15.4 15.6 18.7   CREATININE 0.78  --  0.80 0.81 0.86   CALCIUM 8.4  --  7.7* 8.0* 8.1*   PH  --  7.650*  --   --   --    MG  1.80  --   --  1.70  --    ALBUMIN 3.0*  --   --   --  3.0*   ALKPHOS 69  --   --   --  59   ALT 36  --   --   --  44   AST 47*  --   --   --  56*   BILITOT 0.5  --   --   --  0.9        Microbiology Results (last 7 days)       Procedure Component Value Units Date/Time    Blood Culture [[id number]]  (Normal) Collected: 12/30/23 0012    Order Status: Completed Specimen: Blood, Venous Updated: 01/04/24 0101     CULTURE, BLOOD ([facility]) No Growth at 5 days    Blood Culture [[id number]]  (Normal) Collected: 12/30/23 0012    Order Status: Completed Specimen: Blood, Venous Updated: 01/04/24 0101     CULTURE, BLOOD ([facility]) No Growth at 5 days             Cardiac catheterization    There is severe coronary artery disease.    Prox Left Anterior Descending had a 95% stenosis. The lesion was   successfully treated with overlapping 3.0X24MM and 3.0X12MM SYNERGY XD   drug-eluting stents. Post-dilatation was performed using an NC EMERGE MR   [id number] angioplasty balloon inflated to 18 rajeev. Following intervention   there was 0% residual stenosis and ERON grade 3 flow in the distal vessel.    Intravascular Ultrasound (IVUS) was performed prior to intervention to   further characterize the lesion, as well as post-stent deployment to   ensure optimal stent apposition and expansion.    Mid Right Coronary Artery is chronically 100% occluded. The distal   vessel is filled via left-to-right collaterals.    The procedure log was documented by No documenter listed and verified by   David Banegas Jr, MD.    Date: 1/4/2024  Time: 3:16 PM    Procedure:  Left heart catheterization  Percutaneous coronary intervention     Preoperative diagnosis:  NSTEMI    Postoperative diagnosis:  Successful PCI     Access:  Right common femoral artery    Estimated blood loss: 10 cc  Complications: None     Summary:  Consent obtained. Risks and benefits discussed with the patient. The   patient was brought to the cath lab in a fasting state. The patient was    prepped and draped in usual sterile fashion. A preprocedure time-out was   performed.     A 5 Finnish JL4 catheter was used to cannulate the left main coronary   artery; angiography was performed, and multiple fluoroscopic views were   obtained.  A 5 Finnish JR4 catheter was used to cannulate the right coronary artery;   angiography was performed, and multiple fluoroscopic views were obtained.    The proximal LAD lesion was wired with a hyper coat run-through wire.    Balloon angioplasty was performed using a 2.75 by 15 mm WhoJam MR   compliant angioplasty balloon.  Subsequently, intravascular ultrasound was   performed to further characterize the lesion, and size the vessel for   stenting.  Using these measurements, a 3.0 x 24 mm synergy XD drug-eluting   stent was deployed across the lesion.  Intravascular ultrasound was again   performed, which did show some residual disease just distal to this stent.    Accordingly, a 3.0 x 12 mm synergy XD drug-eluting stent was deployed in   overlapping manner, just proximal to a large diagonal branch.    Subsequently, using the IVUS measurements obtained previously, balloon   angioplasty was performed using a 3.25 x 15 mm noncompliant angioplasty   balloon inflated to 18 atmospheres.  Intravascular ultrasound was then   performed one final time, which showed no evidence of edge dissection,   well-expanded and apposed stents.  A wire out shot was performed, which   showed excellent angiographic result and ERON grade 3 flow in the distal   vessel.    At the end the procedure, all wires and catheters were removed.   Hemostasis achieved with an Angioseal.     Findings:  - There is severe coronary artery disease.  - Prox Left Anterior Descending had a 95% stenosis. The lesion was   successfully treated with overlapping 3.0X24MM and 3.0X12MM SYNERGY XD   drug-eluting stents. Post-dilatation was performed using an NC [employer] MR   [id number] angioplasty balloon inflated to 18 rajeev.  Following intervention   there was 0% residual stenosis and ERON grade 3 flow in the distal vessel.  - Intravascular Ultrasound (IVUS) was performed prior to intervention to   further characterize the lesion, as well as post-stent deployment to   ensure optimal stent apposition and expansion.  - Mid Right Coronary Artery is chronically 100% occluded.  The distal   vessel is filled via left-to-right collaterals.     Assessment/Plan:  - Patient is a 89 y.o. female presented with dyspnea on exertion.    Troponin was checked, and found to be significantly elevated.  She is   being treated by pulmonology for pneumonitis.  LHC showed severe CAD which   was treated as described above.  - Continue DAPT (aspirin + clopidogrel) for a minimum of 12 months and   aspirin indefinitely thereafter  - Continue high intensity statin  - Medical mgmt of [facility]     David Banegas MD  Interventional Cardiology/Structural Heart Disease  [facility]         Medication List        START taking these medications      aspirin 81 MG Chew  Take 1 tablet (81 mg total) by mouth once daily.  Start taking on: January 9, 2024     atorvastatin 40 MG tablet  Commonly known as: LIPITOR  Take 1 tablet (40 mg total) by mouth once daily.  Start taking on: January 9, 2024     clopidogreL 75 mg tablet  Commonly known as: PLAVIX  Take 1 tablet (75 mg total) by mouth once daily.  Start taking on: January 9, 2024     oxybutynin 5 MG Tab  Commonly known as: DITROPAN  Take 1 tablet (5 mg total) by mouth 2 (two) times daily.            CONTINUE taking these medications      acetaminophen-codeine 300-30mg 300-30 mg Tab  Commonly known as: TYLENOL #3     amLODIPine 10 MG tablet  Commonly known as: NORVASC  Take 1 tablet (10 mg total) by mouth once daily.     cinacalcet 30 MG Tab  Commonly known as: SENSIPAR  Take 1 tablet (30 mg total) by mouth once daily.     DULoxetine 60 MG capsule  Commonly known as: CYMBALTA  Take 1 capsule (60 mg  total) by mouth 2 (two) times daily.     gabapentin 300 MG capsule  Commonly known as: NEURONTIN  Take 1 capsule (300 mg total) by mouth 2 (two) times daily.     levothyroxine 75 MCG tablet  Commonly known as: SYNTHROID  Take 1 tablet (75 mcg total) by mouth once daily.     losartan 25 MG tablet  Commonly known as: COZAAR  Take 0.5 tablets (12.5 mg total) by mouth once daily.     metoprolol succinate 25 MG 24 hr tablet  Commonly known as: TOPROL-XL  Take 1 tablet (25 mg total) by mouth 2 (two) times daily.     traZODone 100 MG tablet  Commonly known as: DESYREL  Take 1 tablet (100 mg total) by mouth every evening.            STOP taking these medications      pravastatin 20 MG tablet  Commonly known as: PRAVACHOL               Where to Get Your Medications        These medications were sent to "[id number] - [place], [place] - [address] [facility] [address] [facility]  [address] [address]      Phone: [phone]   aspirin 81 MG Chew  atorvastatin 40 MG tablet  clopidogreL 75 mg tablet  DULoxetine 60 MG capsule  oxybutynin 5 MG Tab          Explained in detail to the patient about the discharge plan, medications, and follow-up visits. Pt understands and agrees with the treatment plan  Discharge Disposition: Home or Self Care   Discharged Condition: stable  Diet-   Dietary Orders (From admission, onward)       Start     Ordered    01/04/24 1533  Diet heart healthy  Diet effective now         01/04/24 1532    12/31/23 1805  Dietary nutrition supplements Boost Vanilla; BID  Continuous        Question Answer Comment   Select PO Supplement: Boost Vanilla    Frequency: BID        12/31/23 1805                   Medications Per DC med rec  Activities as tolerated   Follow-up Information       Mariely Soto MD. Schedule an appointment as soon as possible for a visit in 4 week(s).    Specialty: Internal Medicine  Contact information:  [address]  [facility] [address]  [place] [address]  [phone]                           For further questions contact hospitalist office    Discharge time 33 minutes    For worsening symptoms, chest pain, shortness of breath, increased abdominal pain, high grade fever, stroke or stroke like symptoms, immediately go to the nearest Emergency Room or call 911 as soon as possible.      Josué Moffett M.D, on 1/8/2024. at 1:58 PM.

## 2024-01-08 NOTE — PLAN OF CARE
01/08/24 1245   Final Note   Assessment Type Final Discharge Note   Anticipated Discharge Disposition SNF   Post-Acute Status   Post-Acute Authorization Placement     Transfer to TCU today. TCU will contact nurse for report. [facility] will transport at 1530.

## 2024-01-08 NOTE — PT/OT/SLP PROGRESS
Physical Therapy Treatment    Patient Name:  Edwin Boyer   MRN:  [id number]    Recommendations:     Discharge therapy intensity: Moderate Intensity Therapy   Discharge Equipment Recommendations: walker, rolling, to be determined by next level of care  Barriers to discharge: None    Assessment:     Edwin Boyer is a 89 y.o. female admitted with a medical diagnosis of NSTEMI (non-ST elevated myocardial infarction).  She presents with the following impairments/functional limitations: weakness, impaired endurance, impaired functional mobility, gait instability . Pt needed to have a diaper placed on her prior to getting her up secondary to her urinary incontinence. She did well with getting up and walking but despite her diaper, she still had lots of urinary incontinence that leaked  as she walked.    Rehab Prognosis: Good; patient would benefit from acute skilled PT services to address these deficits and reach maximum level of function.    Recent Surgery: Procedure(s) (LRB):  Left heart cath (N/A) 4 Days Post-Op    Plan:     During this hospitalization, patient to be seen 5 x/week to address the identified rehab impairments via gait training and progress toward the following goals:    Plan of Care Expires:  02/02/24    Subjective     Chief Complaint:   Patient/Family Comments/goals:   Pain/Comfort:  Pain Rating 1: 5/10  Location 1:  (headache)      Objective:     Communicated with nurse prior to session.  Patient found supine with   upon PT entry to room.     General Precautions: Standard, fall  Orthopedic Precautions: N/A  Braces: N/A  Respiratory Status: Nasal cannula, flow 2 L/min  Blood Pressure:   Skin Integrity: Visible skin intact      Functional Mobility:  Bed Mobility:     Supine to Sit: minimum assistance  Transfers:     Sit to Stand:  minimum assistance with rolling walker  Bed to Chair: minimum assistance with  rolling walker  using  Step Transfer  Gait: pt ambulated with a rw for about 80ft with cga.  Her diaper leaked of urine so we had to cut her walk short. Pt diaper was changed and she had her purewick reapplied.     Therapeutic Activities/Exercises:      Education:  Patient provided with verbal education education regarding PT role/goals/POC.  Understanding was verbalized.     Patient left up in chair with call button in reach..    GOALS:   Multidisciplinary Problems       Physical Therapy Goals          Problem: Physical Therapy    Goal Priority Disciplines Outcome Goal Variances Interventions   Physical Therapy Goal     PT, PT/OT Ongoing, Progressing     Description: Goals to be met by: 2024     Patient will increase functional independence with mobility by performin. Supine to sit with Stand-by Assistance  2. Sit to stand transfer with Stand-by Assistance  3. Bed to chair transfer with Stand-by Assistance using Rolling Walker  4. Gait  x 150 feet with Stand-by Assistance using Rolling Walker.                          Time Tracking:     PT Received On: 24  PT Start Time: 1045     PT Stop Time: 1110  PT Total Time (min): 25 min     Billable Minutes: Gait Training 25    Treatment Type: Treatment  PT/PTA: PT     Number of PTA visits since last PT visit: 4     2024

## 2024-01-08 NOTE — PLAN OF CARE
01/08/24 0956   Medicare Message   Important Message from Medicare regarding Discharge Appeal Rights Given to patient/caregiver;Explained to patient/caregiver

## 2024-01-10 ENCOUNTER — TELEPHONE (OUTPATIENT)
Dept: FAMILY MEDICINE | Facility: CLINIC | Age: 89
End: 2024-01-10
Payer: MEDICARE

## 2024-01-17 NOTE — PHYSICIAN QUERY
PT Name: Edwin Boyer  MR #: [id number]     DOCUMENTATION CLARIFICATION     CDS/: VIANEY Kingston, RN, CCDS               Contact information: [email or web address]  This form is a permanent document in the medical record.     Query Date: January 17, 2024    By submitting this query, we are merely seeking further clarification of documentation.  Please utilize your independent clinical judgment when addressing the question(s) below.  Provider, please provide the integumentary diagnosis related to the documentation of (coccyx/buttocks):   The Medical Record contains the following:    Wound care: WILDA Bui RN 1/3      Altered Skin Integrity 01/03/24 Coccyx Moisture associated dermatitis   Date First Assessed: 01/03/24   Altered Skin Integrity Present on Admission - Did Patient arrive to the hospital with altered skin?: yes  Location: Coccyx  Primary Wound Type: Moisture associated dermatitis   Wound Image    WOCN consulted for sacrum. Family at bedside. Educated patient and family on the importance of turning every 2 hours and using wedge and heel lift boots. She voiced understanding. Treatment recommendations put into place.  Sacrum: Cleanse with NS, dry well. Apply desitin,  abd pad, secure with medipore tape.  BID and PRN with soilage. Keep areas clean and dry, no adult briefs while in bed     LDA flowsheet: 1/8    Altered Skin Integrity 01/08/24 1930 Buttocks Moisture associated dermatitis Partial thickness tissue loss. Shallow open ulcer with a red or pink wound bed, without slough. Intact or Open/Ruptured Serum-filled blister.   Altered Skin Integrity Properties Date First Assessed: 01/08/24  -MA, 01/08/24 2134  [1] Time First Assessed: 1930  -MA, 01/08/24 2134 Altered Skin Integrity Present on Admission - Did Patient arrive to the hospital with altered skin?: yes  -, 01/03/24 1155 Location: Buttocks  -MA, 01/08/24 2134  [2] Primary Wound Type: Moisture ass  -, 01/03/24 1155 Description of  Altered Skin Integrity: Partial thickness tissue loss. Shallow open ulcer with a red or pink wound bed, without slough. Intact or Open/Ruptured Serum-filled blister.  -MA, 01/08/24 2134          The clinical guidelines noted are only a system guideline. It does not replace the providers clinical judgment.    Per the National Pressure Injury Advisory Panel:   A pressure injury is localized damage to the skin and underlying soft tissue usually over a bony prominence or related to a medical or other device. The injury can present as intact skin or an open ulcer and may be painful. The injury occurs as a result of intense and/or prolonged pressure or pressure in combination with shear. The tolerance of soft tissue for pressure and shear may also be affected by microclimate, nutrition, perfusion, co-morbidities and condition of the soft tissue.       Stage 1 Pressure Injury:  Intact skin with a localized area of non-blanchable erythema, which may appear differently in darkly pigmented skin. Color changes do not include purple or maroon discoloration; these may indicate deep tissue pressure injury.    Stage 2 Pressure Injury:  Partial-thickness loss of skin with exposed dermis. The wound bed is viable, pink or red, moist, and may also present as an intact or ruptured serum-filled blister.    Stage 3 Pressure Injury:  Full-thickness loss of skin, in which adipose (fat) is visible in the ulcer and granulation tissue and epibole (rolled wound edges) are often present. Slough and/or eschar may be visible. Undermining and tunneling may occur.    Stage 4 Pressure Injury:  Full-thickness skin and tissue loss with exposed or directly palpable fascia, muscle, tendon, ligament, cartilage or bone in the ulcer. Slough and/or eschar may be visible. Epibole (rolled edges), undermining and/or tunneling often occur.    Unstageable Pressure Injury:  Full-thickness skin and tissue loss in which the extent of tissue damage within the  ulcer cannot be confirmed because it is obscured by slough or eschar. If slough or eschar is removed, a Stage 3 or Stage 4 pressure injury will be revealed.    Deep Tissue Pressure Injury:  Intact or non-intact skin with localized area of persistent non-blanchable deep red, maroon, purple discoloration or epidermal separation revealing a dark wound bed or blood filled blister. This injury results from intense and/or prolonged pressure and shear forces at the bone-muscle interface. The wound may evolve rapidly to reveal the actual extent of tissue injury, or may resolve without tissue loss. If necrotic tissue, subcutaneous tissue, granulation tissue, fascia, muscle or other underlying structures are visible, this indicates a full thickness pressure injury (Unstageable, Stage 3 or Stage 4). Do not use DTPI to describe vascular, traumatic, neuropathic, or dermatologic conditions.   Medical Device Related Pressure Injury: This describes an etiology. Medical device related pressure injuries result from the use of devices designed and applied for diagnostic or therapeutic purposes. The resultant pressure injury generally conforms to the pattern or shape of the device. The injury should be staged using the staging system.    Mucosal Membrane Pressure Injury: Mucosal membrane pressure injury is found on mucous membranes with a history of a medical device in use at the location of the injury. Due to the anatomy of the tissue these ulcers cannot be staged.       Provider, please provide the integumentary diagnosis related to the documentation of (coccyx/buttocks): Please specify site: _____________________     [ xxx  ] Pressure Injury/Decubitus Ulcer, Stage 2   [   ] Moisture associated dermatitis due to Fecal, Urinary, or Dual Incontinence   [   ] Moisture associated dermatitis due to Friction or Contact with other specified body fluids   [   ] Moisture associated dermatitis due to Friction or Contact with body fluids,  unspecified   [   ] Other Integumentary Diagnosis (please specify):______________       Please document in your progress notes daily for the duration of treatment until resolved and include in your discharge summary.    Reference:    PABLITO Carvajal., CINTHIA Foster., Goldberg, M., PABLITO Stack, PABLITO Crenshaw, & SARINA Franz. (2016). Revised National Pressure Ulcer Advisory Panel Pressure Injury Staging System: Revised Pressure Injury Staging System. J Wound Ostomy Continence Nurs, 43(6), 585-597. doi:10.1097/won.2435067263400718    Form No.[id number]

## 2024-01-18 ENCOUNTER — EXTERNAL HOME HEALTH (OUTPATIENT)
Dept: HOME HEALTH SERVICES | Facility: HOSPITAL | Age: 89
End: 2024-01-18
Payer: MEDICARE

## 2024-01-22 ENCOUNTER — TELEPHONE (OUTPATIENT)
Dept: FAMILY MEDICINE | Facility: CLINIC | Age: 89
End: 2024-01-22
Payer: MEDICARE

## 2024-01-22 NOTE — TELEPHONE ENCOUNTER
----- Message from Madeleine Rodriguez sent at 1/22/2024  8:28 AM CST -----  Regarding: sooner appt  Type:  Sooner Apoointment Request    Caller is requesting a sooner appointment.  Caller declined first available appointment listed below.  Caller will not accept being placed on the waitlist and is requesting a message be sent to doctor.  Name of Caller:[facility]  When is the first available appointment?2/21  Symptoms:hosp f/u    Best Call Back Number:[phone]  Additional Information: pt is discharging from LTAC today and is needing a hospital f/u sooner than 2/21. Please contact pt when appt is made. Please advise

## 2024-01-22 NOTE — TELEPHONE ENCOUNTER
Patient being discharged, please find out if she is going home, if so, we will need hospital follow up with me within 10 days, please add on at noon M-Th

## 2024-01-22 NOTE — TELEPHONE ENCOUNTER
Per Bell  She spoke with pts son Eliza and he stated that pt is going to a nursing home  He also stated that he will reach out to us when they need an appt  Thanks

## 2024-01-23 ENCOUNTER — TELEPHONE (OUTPATIENT)
Dept: FAMILY MEDICINE | Facility: CLINIC | Age: 89
End: 2024-01-23
Payer: MEDICARE

## 2024-01-23 NOTE — TELEPHONE ENCOUNTER
I spoke with patients daughter Caitlin and the nursing home facility needs a copy of the patients immunizations. She requested this be faxed to Shadi Ar[facility] ([employer]). She was advised that it would be faxed today. I spoke with Emiliana at [facility] and records faxed to them at [phone]. Katina

## 2024-01-23 NOTE — TELEPHONE ENCOUNTER
----- Message from Yamilex Glover sent at 1/22/2024 11:20 AM CST -----  Regarding: medical advice  Type:  Needs Medical Advice    Who Called: Bisi    Would the patient rather a call back or a response via [facility]? Call back     Best Call Back Number: [phone]    Additional Information: Caitlin is requesting information regarding her mom immunization(is she took the pneumonia and flu). She would like a call back. Mom is being admitted in nursing home today.

## 2024-01-23 NOTE — TELEPHONE ENCOUNTER
----- Message from Yamilex Glover sent at 1/22/2024 11:20 AM CST -----  Regarding: medical advice  Type:  Needs Medical Advice    Who Called: Bisi    Would the patient rather a call back or a response via [facility]? Call back     Best Call Back Number: [phone]    Additional Information: Cailtin is requesting information regarding her mom immunization(is she took the pneumonia and flu). She would like a call back. Mom is being admitted in nursing home today.

## 2024-01-25 NOTE — PHYSICIAN QUERY
PT Name: Edwin Boyer  MR #: [id number]     DOCUMENTATION CLARIFICATION     CDS/: VIANEY Kingston, RN, CCDS               Contact information: [email or web address]  This form is a permanent document in the medical record.     Query Date: January 25, 2024    By submitting this query, we are merely seeking further clarification of documentation.  Please utilize your independent clinical judgment when addressing the question(s) below.  Provider, please provide the integumentary diagnosis related to the documentation of (buttocks/coccyx):   The Medical Record contains the following:    Wound care: WILDA Bui RN 1/3      Altered Skin Integrity 01/03/24 Coccyx Moisture associated dermatitis   Date First Assessed: 01/03/24   Altered Skin Integrity Present on Admission - Did Patient arrive to the hospital with altered skin?: yes  Location: Coccyx  Primary Wound Type: Moisture associated dermatitis   Wound Image      WOCN consulted for sacrum. Family at bedside. Educated patient and family on the importance of turning every 2 hours and using wedge and heel lift boots. She voiced understanding. Treatment recommendations put into place.  Sacrum: Cleanse with NS, dry well. Apply desitin,  abd pad, secure with medipore tape.  BID and PRN with soilage. Keep areas clean and dry, no adult briefs while in bed      LDA flowsheet: 1/8     Altered Skin Integrity 01/08/24 1930 Buttocks Moisture associated dermatitis Partial thickness tissue loss. Shallow open ulcer with a red or pink wound bed, without slough. Intact or Open/Ruptured Serum-filled blister.     Altered Skin Integrity Properties Date First Assessed: 01/08/24  -MA, 01/08/24 2134  [1] Time First Assessed: 1930  -MA, 01/08/24 2134 Altered Skin Integrity Present on Admission - Did Patient arrive to the hospital with altered skin?: yes  -, 01/03/24 1155 Location: Buttocks  -MA, 01/08/24 2134  [2] Primary Wound Type: Moisture ass  -, 01/03/24 1155 Description of  Altered Skin Integrity: Partial thickness tissue loss. Shallow open ulcer with a red or pink wound bed, without slough. Intact or Open/Ruptured Serum-filled blister.  -MA, 01/08/24 2134     [facility] flowsheet 1/2  Female External Urinary Catheter w/ Suction 12/29/23     H & P: Dr. Murillo 12/28  PMH: Urinary Incontinence     MAR  oxybutynin tablet 5 mg  Dose: 5 mg  Freq: 2 times daily Route: Oral  Start: 01/07/24 2100 End: 01/08/24 1713        Stage 1 Pressure Injury:  Intact skin with a localized area of non-blanchable erythema, which may appear differently in darkly pigmented skin. Color changes do not include purple or maroon discoloration; these may indicate deep tissue pressure injury.    Stage 2 Pressure Injury:  Partial-thickness loss of skin with exposed dermis. The wound bed is viable, pink or red, moist, and may also present as an intact or ruptured serum-filled blister.    Stage 3 Pressure Injury:  Full-thickness loss of skin, in which adipose (fat) is visible in the ulcer and granulation tissue and epibole (rolled wound edges) are often present. Slough and/or eschar may be visible. Undermining and tunneling may occur.    Stage 4 Pressure Injury:  Full-thickness skin and tissue loss with exposed or directly palpable fascia, muscle, tendon, ligament, cartilage or bone in the ulcer. Slough and/or eschar may be visible. Epibole (rolled edges), undermining and/or tunneling often occur.    Unstageable Pressure Injury:  Full-thickness skin and tissue loss in which the extent of tissue damage within the ulcer cannot be confirmed because it is obscured by slough or eschar. If slough or eschar is removed, a Stage 3 or Stage 4 pressure injury will be revealed.    Deep Tissue Pressure Injury:  Intact or non-intact skin with localized area of persistent non-blanchable deep red, maroon, purple discoloration or epidermal separation revealing a dark wound bed or blood filled blister. This injury results from intense and/or  prolonged pressure and shear forces at the bone-muscle interface. The wound may evolve rapidly to reveal the actual extent of tissue injury, or may resolve without tissue loss. If necrotic tissue, subcutaneous tissue, granulation tissue, fascia, muscle or other underlying structures are visible, this indicates a full thickness pressure injury (Unstageable, Stage 3 or Stage 4). Do not use DTPI to describe vascular, traumatic, neuropathic, or dermatologic conditions.       Provider, please provide the integumentary diagnosis related to the documentation of (buttocks/coccyx):     [   ] Pressure Injury/Decubitus Ulcer, Stage 2    [   ] Moisture associated dermatitis due to Fecal, Urinary, or Dual Incontinence to buttocks   [   ] Moisture associated dermatitis due to Fecal, Urinary, or Dual Incontinence to coccyx   [   ] Moisture associated dermatitis due to Friction or Contact with body fluids, unspecified to buttocks   [ xxx  ] Moisture associated dermatitis due to Friction or Contact with body fluids, unspecified to coccyx   [   ] Other Integumentary Diagnosis (please specify):______________       Please document in your progress notes daily for the duration of treatment until resolved and include in your discharge summary.    Reference:    PABLITO Carvajal., CINTHIA Foster., Goldberg, M., MARCELA Stack., MARCELA Crenshaw., & SARINA Franz. (2016). Revised National Pressure Ulcer Advisory Panel Pressure Injury Staging System: Revised Pressure Injury Staging System. J Wound Ostomy Continence Nurs, 43(6), 585-597. doi:10.1097/won.8331540387654460    Form No.[id number]

## 2024-01-29 ENCOUNTER — TELEPHONE (OUTPATIENT)
Dept: FAMILY MEDICINE | Facility: CLINIC | Age: 89
End: 2024-01-29
Payer: MEDICARE

## 2024-01-29 DIAGNOSIS — E83.52 HYPERCALCEMIA: ICD-10-CM

## 2024-01-29 NOTE — TELEPHONE ENCOUNTER
----- Message from Robyn Harry sent at 1/26/2024  9:45 AM CST -----  Regarding: return call  Type:  Patient Returning Call    Who Called: pt son (siddharth)    Who Left Message for Patient: none    Does the patient know what this is regarding?: questions of listed medication below    Would the patient rather a call back or a response via [facility]?  Yes    Best Call Back Number: [phone]    Additional Information:  pt son called for return call back in regards to medication (cinacalcet (SENSIPAR) 30 MG Tab), pt son stated pt will be going into nursing home and wanted to discuss about listed medication, please advise, thanks

## 2024-01-29 NOTE — TELEPHONE ENCOUNTER
Pts son called stating that pt is at [facility]  Is there a substitute medication for the cinacalcet due to this medication not being covered on the pts new insurance plan  Please advise  Thanks

## 2024-01-30 ENCOUNTER — LAB REQUISITION (OUTPATIENT)
Dept: LAB | Facility: HOSPITAL | Age: 89
End: 2024-01-30
Payer: MEDICARE

## 2024-01-30 DIAGNOSIS — E03.9 HYPOTHYROIDISM, UNSPECIFIED: ICD-10-CM

## 2024-01-30 LAB
ALBUMIN SERPL-MCNC: 3.4 G/DL (ref 3.4–4.8)
ALBUMIN/GLOB SERPL: 1.5 RATIO (ref 1.1–2)
ALP SERPL-CCNC: 66 UNIT/L (ref 40–150)
ALT SERPL-CCNC: 10 UNIT/L (ref 0–55)
AST SERPL-CCNC: 16 UNIT/L (ref 5–34)
BASOPHILS # BLD AUTO: 0.03 X10(3)/MCL
BASOPHILS NFR BLD AUTO: 0.5 %
BILIRUB SERPL-MCNC: 0.4 MG/DL
BUN SERPL-MCNC: 22.7 MG/DL (ref 9.8–20.1)
CALCIUM SERPL-MCNC: 8.1 MG/DL (ref 8.4–10.2)
CHLORIDE SERPL-SCNC: 106 MMOL/L (ref 98–107)
CHOLEST SERPL-MCNC: 115 MG/DL
CHOLEST/HDLC SERPL: 3 {RATIO} (ref 0–5)
CO2 SERPL-SCNC: 25 MMOL/L (ref 23–31)
CREAT SERPL-MCNC: 0.93 MG/DL (ref 0.55–1.02)
EOSINOPHIL # BLD AUTO: 0.26 X10(3)/MCL (ref 0–0.9)
EOSINOPHIL NFR BLD AUTO: 4.3 %
ERYTHROCYTE [DISTWIDTH] IN BLOOD BY AUTOMATED COUNT: 13.1 % (ref 11.5–17)
GFR SERPLBLD CREATININE-BSD FMLA CKD-EPI: 59 MLS/MIN/1.73/M2
GLOBULIN SER-MCNC: 2.3 GM/DL (ref 2.4–3.5)
GLUCOSE SERPL-MCNC: 84 MG/DL (ref 82–115)
HCT VFR BLD AUTO: 35.9 % (ref 37–47)
HDLC SERPL-MCNC: 43 MG/DL (ref 35–60)
HGB BLD-MCNC: 11.8 G/DL (ref 12–16)
IMM GRANULOCYTES # BLD AUTO: 0.02 X10(3)/MCL (ref 0–0.04)
IMM GRANULOCYTES NFR BLD AUTO: 0.3 %
LDLC SERPL CALC-MCNC: 53 MG/DL (ref 50–140)
LYMPHOCYTES # BLD AUTO: 1.98 X10(3)/MCL (ref 0.6–4.6)
LYMPHOCYTES NFR BLD AUTO: 32.8 %
MCH RBC QN AUTO: 32.1 PG (ref 27–31)
MCHC RBC AUTO-ENTMCNC: 32.9 G/DL (ref 33–36)
MCV RBC AUTO: 97.6 FL (ref 80–94)
MONOCYTES # BLD AUTO: 0.76 X10(3)/MCL (ref 0.1–1.3)
MONOCYTES NFR BLD AUTO: 12.6 %
NEUTROPHILS # BLD AUTO: 2.99 X10(3)/MCL (ref 2.1–9.2)
NEUTROPHILS NFR BLD AUTO: 49.5 %
NRBC BLD AUTO-RTO: 0 %
PLATELET # BLD AUTO: 233 X10(3)/MCL (ref 130–400)
PMV BLD AUTO: 9.6 FL (ref 7.4–10.4)
POTASSIUM SERPL-SCNC: 4.4 MMOL/L (ref 3.5–5.1)
PROT SERPL-MCNC: 5.7 GM/DL (ref 5.8–7.6)
RBC # BLD AUTO: 3.68 X10(6)/MCL (ref 4.2–5.4)
SODIUM SERPL-SCNC: 139 MMOL/L (ref 136–145)
TRIGL SERPL-MCNC: 95 MG/DL (ref 37–140)
TSH SERPL-ACNC: 3.04 UIU/ML (ref 0.35–4.94)
VLDLC SERPL CALC-MCNC: 19 MG/DL
WBC # SPEC AUTO: 6.04 X10(3)/MCL (ref 4.5–11.5)

## 2024-01-30 PROCEDURE — 80053 COMPREHEN METABOLIC PANEL: CPT | Performed by: NURSE PRACTITIONER

## 2024-01-30 PROCEDURE — 84443 ASSAY THYROID STIM HORMONE: CPT | Performed by: NURSE PRACTITIONER

## 2024-01-30 PROCEDURE — 80061 LIPID PANEL: CPT | Performed by: NURSE PRACTITIONER

## 2024-01-30 PROCEDURE — 85025 COMPLETE CBC W/AUTO DIFF WBC: CPT | Performed by: NURSE PRACTITIONER

## 2024-02-07 DIAGNOSIS — R13.12 OROPHARYNGEAL DYSPHAGIA: Primary | ICD-10-CM

## 2024-02-08 NOTE — PHYSICIAN QUERY
PT Name: Edwin Boyer  MR #: [id number]     DOCUMENTATION CLARIFICATION     CDS/: VIANEY Kingston, RN, CCDS              Contact information: [email or web address]  This form is a permanent document in the medical record.     Query Date: February 8, 2024    By submitting this query, we are merely seeking further clarification of documentation.  Please utilize your independent clinical judgment when addressing the question(s) below.      The Medical Record contains the following:    Clinical Information Location in Medical Records       Altered Skin Integrity 01/03/24 Coccyx Moisture associated dermatitis   Date First Assessed: 01/03/24   Altered Skin Integrity Present on Admission - Did Patient arrive to the hospital with altered skin?: yes  Location: Coccyx  Primary Wound Type: Moisture associated dermatitis   Wound Image       WOCN consulted for sacrum. Family at bedside. Educated patient and family on the importance of turning every 2 hours and using wedge and heel lift boots. She voiced understanding. Treatment recommendations put into place.  Sacrum: Cleanse with NS, dry well. Apply desitin,  abd pad, secure with medipore tape.  BID and PRN with soilage. Keep areas clean and dry, no adult briefs while in bed      Altered Skin Integrity 01/08/24 1930 Buttocks Moisture associated dermatitis Partial thickness tissue loss. Shallow open ulcer with a red or pink wound bed, without slough. Intact or Open/Ruptured Serum-filled blister.      Altered Skin Integrity Properties Date First Assessed: 01/08/24  -MA, 01/08/24 2134  [1] Time First Assessed: 1930  -MA, 01/08/24 2134 Altered Skin Integrity Present on Admission - Did Patient arrive to the hospital with altered skin?: yes  -, 01/03/24 1155 Location: Buttocks  -MA, 01/08/24 2134  [2] Primary Wound Type: Moisture ass  -, 01/03/24 1155 Description of Altered Skin Integrity: Partial thickness tissue loss. Shallow open ulcer with a red or pink wound bed,  without slough. Intact or Open/Ruptured Serum-filled blister.  -MA, 01/08/24 2134      Female External Urinary Catheter w/ Suction 12/29/23      PMH: Urinary Incontinence      oxybutynin tablet 5 mg  Dose: 5 mg  Freq: 2 times daily Route: Oral  Start: 01/07/24 2100 End: 01/08/24 1713     Moisture associated dermatitis due to Friction or Contact with body fluids, unspecified to coccyx       Wound care: WILDA Bui RN 1/3                                                            LDA flowsheet: 1/8                            LDA flowsheet 1/2    H & P: Dr. Murillo 12/28    MAR          Physician Query: Dr. Crooks 1/8     According to coding guidelines, Present on Admission is defined as present at the time the order for inpatient admission occurs. Conditions that develop during an outpatient encounter, including emergency department, observation, or outpatient surgery, are considered as present on admission.       Please clarify the Present on Admission (POA) status of the diagnosis: __Moisture associated dermatitis due to Friction or Contact with body fluids, unspecified to coccyx _______  [  xx] Yes (Y)   [  ] No (N)   [  ] Clinically Undetermined (W)     Reference:  ICD-10-CM Official Guidelines for Coding and Reporting FY 2021. (2020). Retrieved October 21, 2020, from https://www.cdc.gov/nchs/data/icd/10cmguidelines-FY2021.pdf?fbclid=[email or web address]    Form No. [id number]

## 2024-02-09 PROCEDURE — 81003 URINALYSIS AUTO W/O SCOPE: CPT | Performed by: NURSE PRACTITIONER

## 2024-02-10 ENCOUNTER — LAB REQUISITION (OUTPATIENT)
Dept: LAB | Facility: HOSPITAL | Age: 89
End: 2024-02-10
Payer: MEDICARE

## 2024-02-10 DIAGNOSIS — R52 PAIN, UNSPECIFIED: ICD-10-CM

## 2024-02-10 LAB
APPEARANCE UR: CLEAR
BILIRUB UR QL STRIP.AUTO: NEGATIVE
COLOR UR AUTO: NORMAL
GLUCOSE UR QL STRIP.AUTO: NEGATIVE
KETONES UR QL STRIP.AUTO: NEGATIVE
LEUKOCYTE ESTERASE UR QL STRIP.AUTO: NEGATIVE
NITRITE UR QL STRIP.AUTO: NEGATIVE
PH UR STRIP.AUTO: 6 [PH]
PROT UR QL STRIP.AUTO: NEGATIVE
RBC UR QL AUTO: NEGATIVE
SP GR UR STRIP.AUTO: 1.01 (ref 1–1.03)
UROBILINOGEN UR STRIP-ACNC: 0.2

## 2024-02-15 ENCOUNTER — TELEPHONE (OUTPATIENT)
Dept: FAMILY MEDICINE | Facility: CLINIC | Age: 89
End: 2024-02-15
Payer: MEDICARE

## 2024-02-15 NOTE — TELEPHONE ENCOUNTER
----- Message from Donna Lay sent at 2/15/2024 10:17 AM CST -----  .Type:  Needs Medical Advice    Who Called: [facility]   Symptoms (please be specific):    How long has patient had these symptoms:    Pharmacy name and phone #:    Would the patient rather a call back or a response via [facility]?   Best Call Back Number: fax [phone]  Additional Information: needs pt vaccine record

## 2024-02-16 ENCOUNTER — CLINICAL SUPPORT (OUTPATIENT)
Dept: REHABILITATION | Facility: HOSPITAL | Age: 89
End: 2024-02-16
Attending: THORACIC SURGERY (CARDIOTHORACIC VASCULAR SURGERY)
Payer: MEDICARE

## 2024-02-16 ENCOUNTER — HOSPITAL ENCOUNTER (OUTPATIENT)
Dept: RADIOLOGY | Facility: HOSPITAL | Age: 89
Discharge: HOME | End: 2024-02-16
Attending: THORACIC SURGERY (CARDIOTHORACIC VASCULAR SURGERY)
Payer: MEDICARE

## 2024-02-16 DIAGNOSIS — R13.12 OROPHARYNGEAL DYSPHAGIA: ICD-10-CM

## 2024-02-16 PROCEDURE — 74230 X-RAY XM SWLNG FUNCJ C+: CPT | Mod: TC

## 2024-02-16 PROCEDURE — 92611 MOTION FLUOROSCOPY/SWALLOW: CPT

## 2024-02-16 PROCEDURE — 25500020 PHARM REV CODE 255

## 2024-02-16 PROCEDURE — A9698 NON-RAD CONTRAST MATERIALNOC: HCPCS

## 2024-02-16 RX ADMIN — BARIUM SULFATE 10 ML: 0.81 POWDER, FOR SUSPENSION ORAL at 09:02

## 2024-02-16 NOTE — PROGRESS NOTES
"[facility]  Speech Language Pathology Department  Outpatient Modified Barium Swallow Study    Patient Name:  Edwin Boyer   MRN:  [id number]    Recommendations:     General recommendations:  no SLP intervention indicated  Diet texture/consistency recommendations: Regular solids (IDDSI 7) and thin liquids (IDDSI 0)  Medications: whole in puree  Swallow strategies/precautions: small bites/sips, NO straws, and single cup sips    History/Reason for Referral:     Edwin Boyer is a/n 89 y.o. female referred by PCP for a Modified Barium Swallow Study due to recent dx of pneumonia.  Pt reports recent hospitalization due to NSTEMI at which time she was noted to have community acquired pneumonia.  Pt evaluated by this department with oropharyngeal swallowing WFL (regular solids, thin liquids, meds crushed in pudding).  Pt discharged to TCU and was evaluated (recommendations of regular solids however chopped meats due to patient request with difficulty cutting foods) then subsequently discharged to SNF.  Pt presents today for comprehensive assessment of swallow function.    Pt reports difficulty with PO intake when she eats/drinks with fast rate.    Home diet texture/consistency: Easy to Chew and thin liquids  Current Method of Nutrition:  PO diet    Patient complaint: "To eat regular foods that aren't chopped."    Subjective:     Patient awake and alert.    Spiritual/Cultural/Yazidism Beliefs/Practices that affect care: no  Pain/Comfort: no    Respiratory Status: room air  Restraints/positioning devices: none    Pt is accompanied by her son.    Fluoroscopic Results:     Oral Musculature  Dentition: own teeth  Secretion Management: adequate  Mucosal Quality: good  Facial Movement: WFL  Buccal Strength & Mobility: WFL  Mandibular Strength & Mobility: WFL  Oral Labial Strength & Mobility: WFL  Lingual Strength & Mobility: WFL  Vocal Quality: hoarse  Volitional Cough: Strong    Positioning: " seated in straight chair  Visualization  Patient was seen in the lateral view    Oral Phase:   Loss of bolus control with prespill to the pyriform sinuses with thin liquids    Pharyngeal Phase:   Swallow delay with spill to the pyriform sinuses with thin liquids  Reduced base of tongue retraction  Reduced hyolaryngeal excursion  Laryngeal penetration of thin liquids  Consistency Laryngeal Penetration Aspiration Residue   Thin liquid by cup-single sips During the swallow  Cleared during the swallow None None   Puree None None Mild  Base of tongue   Chewable solid None None Mild  Base of tongue and Valleculae   Thin liquid by cup-large sip During the swallow  Did NOT clear None None   Thin liquid by straw Before the swallow  Did NOT clear None Mild  Base of tongue and Valleculae  Reduced with second swallow       Cervical Esophageal Phase:   UES appeared to accommodate all bolus types without stasis or retrograde movement visualized      Assessment:     Pt exhibited mild oropharyngeal dysphagia characterized by the findings noted above.  Laryngeal penetration of thin liquids was visualized and did NOT clear from the laryngeal vestibule with large sips or straw sips.  Small sip strategy.  Both swallow safety and swallow efficiency are preserved,     Patient appears to be at low risk for aspiration related pneumonia when considering  prior medical hx .    Patient Education:     Patient and son/s were provided with verbal education regarding results.  Understanding was verbalized.    Time Tracking:     SLP Treatment Date:  2/16/24  Speech Start Time:  0915  Speech Stop Time:  0945     Speech Total Time (min):  30 minutes    Billable minutes:   Motion Fluoroscopic Evaluation, Video Recording, 30 minutes     02/16/2024

## 2024-03-21 ENCOUNTER — LAB REQUISITION (OUTPATIENT)
Dept: LAB | Facility: HOSPITAL | Age: 89
End: 2024-03-21
Payer: MEDICARE

## 2024-03-21 DIAGNOSIS — E61.2 MAGNESIUM DEFICIENCY: ICD-10-CM

## 2024-03-21 LAB — MAGNESIUM SERPL-MCNC: 1.7 MG/DL (ref 1.6–2.6)

## 2024-03-21 PROCEDURE — 83735 ASSAY OF MAGNESIUM: CPT | Performed by: FAMILY MEDICINE

## 2024-04-01 PROBLEM — I21.4 NSTEMI (NON-ST ELEVATED MYOCARDIAL INFARCTION): Status: RESOLVED | Noted: 2023-12-30 | Resolved: 2024-04-01

## 2024-04-08 PROBLEM — J18.9 CAP (COMMUNITY ACQUIRED PNEUMONIA): Status: RESOLVED | Noted: 2024-01-08 | Resolved: 2024-04-08

## 2024-07-03 ENCOUNTER — LAB REQUISITION (OUTPATIENT)
Dept: LAB | Facility: HOSPITAL | Age: 89
End: 2024-07-03
Payer: MEDICARE

## 2024-07-03 DIAGNOSIS — D64.9 ANEMIA, UNSPECIFIED: ICD-10-CM

## 2024-07-03 LAB
ALBUMIN SERPL-MCNC: 3.6 G/DL (ref 3.4–4.8)
ALBUMIN/GLOB SERPL: 1.4 RATIO (ref 1.1–2)
ALP SERPL-CCNC: 77 UNIT/L (ref 40–150)
ALT SERPL-CCNC: 13 UNIT/L (ref 0–55)
ANION GAP SERPL CALC-SCNC: 11 MEQ/L
AST SERPL-CCNC: 16 UNIT/L (ref 5–34)
BASOPHILS # BLD AUTO: 0.03 X10(3)/MCL
BASOPHILS NFR BLD AUTO: 0.4 %
BILIRUB SERPL-MCNC: 0.5 MG/DL
BUN SERPL-MCNC: 32.4 MG/DL (ref 9.8–20.1)
CALCIUM SERPL-MCNC: 8.3 MG/DL (ref 8.4–10.2)
CHLORIDE SERPL-SCNC: 105 MMOL/L (ref 98–107)
CHOLEST SERPL-MCNC: 122 MG/DL
CHOLEST/HDLC SERPL: 3 {RATIO} (ref 0–5)
CO2 SERPL-SCNC: 27 MMOL/L (ref 23–31)
CREAT SERPL-MCNC: 1.17 MG/DL (ref 0.55–1.02)
CREAT/UREA NIT SERPL: 28
EOSINOPHIL # BLD AUTO: 0.35 X10(3)/MCL (ref 0–0.9)
EOSINOPHIL NFR BLD AUTO: 4.5 %
ERYTHROCYTE [DISTWIDTH] IN BLOOD BY AUTOMATED COUNT: 13.5 % (ref 11.5–17)
GFR SERPLBLD CREATININE-BSD FMLA CKD-EPI: 45 ML/MIN/1.73/M2
GLOBULIN SER-MCNC: 2.5 GM/DL (ref 2.4–3.5)
GLUCOSE SERPL-MCNC: 75 MG/DL (ref 82–115)
HCT VFR BLD AUTO: 41.3 % (ref 37–47)
HDLC SERPL-MCNC: 36 MG/DL (ref 35–60)
HGB BLD-MCNC: 13.4 G/DL (ref 12–16)
IMM GRANULOCYTES # BLD AUTO: 0.01 X10(3)/MCL (ref 0–0.04)
IMM GRANULOCYTES NFR BLD AUTO: 0.1 %
LDLC SERPL CALC-MCNC: 61 MG/DL (ref 50–140)
LYMPHOCYTES # BLD AUTO: 1.92 X10(3)/MCL (ref 0.6–4.6)
LYMPHOCYTES NFR BLD AUTO: 24.9 %
MCH RBC QN AUTO: 31.9 PG (ref 27–31)
MCHC RBC AUTO-ENTMCNC: 32.4 G/DL (ref 33–36)
MCV RBC AUTO: 98.3 FL (ref 80–94)
MONOCYTES # BLD AUTO: 0.89 X10(3)/MCL (ref 0.1–1.3)
MONOCYTES NFR BLD AUTO: 11.6 %
NEUTROPHILS # BLD AUTO: 4.5 X10(3)/MCL (ref 2.1–9.2)
NEUTROPHILS NFR BLD AUTO: 58.5 %
NRBC BLD AUTO-RTO: 0 %
PLATELET # BLD AUTO: 190 X10(3)/MCL (ref 130–400)
PMV BLD AUTO: 9.8 FL (ref 7.4–10.4)
POTASSIUM SERPL-SCNC: 4.5 MMOL/L (ref 3.5–5.1)
PROT SERPL-MCNC: 6.1 GM/DL (ref 5.8–7.6)
RBC # BLD AUTO: 4.2 X10(6)/MCL (ref 4.2–5.4)
SODIUM SERPL-SCNC: 143 MMOL/L (ref 136–145)
TRIGL SERPL-MCNC: 123 MG/DL (ref 37–140)
TSH SERPL-ACNC: 1.51 UIU/ML (ref 0.35–4.94)
VLDLC SERPL CALC-MCNC: 25 MG/DL
WBC # BLD AUTO: 7.7 X10(3)/MCL (ref 4.5–11.5)

## 2024-07-03 PROCEDURE — 84443 ASSAY THYROID STIM HORMONE: CPT | Performed by: FAMILY MEDICINE

## 2024-07-03 PROCEDURE — 85025 COMPLETE CBC W/AUTO DIFF WBC: CPT | Performed by: FAMILY MEDICINE

## 2024-07-03 PROCEDURE — 80053 COMPREHEN METABOLIC PANEL: CPT | Performed by: FAMILY MEDICINE

## 2024-07-03 PROCEDURE — 80061 LIPID PANEL: CPT | Performed by: FAMILY MEDICINE

## 2024-07-27 ENCOUNTER — HOSPITAL ENCOUNTER (EMERGENCY)
Facility: HOSPITAL | Age: 89
Discharge: HOME | End: 2024-07-27
Attending: EMERGENCY MEDICINE
Payer: MEDICARE

## 2024-07-27 VITALS
SYSTOLIC BLOOD PRESSURE: 124 MMHG | HEIGHT: 61 IN | DIASTOLIC BLOOD PRESSURE: 85 MMHG | OXYGEN SATURATION: 97 % | BODY MASS INDEX: 25.49 KG/M2 | TEMPERATURE: 97 F | RESPIRATION RATE: 18 BRPM | WEIGHT: 135 LBS | HEART RATE: 86 BPM

## 2024-07-27 DIAGNOSIS — N39.0 URINARY TRACT INFECTION WITHOUT HEMATURIA, SITE UNSPECIFIED: Primary | ICD-10-CM

## 2024-07-27 DIAGNOSIS — R45.1 AGITATED: ICD-10-CM

## 2024-07-27 LAB
ALBUMIN SERPL-MCNC: 3.6 G/DL (ref 3.4–4.8)
ALBUMIN/GLOB SERPL: 1.2 RATIO (ref 1.1–2)
ALP SERPL-CCNC: 62 UNIT/L (ref 40–150)
ALT SERPL-CCNC: 13 UNIT/L (ref 0–55)
ANION GAP SERPL CALC-SCNC: 9 MEQ/L
AST SERPL-CCNC: 21 UNIT/L (ref 5–34)
BACTERIA #/AREA URNS AUTO: ABNORMAL /HPF
BASOPHILS # BLD AUTO: 0.03 X10(3)/MCL
BASOPHILS NFR BLD AUTO: 0.3 %
BILIRUB SERPL-MCNC: 0.7 MG/DL
BILIRUB UR QL STRIP.AUTO: NEGATIVE
BUN SERPL-MCNC: 25.3 MG/DL (ref 9.8–20.1)
CALCIUM SERPL-MCNC: 8.4 MG/DL (ref 8.4–10.2)
CHLORIDE SERPL-SCNC: 102 MMOL/L (ref 98–107)
CLARITY UR: ABNORMAL
CO2 SERPL-SCNC: 26 MMOL/L (ref 23–31)
COLOR UR AUTO: YELLOW
CREAT SERPL-MCNC: 0.99 MG/DL (ref 0.55–1.02)
CREAT/UREA NIT SERPL: 26
EOSINOPHIL # BLD AUTO: 0.18 X10(3)/MCL (ref 0–0.9)
EOSINOPHIL NFR BLD AUTO: 1.8 %
ERYTHROCYTE [DISTWIDTH] IN BLOOD BY AUTOMATED COUNT: 12.6 % (ref 11.5–17)
GFR SERPLBLD CREATININE-BSD FMLA CKD-EPI: 55 ML/MIN/1.73/M2
GLOBULIN SER-MCNC: 3 GM/DL (ref 2.4–3.5)
GLUCOSE SERPL-MCNC: 94 MG/DL (ref 82–115)
GLUCOSE UR QL STRIP: NORMAL
HCT VFR BLD AUTO: 43.4 % (ref 37–47)
HGB BLD-MCNC: 14.7 G/DL (ref 12–16)
HGB UR QL STRIP: ABNORMAL
IMM GRANULOCYTES # BLD AUTO: 0.02 X10(3)/MCL (ref 0–0.04)
IMM GRANULOCYTES NFR BLD AUTO: 0.2 %
KETONES UR QL STRIP: NEGATIVE
LEUKOCYTE ESTERASE UR QL STRIP: 500
LYMPHOCYTES # BLD AUTO: 2.09 X10(3)/MCL (ref 0.6–4.6)
LYMPHOCYTES NFR BLD AUTO: 20.4 %
MCH RBC QN AUTO: 32 PG (ref 27–31)
MCHC RBC AUTO-ENTMCNC: 33.9 G/DL (ref 33–36)
MCV RBC AUTO: 94.6 FL (ref 80–94)
MONOCYTES # BLD AUTO: 1.07 X10(3)/MCL (ref 0.1–1.3)
MONOCYTES NFR BLD AUTO: 10.5 %
MUCOUS THREADS URNS QL MICRO: ABNORMAL /LPF
NEUTROPHILS # BLD AUTO: 6.84 X10(3)/MCL (ref 2.1–9.2)
NEUTROPHILS NFR BLD AUTO: 66.8 %
NITRITE UR QL STRIP: ABNORMAL
NRBC BLD AUTO-RTO: 0 %
PH UR STRIP: 6 [PH]
PLATELET # BLD AUTO: 170 X10(3)/MCL (ref 130–400)
PMV BLD AUTO: 9.2 FL (ref 7.4–10.4)
POTASSIUM SERPL-SCNC: 4.2 MMOL/L (ref 3.5–5.1)
PROT SERPL-MCNC: 6.6 GM/DL (ref 5.8–7.6)
PROT UR QL STRIP: ABNORMAL
RBC # BLD AUTO: 4.59 X10(6)/MCL (ref 4.2–5.4)
RBC #/AREA URNS AUTO: ABNORMAL /HPF
SODIUM SERPL-SCNC: 137 MMOL/L (ref 136–145)
SP GR UR STRIP.AUTO: 1.01 (ref 1–1.03)
SQUAMOUS #/AREA URNS LPF: ABNORMAL /HPF
TROPONIN I SERPL-MCNC: <0.01 NG/ML (ref 0–0.04)
UROBILINOGEN UR STRIP-ACNC: NORMAL
WBC # BLD AUTO: 10.23 X10(3)/MCL (ref 4.5–11.5)
WBC #/AREA URNS AUTO: >100 /HPF

## 2024-07-27 PROCEDURE — 99285 EMERGENCY DEPT VISIT HI MDM: CPT | Mod: 25

## 2024-07-27 PROCEDURE — 80053 COMPREHEN METABOLIC PANEL: CPT | Performed by: NURSE PRACTITIONER

## 2024-07-27 PROCEDURE — 81001 URINALYSIS AUTO W/SCOPE: CPT | Performed by: NURSE PRACTITIONER

## 2024-07-27 PROCEDURE — 87186 SC STD MICRODIL/AGAR DIL: CPT | Performed by: NURSE PRACTITIONER

## 2024-07-27 PROCEDURE — 87086 URINE CULTURE/COLONY COUNT: CPT | Performed by: NURSE PRACTITIONER

## 2024-07-27 PROCEDURE — 93010 ELECTROCARDIOGRAM REPORT: CPT | Mod: ,,, | Performed by: INTERNAL MEDICINE

## 2024-07-27 PROCEDURE — 25000003 PHARM REV CODE 250: Performed by: NURSE PRACTITIONER

## 2024-07-27 PROCEDURE — 85025 COMPLETE CBC W/AUTO DIFF WBC: CPT | Performed by: NURSE PRACTITIONER

## 2024-07-27 PROCEDURE — 93005 ELECTROCARDIOGRAM TRACING: CPT

## 2024-07-27 PROCEDURE — 84484 ASSAY OF TROPONIN QUANT: CPT | Performed by: NURSE PRACTITIONER

## 2024-07-27 RX ORDER — CEFDINIR 300 MG/1
300 CAPSULE ORAL
Status: COMPLETED | OUTPATIENT
Start: 2024-07-27 | End: 2024-07-27

## 2024-07-27 RX ORDER — CEFDINIR 300 MG/1
300 CAPSULE ORAL 2 TIMES DAILY
Qty: 20 CAPSULE | Refills: 0 | Status: SHIPPED | OUTPATIENT
Start: 2024-07-27 | End: 2024-08-06

## 2024-07-27 RX ADMIN — CEFDINIR 300 MG: 300 CAPSULE ORAL at 09:07

## 2024-07-27 NOTE — ED PROVIDER NOTES
Encounter Date: 2024       History     Chief Complaint   Patient presents with    Agitation     Sent from NH, for anxiety and agiatation     See MDM    The history is provided by the patient and the nursing home. No  was used.     Review of patient's allergies indicates:   Allergen Reactions    Meperidine      Other reaction(s): EXCITATIVE TYPE PSYCHOSIS    Sulfamethoxazole      Past Medical History:   Diagnosis Date    Anxiety 2022    Carpal tunnel syndrome, bilateral     Chronic back pain     CKD (chronic kidney disease) stage 3, GFR 30-59 ml/min 2022    Fibromyalgia     GERD with esophagitis 2022    HTN (hypertension), benign 2022    Hyperlipidemia 2022    Hypothyroidism 2022    Insomnia 2022    OA (osteoarthritis) 2022    Osteoporosis     Primary hyperparathyroidism 2023    Urinary incontinence 2022     Past Surgical History:   Procedure Laterality Date    APPENDECTOMY       SECTION      COLONOSCOPY      HYSTERECTOMY      LEFT HEART CATHETERIZATION N/A 2024    Procedure: Left heart cath;  Surgeon: David Banegas Jr., MD;  Location: [facility] CATH LAB;  Service: Cardiology;  Laterality: N/A;    TOTAL KNEE ARTHROPLASTY       Family History   Problem Relation Name Age of Onset    No Known Problems Mother      No Known Problems Father       Social History     Tobacco Use    Smoking status: Never    Smokeless tobacco: Never   Substance Use Topics    Alcohol use: Never    Drug use: Never     Review of Systems   Constitutional:  Negative for fever.   Respiratory:  Negative for cough and shortness of breath.    Cardiovascular:  Negative for chest pain.   Gastrointestinal:  Positive for rectal pain. Negative for abdominal pain.   Genitourinary:  Negative for difficulty urinating and dysuria.   Musculoskeletal:  Negative for gait problem.   Skin:  Negative for color change.   Neurological:  Negative for dizziness, speech difficulty and headaches.    Psychiatric/Behavioral:  Negative for hallucinations and suicidal ideas.    All other systems reviewed and are negative.      Physical Exam     Initial Vitals [07/27/24 1750]   BP Pulse Resp Temp SpO2   128/64 73 18 97 °F (36.1 °C) 99 %      MAP       --         Physical Exam    Nursing note and vitals reviewed.  Constitutional: She appears well-developed and well-nourished.   HENT:   Head: Normocephalic.   Eyes: EOM are normal.   Neck:   Normal range of motion.  Cardiovascular:  Normal rate, regular rhythm, normal heart sounds and intact distal pulses.           Pulmonary/Chest: Breath sounds normal. No respiratory distress.   Abdominal: Abdomen is soft. Bowel sounds are normal. There is no abdominal tenderness.   Genitourinary:    Genitourinary Comments: Chaperone: Eileen  Patient has some redness to the coccyx and a single hemorrhoid that is not thrombosed     Musculoskeletal:         General: Normal range of motion.      Cervical back: Normal range of motion.     Neurological: She is alert and oriented to person, place, and time. She has normal strength.   Skin: Skin is warm and dry.   Psychiatric: She has a normal mood and affect. Her behavior is normal. Judgment and thought content normal.         ED Course   Procedures  Labs Reviewed   COMPREHENSIVE METABOLIC PANEL - Abnormal       Result Value    Sodium 137      Potassium 4.2      Chloride 102      CO2 26      Glucose 94      Blood Urea Nitrogen 25.3 (*)     Creatinine 0.99      Calcium 8.4      Protein Total 6.6      Albumin 3.6      Globulin 3.0      Albumin/Globulin Ratio 1.2      Bilirubin Total 0.7      ALP 62      ALT 13      AST 21      eGFR 55      Anion Gap 9.0      BUN/Creatinine Ratio 26     URINALYSIS, REFLEX TO URINE CULTURE - Abnormal    Color, UA Yellow      Appearance, UA Turbid (*)     Specific Gravity, UA 1.013      pH, UA 6.0      Protein, UA Trace (*)     Glucose, UA Normal      Ketones, UA Negative      Blood, UA 1+ (*)     Bilirubin,  UA Negative      Urobilinogen, UA Normal      Nitrites, UA 2+ (*)     Leukocyte Esterase,  (*)     RBC, UA 0-5      WBC, UA >100 (*)     Bacteria, UA Moderate (*)     Squamous Epithelial Cells, UA Trace      Mucous, UA Trace (*)    CBC WITH DIFFERENTIAL - Abnormal    WBC 10.23      RBC 4.59      Hgb 14.7      Hct 43.4      MCV 94.6 (*)     MCH 32.0 (*)     MCHC 33.9      RDW 12.6      Platelet 170      MPV 9.2      Neut % 66.8      Lymph % 20.4      Mono % 10.5      Eos % 1.8      Basophil % 0.3      Lymph # 2.09      Neut # 6.84      Mono # 1.07      Eos # 0.18      Baso # 0.03      IG# 0.02      IG% 0.2      NRBC% 0.0     TROPONIN I - Normal    Troponin-I <0.010     CULTURE, URINE   CBC W/ AUTO DIFFERENTIAL    Narrative:     The following orders were created for panel order CBC auto differential.  Procedure                               Abnormality         Status                     ---------                               -----------         ------                     CBC with Differential[[id number]]       Abnormal            Final result                 Please view results for these tests on the individual orders.     EKG Readings: (Independently Interpreted)   Rhythm: Normal Sinus Rhythm. Heart Rate: 75. Ectopy: No Ectopy. Conduction: Bifasicular and 1st Degree AV Block. ST Segments: Normal ST Segments. T Waves: Normal. Clinical Impression: Normal Sinus Rhythm       Imaging Results              CT Head Without Contrast (Final result)  Result time 07/27/24 18:45:06      Final result by Daniel Kay MD (07/27/24 18:45:06)                   Impression:      1.  No acute intracranial findings identified    2.  Chronic microangiopathic ischemia and atrophy..      Electronically signed by: Daniel Kay  Date:    07/27/2024  Time:    18:45               Narrative:    EXAMINATION:  CT HEAD WITHOUT CONTRAST    CLINICAL HISTORY:  Mental status change, unknown cause;    TECHNIQUE:  Sequential axial images were  performed of the brain without contrast.    Dose product length of 1023 mGycm. Automated exposure control was utilized to minimize radiation dose.    COMPARISON:  None available.    FINDINGS:  Images are degraded by artifacts caused by patient motion.  There is no intracranial mass effect, midline shift, hydrocephalus or hemorrhage. There is no sulcal effacement or low attenuation changes to suggest recent large vessel territory infarction. Chronic appearing periventricular and subcortical white matter low attenuation changes are present and are consistent with moderate chronic microangiopathic ischemia. The ventricular system and sulcal markings prominence is consistent with atrophy. There is no acute extra axial fluid collection. Visualized paranasal sinuses are clear without mucosal thickening, polypoidal abnormality or air-fluid levels. Mastoid air cells aeration is optimal.                                       Medications - No data to display  Medical Decision Making  Historian:  Patient and nursing home record.  Patient is a 89-year-old female  that presents with anxiety today and hemorrhoid for greater than 1 week. Associated symptoms nothing. Surrounding information is patient states she got upset with the nursing home staff in a center to the ER. Exacerbated by nothing. Relieved by nothing. Patient treatment prior to arrival none. Risk factors include none. Other history pertaining to this complaint nothing.   Assessment:  See physical exam.  DD:  Hemorrhoid, anxiety    ED Course: History was obtained.  Physical was performed.  Patient was not confused nor agitated while in the ER.  She was able to answer every question appropriately.  She did have a UTI.  I will put her on cefdinir.  She does have a small hemorrhoid that the nursing home physician can consult surgery to see her. Medical or surgical consults:  None. Social determinants that affect healthcare:  None.       Amount and/or Complexity of Data  Reviewed  Independent Historian:      Details: Information on present illness from nursing home record.  We did call the nursing home and they stated she was confused and manic all day  External Data Reviewed: notes.     Details: Reviewed nursing home records  Labs: ordered.     Details: UTI  Radiology: ordered.     Details: CT head showed no acute findings    Risk  Prescription drug management.  Risk Details: Cefdinir                                      Clinical Impression:  Final diagnoses:  [R45.1] Agitated  [N39.0] Urinary tract infection without hematuria, site unspecified (Primary)          ED Disposition Condition    Discharge Stable          ED Prescriptions       Medication Sig Dispense Start Date End Date Auth. Provider    cefdinir (OMNICEF) 300 MG capsule Take 1 capsule (300 mg total) by mouth 2 (two) times daily. for 10 days 20 capsule 7/27/2024 8/6/2024 Peng Holden FNP          Follow-up Information       Follow up With Specialties Details Why Contact Info    Your Primary Care Provider  Call in 3 days ed follow up              Peng Holden FNP  07/27/24 2032

## 2024-07-28 LAB
OHS QRS DURATION: 116 MS
OHS QTC CALCULATION: 462 MS

## 2024-07-29 LAB — BACTERIA UR CULT: ABNORMAL

## 2024-08-29 NOTE — TELEPHONE ENCOUNTER
----- Message from Kalpana Ashley sent at 1/5/2023 11:02 AM CST -----  Regarding: cold  .Type:  Needs Medical Advice    Who Called: pt   Symptoms (please be specific): mucus    How long has patient had these symptoms:    Pharmacy name and phone #:    Would the patient rather a call back or a response via [facility]? Call back   Best Call Back Number: [phone]  Additional Information: pt states that she had a cold that she believed to be covid, she states that she was coughing up nasty looking mucus. She wants to know if the doctor thinks she needs to be seen        
Advised pt she would need to be checked out  Instructed pt to go to local walk in clinic   Pt verbalized understanding  
Spoke with pt  She stated her son is going get her at 2 to bring her to walk in clinic  
no

## 2024-10-09 ENCOUNTER — LAB REQUISITION (OUTPATIENT)
Dept: LAB | Facility: HOSPITAL | Age: 89
End: 2024-10-09
Payer: MEDICARE

## 2024-10-09 DIAGNOSIS — E03.8 OTHER SPECIFIED HYPOTHYROIDISM: ICD-10-CM

## 2024-10-09 DIAGNOSIS — E21.3 HYPERPARATHYROIDISM, UNSPECIFIED: ICD-10-CM

## 2024-10-09 LAB — TSH SERPL-ACNC: 2.54 UIU/ML (ref 0.35–4.94)

## 2024-10-09 PROCEDURE — 84443 ASSAY THYROID STIM HORMONE: CPT | Performed by: INTERNAL MEDICINE

## 2024-10-27 PROCEDURE — 87186 SC STD MICRODIL/AGAR DIL: CPT | Performed by: NURSE PRACTITIONER

## 2024-10-27 PROCEDURE — 87086 URINE CULTURE/COLONY COUNT: CPT | Performed by: NURSE PRACTITIONER

## 2024-10-27 PROCEDURE — 81003 URINALYSIS AUTO W/O SCOPE: CPT | Performed by: NURSE PRACTITIONER

## 2024-10-27 PROCEDURE — 81001 URINALYSIS AUTO W/SCOPE: CPT | Performed by: NURSE PRACTITIONER

## 2024-10-28 ENCOUNTER — LAB REQUISITION (OUTPATIENT)
Dept: LAB | Facility: HOSPITAL | Age: 89
End: 2024-10-28
Payer: MEDICARE

## 2024-10-28 DIAGNOSIS — R79.9 ABNORMAL FINDING OF BLOOD CHEMISTRY, UNSPECIFIED: ICD-10-CM

## 2024-10-28 LAB
ALBUMIN SERPL-MCNC: 3.5 G/DL (ref 3.4–4.8)
ALBUMIN/GLOB SERPL: 1.4 RATIO (ref 1.1–2)
ALP SERPL-CCNC: 79 UNIT/L (ref 40–150)
ALT SERPL-CCNC: 11 UNIT/L (ref 0–55)
ANION GAP SERPL CALC-SCNC: 10 MEQ/L
AST SERPL-CCNC: 15 UNIT/L (ref 5–34)
BACTERIA #/AREA URNS AUTO: ABNORMAL /HPF
BASOPHILS # BLD AUTO: 0.02 X10(3)/MCL
BASOPHILS NFR BLD AUTO: 0.2 %
BILIRUB SERPL-MCNC: 0.6 MG/DL
BILIRUB UR QL STRIP.AUTO: NEGATIVE
BUN SERPL-MCNC: 30.8 MG/DL (ref 9.8–20.1)
CALCIUM SERPL-MCNC: 8.7 MG/DL (ref 8.4–10.2)
CHLORIDE SERPL-SCNC: 107 MMOL/L (ref 98–107)
CLARITY UR: ABNORMAL
CO2 SERPL-SCNC: 24 MMOL/L (ref 23–31)
COLOR UR AUTO: YELLOW
CREAT SERPL-MCNC: 1.05 MG/DL (ref 0.55–1.02)
CREAT/UREA NIT SERPL: 29
EOSINOPHIL # BLD AUTO: 0.33 X10(3)/MCL (ref 0–0.9)
EOSINOPHIL NFR BLD AUTO: 3.7 %
ERYTHROCYTE [DISTWIDTH] IN BLOOD BY AUTOMATED COUNT: 12.2 % (ref 11.5–17)
GFR SERPLBLD CREATININE-BSD FMLA CKD-EPI: 51 ML/MIN/1.73/M2
GLOBULIN SER-MCNC: 2.5 GM/DL (ref 2.4–3.5)
GLUCOSE SERPL-MCNC: 93 MG/DL (ref 82–115)
GLUCOSE UR QL STRIP: NEGATIVE
HCT VFR BLD AUTO: 44.8 % (ref 37–47)
HGB BLD-MCNC: 14.6 G/DL (ref 12–16)
HGB UR QL STRIP: NEGATIVE
IMM GRANULOCYTES # BLD AUTO: 0.03 X10(3)/MCL (ref 0–0.04)
IMM GRANULOCYTES NFR BLD AUTO: 0.3 %
KETONES UR QL STRIP: NEGATIVE
LEUKOCYTE ESTERASE UR QL STRIP: ABNORMAL
LYMPHOCYTES # BLD AUTO: 1.97 X10(3)/MCL (ref 0.6–4.6)
LYMPHOCYTES NFR BLD AUTO: 21.9 %
MCH RBC QN AUTO: 31.1 PG (ref 27–31)
MCHC RBC AUTO-ENTMCNC: 32.6 G/DL (ref 33–36)
MCV RBC AUTO: 95.3 FL (ref 80–94)
MONOCYTES # BLD AUTO: 0.95 X10(3)/MCL (ref 0.1–1.3)
MONOCYTES NFR BLD AUTO: 10.6 %
NEUTROPHILS # BLD AUTO: 5.68 X10(3)/MCL (ref 2.1–9.2)
NEUTROPHILS NFR BLD AUTO: 63.3 %
NITRITE UR QL STRIP: POSITIVE
NRBC BLD AUTO-RTO: 0 %
PH UR STRIP: 6 [PH]
PLATELET # BLD AUTO: 226 X10(3)/MCL (ref 130–400)
PMV BLD AUTO: 9.3 FL (ref 7.4–10.4)
POTASSIUM SERPL-SCNC: 4.2 MMOL/L (ref 3.5–5.1)
PROT SERPL-MCNC: 6 GM/DL (ref 5.8–7.6)
PROT UR QL STRIP: NEGATIVE
RBC # BLD AUTO: 4.7 X10(6)/MCL (ref 4.2–5.4)
RBC #/AREA URNS AUTO: ABNORMAL /HPF
SODIUM SERPL-SCNC: 141 MMOL/L (ref 136–145)
SP GR UR STRIP.AUTO: 1.02 (ref 1–1.03)
SQUAMOUS #/AREA URNS AUTO: ABNORMAL /HPF
UROBILINOGEN UR STRIP-ACNC: 0.2
WBC # BLD AUTO: 8.98 X10(3)/MCL (ref 4.5–11.5)
WBC #/AREA URNS AUTO: >100 /HPF

## 2024-10-28 PROCEDURE — 85025 COMPLETE CBC W/AUTO DIFF WBC: CPT | Performed by: NURSE PRACTITIONER

## 2024-10-28 PROCEDURE — 80053 COMPREHEN METABOLIC PANEL: CPT | Performed by: NURSE PRACTITIONER

## 2024-10-30 LAB — BACTERIA UR CULT: ABNORMAL

## 2024-12-15 ENCOUNTER — LAB REQUISITION (OUTPATIENT)
Dept: LAB | Facility: HOSPITAL | Age: 89
End: 2024-12-15
Payer: MEDICARE

## 2024-12-15 DIAGNOSIS — N39.0 URINARY TRACT INFECTION, SITE NOT SPECIFIED: ICD-10-CM

## 2024-12-15 LAB
BACTERIA #/AREA URNS AUTO: ABNORMAL /HPF
BILIRUB UR QL STRIP.AUTO: NEGATIVE
CLARITY UR: ABNORMAL
COLOR UR AUTO: ABNORMAL
GLUCOSE UR QL STRIP: NEGATIVE
HGB UR QL STRIP: NEGATIVE
KETONES UR QL STRIP: NEGATIVE
LEUKOCYTE ESTERASE UR QL STRIP: NEGATIVE
NITRITE UR QL STRIP: POSITIVE
PH UR STRIP: 6 [PH]
PROT UR QL STRIP: NEGATIVE
RBC #/AREA URNS AUTO: ABNORMAL /HPF
SP GR UR STRIP.AUTO: 1.02 (ref 1–1.03)
SQUAMOUS #/AREA URNS AUTO: ABNORMAL /HPF
UROBILINOGEN UR STRIP-ACNC: 0.2
WBC #/AREA URNS AUTO: ABNORMAL /HPF

## 2024-12-15 PROCEDURE — 81001 URINALYSIS AUTO W/SCOPE: CPT | Performed by: NURSE PRACTITIONER

## 2024-12-31 ENCOUNTER — LAB REQUISITION (OUTPATIENT)
Dept: LAB | Facility: HOSPITAL | Age: 89
End: 2024-12-31
Payer: MEDICARE

## 2024-12-31 DIAGNOSIS — R05.9 COUGH, UNSPECIFIED: ICD-10-CM

## 2024-12-31 LAB
FLUAV AG UPPER RESP QL IA.RAPID: NOT DETECTED
FLUBV AG UPPER RESP QL IA.RAPID: NOT DETECTED

## 2024-12-31 PROCEDURE — 87502 INFLUENZA DNA AMP PROBE: CPT | Performed by: NURSE PRACTITIONER

## 2025-01-10 LAB
CHOLEST SERPL-MSCNC: 121 MG/DL (ref 0–200)
HDLC SERPL-MCNC: 35 MG/DL (ref 35–70)
LDLC SERPL CALC-MCNC: 71 MG/DL (ref 0–160)
TRIGL SERPL-MCNC: 128 MG/DL (ref 40–160)

## 2025-01-11 PROCEDURE — 87077 CULTURE AEROBIC IDENTIFY: CPT | Performed by: NURSE PRACTITIONER

## 2025-01-11 PROCEDURE — 81003 URINALYSIS AUTO W/O SCOPE: CPT | Performed by: NURSE PRACTITIONER

## 2025-01-12 ENCOUNTER — LAB REQUISITION (OUTPATIENT)
Dept: LAB | Facility: HOSPITAL | Age: OVER 89
End: 2025-01-12
Payer: MEDICARE

## 2025-01-12 DIAGNOSIS — N39.0 URINARY TRACT INFECTION, SITE NOT SPECIFIED: ICD-10-CM

## 2025-01-12 LAB
ANION GAP SERPL CALC-SCNC: 11 MEQ/L
BACTERIA #/AREA URNS AUTO: ABNORMAL /HPF
BASOPHILS # BLD AUTO: 0.05 X10(3)/MCL
BASOPHILS NFR BLD AUTO: 0.3 %
BILIRUB UR QL STRIP.AUTO: NEGATIVE
BUN SERPL-MCNC: 23.5 MG/DL (ref 9.8–20.1)
CALCIUM SERPL-MCNC: 8.5 MG/DL (ref 8.4–10.2)
CHLORIDE SERPL-SCNC: 103 MMOL/L (ref 98–111)
CLARITY UR: ABNORMAL
CO2 SERPL-SCNC: 25 MMOL/L (ref 23–31)
COLOR UR AUTO: ABNORMAL
CREAT SERPL-MCNC: 0.86 MG/DL (ref 0.55–1.02)
CREAT/UREA NIT SERPL: 27
EOSINOPHIL # BLD AUTO: 0.04 X10(3)/MCL (ref 0–0.9)
EOSINOPHIL NFR BLD AUTO: 0.3 %
ERYTHROCYTE [DISTWIDTH] IN BLOOD BY AUTOMATED COUNT: 12.8 % (ref 11.5–17)
GFR SERPLBLD CREATININE-BSD FMLA CKD-EPI: >60 ML/MIN/1.73/M2
GLUCOSE SERPL-MCNC: 91 MG/DL (ref 75–121)
GLUCOSE UR QL STRIP: NEGATIVE
HCT VFR BLD AUTO: 42.9 % (ref 37–47)
HGB BLD-MCNC: 14.5 G/DL (ref 12–16)
HGB UR QL STRIP: NEGATIVE
IMM GRANULOCYTES # BLD AUTO: 0.09 X10(3)/MCL (ref 0–0.04)
IMM GRANULOCYTES NFR BLD AUTO: 0.6 %
KETONES UR QL STRIP: NEGATIVE
LEUKOCYTE ESTERASE UR QL STRIP: ABNORMAL
LYMPHOCYTES # BLD AUTO: 2.11 X10(3)/MCL (ref 0.6–4.6)
LYMPHOCYTES NFR BLD AUTO: 14.6 %
MCH RBC QN AUTO: 30.9 PG (ref 27–31)
MCHC RBC AUTO-ENTMCNC: 33.8 G/DL (ref 33–36)
MCV RBC AUTO: 91.3 FL (ref 80–94)
MONOCYTES # BLD AUTO: 1.45 X10(3)/MCL (ref 0.1–1.3)
MONOCYTES NFR BLD AUTO: 10 %
NEUTROPHILS # BLD AUTO: 10.69 X10(3)/MCL (ref 2.1–9.2)
NEUTROPHILS NFR BLD AUTO: 74.2 %
NITRITE UR QL STRIP: POSITIVE
NRBC BLD AUTO-RTO: 0 %
PH UR STRIP: 6 [PH]
PLATELET # BLD AUTO: 392 X10(3)/MCL (ref 130–400)
PMV BLD AUTO: 8.8 FL (ref 7.4–10.4)
POTASSIUM SERPL-SCNC: 4.4 MMOL/L (ref 3.5–5.1)
PROT UR QL STRIP: NEGATIVE
RBC # BLD AUTO: 4.7 X10(6)/MCL (ref 4.2–5.4)
RBC #/AREA URNS AUTO: ABNORMAL /HPF
SODIUM SERPL-SCNC: 139 MMOL/L (ref 136–145)
SP GR UR STRIP.AUTO: 1.01 (ref 1–1.03)
SQUAMOUS #/AREA URNS AUTO: ABNORMAL /HPF
UROBILINOGEN UR STRIP-ACNC: 0.2
WBC # BLD AUTO: 14.43 X10(3)/MCL (ref 4.5–11.5)
WBC #/AREA URNS AUTO: ABNORMAL /HPF
WBC CLUMPS UR QL AUTO: ABNORMAL

## 2025-01-12 PROCEDURE — 85025 COMPLETE CBC W/AUTO DIFF WBC: CPT | Performed by: NURSE PRACTITIONER

## 2025-01-12 PROCEDURE — 80048 BASIC METABOLIC PNL TOTAL CA: CPT | Performed by: NURSE PRACTITIONER

## 2025-01-14 LAB — BACTERIA UR CULT: ABNORMAL

## 2025-01-24 ENCOUNTER — DOCUMENTATION ONLY (OUTPATIENT)
Dept: FAMILY MEDICINE | Facility: CLINIC | Age: OVER 89
End: 2025-01-24
Payer: MEDICARE

## 2025-02-12 ENCOUNTER — LAB REQUISITION (OUTPATIENT)
Dept: LAB | Facility: HOSPITAL | Age: OVER 89
End: 2025-02-12
Payer: MEDICARE

## 2025-02-12 DIAGNOSIS — I25.10 ATHEROSCLEROTIC HEART DISEASE OF NATIVE CORONARY ARTERY WITHOUT ANGINA PECTORIS: ICD-10-CM

## 2025-02-12 DIAGNOSIS — M62.50 MUSCLE WASTING AND ATROPHY, NOT ELSEWHERE CLASSIFIED, UNSPECIFIED SITE: ICD-10-CM

## 2025-02-12 DIAGNOSIS — E78.5 HYPERLIPIDEMIA, UNSPECIFIED: ICD-10-CM

## 2025-02-12 DIAGNOSIS — E56.9 VITAMIN DEFICIENCY, UNSPECIFIED: ICD-10-CM

## 2025-02-12 LAB
25(OH)D3+25(OH)D2 SERPL-MCNC: 27 NG/ML (ref 30–80)
ALBUMIN SERPL-MCNC: 3.6 G/DL (ref 3.4–4.8)
ALBUMIN/GLOB SERPL: 1.3 RATIO (ref 1.1–2)
ALP SERPL-CCNC: 74 UNIT/L (ref 40–150)
ALT SERPL-CCNC: 13 UNIT/L (ref 0–55)
ANION GAP SERPL CALC-SCNC: 11 MEQ/L
AST SERPL-CCNC: 16 UNIT/L (ref 5–34)
BASOPHILS # BLD AUTO: 0.03 X10(3)/MCL
BASOPHILS NFR BLD AUTO: 0.3 %
BILIRUB SERPL-MCNC: 0.7 MG/DL
BUN SERPL-MCNC: 20.8 MG/DL (ref 9.8–20.1)
CALCIUM SERPL-MCNC: 8.6 MG/DL (ref 8.4–10.2)
CHLORIDE SERPL-SCNC: 108 MMOL/L (ref 98–111)
CHOLEST SERPL-MCNC: 130 MG/DL
CHOLEST/HDLC SERPL: 3 {RATIO} (ref 0–5)
CO2 SERPL-SCNC: 24 MMOL/L (ref 23–31)
CREAT SERPL-MCNC: 0.93 MG/DL (ref 0.55–1.02)
CREAT/UREA NIT SERPL: 22
EOSINOPHIL # BLD AUTO: 0.28 X10(3)/MCL (ref 0–0.9)
EOSINOPHIL NFR BLD AUTO: 3 %
ERYTHROCYTE [DISTWIDTH] IN BLOOD BY AUTOMATED COUNT: 13.9 % (ref 11.5–17)
GFR SERPLBLD CREATININE-BSD FMLA CKD-EPI: 59 ML/MIN/1.73/M2
GLOBULIN SER-MCNC: 2.7 GM/DL (ref 2.4–3.5)
GLUCOSE SERPL-MCNC: 81 MG/DL (ref 75–121)
HCT VFR BLD AUTO: 45.4 % (ref 37–47)
HDLC SERPL-MCNC: 39 MG/DL (ref 35–60)
HGB BLD-MCNC: 14.8 G/DL (ref 12–16)
IMM GRANULOCYTES # BLD AUTO: 0.02 X10(3)/MCL (ref 0–0.04)
IMM GRANULOCYTES NFR BLD AUTO: 0.2 %
IRON SATN MFR SERPL: 36 % (ref 20–50)
IRON SERPL-MCNC: 99 UG/DL (ref 50–170)
LDLC SERPL CALC-MCNC: 69 MG/DL (ref 50–140)
LYMPHOCYTES # BLD AUTO: 2.06 X10(3)/MCL (ref 0.6–4.6)
LYMPHOCYTES NFR BLD AUTO: 22.3 %
MCH RBC QN AUTO: 31.5 PG (ref 27–31)
MCHC RBC AUTO-ENTMCNC: 32.6 G/DL (ref 33–36)
MCV RBC AUTO: 96.6 FL (ref 80–94)
MONOCYTES # BLD AUTO: 0.96 X10(3)/MCL (ref 0.1–1.3)
MONOCYTES NFR BLD AUTO: 10.4 %
NEUTROPHILS # BLD AUTO: 5.9 X10(3)/MCL (ref 2.1–9.2)
NEUTROPHILS NFR BLD AUTO: 63.8 %
NRBC BLD AUTO-RTO: 0 %
PLATELET # BLD AUTO: 217 X10(3)/MCL (ref 130–400)
PMV BLD AUTO: 9.7 FL (ref 7.4–10.4)
POTASSIUM SERPL-SCNC: 4.5 MMOL/L (ref 3.5–5.1)
PREALB SERPL-MCNC: 26.6 MG/DL (ref 14–37)
PROT SERPL-MCNC: 6.3 GM/DL (ref 5.8–7.6)
RBC # BLD AUTO: 4.7 X10(6)/MCL (ref 4.2–5.4)
SODIUM SERPL-SCNC: 143 MMOL/L (ref 136–145)
TIBC SERPL-MCNC: 174 UG/DL (ref 70–310)
TIBC SERPL-MCNC: 273 UG/DL (ref 250–450)
TRANSFERRIN SERPL-MCNC: 241 MG/DL
TRIGL SERPL-MCNC: 111 MG/DL (ref 37–140)
VLDLC SERPL CALC-MCNC: 22 MG/DL
WBC # BLD AUTO: 9.25 X10(3)/MCL (ref 4.5–11.5)

## 2025-02-12 PROCEDURE — 82306 VITAMIN D 25 HYDROXY: CPT | Performed by: INTERNAL MEDICINE

## 2025-02-12 PROCEDURE — 85025 COMPLETE CBC W/AUTO DIFF WBC: CPT | Performed by: INTERNAL MEDICINE

## 2025-02-12 PROCEDURE — 80061 LIPID PANEL: CPT | Performed by: INTERNAL MEDICINE

## 2025-02-12 PROCEDURE — 84134 ASSAY OF PREALBUMIN: CPT | Performed by: INTERNAL MEDICINE

## 2025-02-12 PROCEDURE — 80053 COMPREHEN METABOLIC PANEL: CPT | Performed by: INTERNAL MEDICINE

## 2025-02-12 PROCEDURE — 83540 ASSAY OF IRON: CPT | Performed by: INTERNAL MEDICINE

## 2025-03-05 PROCEDURE — 87186 SC STD MICRODIL/AGAR DIL: CPT | Performed by: NURSE PRACTITIONER

## 2025-03-05 PROCEDURE — 81003 URINALYSIS AUTO W/O SCOPE: CPT | Performed by: NURSE PRACTITIONER

## 2025-03-06 ENCOUNTER — LAB REQUISITION (OUTPATIENT)
Dept: LAB | Facility: HOSPITAL | Age: OVER 89
End: 2025-03-06
Payer: COMMERCIAL

## 2025-03-06 DIAGNOSIS — R41.82 ALTERED MENTAL STATUS, UNSPECIFIED: ICD-10-CM

## 2025-03-06 LAB
BACTERIA #/AREA URNS AUTO: ABNORMAL /HPF
BILIRUB UR QL STRIP.AUTO: NEGATIVE
CLARITY UR: ABNORMAL
COLOR UR AUTO: ABNORMAL
GLUCOSE UR QL STRIP: NEGATIVE
HGB UR QL STRIP: NEGATIVE
KETONES UR QL STRIP: NEGATIVE
LEUKOCYTE ESTERASE UR QL STRIP: ABNORMAL
NITRITE UR QL STRIP: POSITIVE
PH UR STRIP: 5.5 [PH]
PROT UR QL STRIP: NEGATIVE
RBC #/AREA URNS AUTO: ABNORMAL /HPF
SP GR UR STRIP.AUTO: 1.02 (ref 1–1.03)
SQUAMOUS #/AREA URNS AUTO: ABNORMAL /HPF
UROBILINOGEN UR STRIP-ACNC: 0.2
WBC #/AREA URNS AUTO: ABNORMAL /HPF

## 2025-03-08 LAB — BACTERIA UR CULT: ABNORMAL

## 2025-04-03 ENCOUNTER — LAB REQUISITION (OUTPATIENT)
Dept: LAB | Facility: HOSPITAL | Age: OVER 89
End: 2025-04-03
Payer: MEDICARE

## 2025-04-03 DIAGNOSIS — R41.0 DISORIENTATION, UNSPECIFIED: ICD-10-CM

## 2025-04-03 LAB
BACTERIA #/AREA URNS AUTO: ABNORMAL /HPF
BILIRUB UR QL STRIP.AUTO: NEGATIVE
CLARITY UR: ABNORMAL
COLOR UR AUTO: ABNORMAL
GLUCOSE UR QL STRIP: NEGATIVE
HGB UR QL STRIP: ABNORMAL
KETONES UR QL STRIP: NEGATIVE
LEUKOCYTE ESTERASE UR QL STRIP: ABNORMAL
NITRITE UR QL STRIP: POSITIVE
PH UR STRIP: 6 [PH]
PROT UR QL STRIP: NEGATIVE
RBC #/AREA URNS AUTO: ABNORMAL /HPF
SP GR UR STRIP.AUTO: 1.01 (ref 1–1.03)
SQUAMOUS #/AREA URNS AUTO: ABNORMAL /HPF
UROBILINOGEN UR STRIP-ACNC: 0.2
WBC #/AREA URNS AUTO: ABNORMAL /HPF

## 2025-04-03 PROCEDURE — 81015 MICROSCOPIC EXAM OF URINE: CPT | Performed by: NURSE PRACTITIONER

## 2025-04-03 PROCEDURE — 87077 CULTURE AEROBIC IDENTIFY: CPT | Mod: 91 | Performed by: NURSE PRACTITIONER

## 2025-04-04 ENCOUNTER — LAB REQUISITION (OUTPATIENT)
Dept: LAB | Facility: HOSPITAL | Age: OVER 89
End: 2025-04-04
Payer: MEDICARE

## 2025-04-04 DIAGNOSIS — N39.0 URINARY TRACT INFECTION, SITE NOT SPECIFIED: ICD-10-CM

## 2025-04-04 LAB
ALBUMIN SERPL-MCNC: 3.8 G/DL (ref 3.4–4.8)
ALBUMIN/GLOB SERPL: 1.4 RATIO (ref 1.1–2)
ALP SERPL-CCNC: 86 UNIT/L (ref 40–150)
ALT SERPL-CCNC: 8 UNIT/L (ref 0–55)
ANION GAP SERPL CALC-SCNC: 12 MEQ/L
AST SERPL-CCNC: 18 UNIT/L (ref 11–45)
BASOPHILS # BLD AUTO: 0.05 X10(3)/MCL
BASOPHILS NFR BLD AUTO: 0.5 %
BILIRUB SERPL-MCNC: 0.7 MG/DL
BUN SERPL-MCNC: 25.2 MG/DL (ref 9.8–20.1)
CALCIUM SERPL-MCNC: 8.5 MG/DL (ref 8.4–10.2)
CHLORIDE SERPL-SCNC: 105 MMOL/L (ref 98–111)
CO2 SERPL-SCNC: 26 MMOL/L (ref 23–31)
CREAT SERPL-MCNC: 0.95 MG/DL (ref 0.55–1.02)
CREAT/UREA NIT SERPL: 27
EOSINOPHIL # BLD AUTO: 0.51 X10(3)/MCL (ref 0–0.9)
EOSINOPHIL NFR BLD AUTO: 5.4 %
ERYTHROCYTE [DISTWIDTH] IN BLOOD BY AUTOMATED COUNT: 13.2 % (ref 11.5–17)
GFR SERPLBLD CREATININE-BSD FMLA CKD-EPI: 57 ML/MIN/1.73/M2
GLOBULIN SER-MCNC: 2.8 GM/DL (ref 2.4–3.5)
GLUCOSE SERPL-MCNC: 71 MG/DL (ref 75–121)
HCT VFR BLD AUTO: 47.3 % (ref 37–47)
HGB BLD-MCNC: 15.5 G/DL (ref 12–16)
IMM GRANULOCYTES # BLD AUTO: 0.02 X10(3)/MCL (ref 0–0.04)
IMM GRANULOCYTES NFR BLD AUTO: 0.2 %
LYMPHOCYTES # BLD AUTO: 2.02 X10(3)/MCL (ref 0.6–4.6)
LYMPHOCYTES NFR BLD AUTO: 21.4 %
MCH RBC QN AUTO: 31.8 PG (ref 27–31)
MCHC RBC AUTO-ENTMCNC: 32.8 G/DL (ref 33–36)
MCV RBC AUTO: 96.9 FL (ref 80–94)
MONOCYTES # BLD AUTO: 1.17 X10(3)/MCL (ref 0.1–1.3)
MONOCYTES NFR BLD AUTO: 12.4 %
NEUTROPHILS # BLD AUTO: 5.67 X10(3)/MCL (ref 2.1–9.2)
NEUTROPHILS NFR BLD AUTO: 60.1 %
NRBC BLD AUTO-RTO: 0 %
PLATELET # BLD AUTO: 230 X10(3)/MCL (ref 130–400)
PMV BLD AUTO: 9.9 FL (ref 7.4–10.4)
POTASSIUM SERPL-SCNC: 4 MMOL/L (ref 3.5–5.1)
PROT SERPL-MCNC: 6.6 GM/DL (ref 5.8–7.6)
RBC # BLD AUTO: 4.88 X10(6)/MCL (ref 4.2–5.4)
SODIUM SERPL-SCNC: 143 MMOL/L (ref 136–145)
WBC # BLD AUTO: 9.44 X10(3)/MCL (ref 4.5–11.5)

## 2025-04-04 PROCEDURE — 80053 COMPREHEN METABOLIC PANEL: CPT | Performed by: INTERNAL MEDICINE

## 2025-04-04 PROCEDURE — 85025 COMPLETE CBC W/AUTO DIFF WBC: CPT | Performed by: INTERNAL MEDICINE

## 2025-04-07 LAB
BACTERIA UR CULT: ABNORMAL
BACTERIA UR CULT: ABNORMAL

## 2025-04-08 ENCOUNTER — LAB REQUISITION (OUTPATIENT)
Dept: LAB | Facility: HOSPITAL | Age: OVER 89
End: 2025-04-08
Payer: MEDICARE

## 2025-04-08 DIAGNOSIS — E03.9 HYPOTHYROIDISM, UNSPECIFIED: ICD-10-CM

## 2025-04-08 DIAGNOSIS — M62.50 MUSCLE WASTING AND ATROPHY, NOT ELSEWHERE CLASSIFIED, UNSPECIFIED SITE: ICD-10-CM

## 2025-04-08 DIAGNOSIS — N39.0 URINARY TRACT INFECTION, SITE NOT SPECIFIED: ICD-10-CM

## 2025-04-08 LAB
ALBUMIN SERPL-MCNC: 3.7 G/DL (ref 3.4–4.8)
ALBUMIN/GLOB SERPL: 1.1 RATIO (ref 1.1–2)
ALP SERPL-CCNC: 73 UNIT/L (ref 40–150)
ALT SERPL-CCNC: 10 UNIT/L (ref 0–55)
ANION GAP SERPL CALC-SCNC: 10 MEQ/L
AST SERPL-CCNC: 30 UNIT/L (ref 11–45)
BASOPHILS # BLD AUTO: 0.03 X10(3)/MCL
BASOPHILS NFR BLD AUTO: 0.4 %
BILIRUB SERPL-MCNC: 0.5 MG/DL
BUN SERPL-MCNC: 22.6 MG/DL (ref 9.8–20.1)
CALCIUM SERPL-MCNC: 8.4 MG/DL (ref 8.4–10.2)
CHLORIDE SERPL-SCNC: 105 MMOL/L (ref 98–111)
CO2 SERPL-SCNC: 24 MMOL/L (ref 23–31)
CREAT SERPL-MCNC: 0.92 MG/DL (ref 0.55–1.02)
CREAT/UREA NIT SERPL: 25
EOSINOPHIL # BLD AUTO: 0.47 X10(3)/MCL (ref 0–0.9)
EOSINOPHIL NFR BLD AUTO: 6.3 %
ERYTHROCYTE [DISTWIDTH] IN BLOOD BY AUTOMATED COUNT: 13 % (ref 11.5–17)
GFR SERPLBLD CREATININE-BSD FMLA CKD-EPI: 59 ML/MIN/1.73/M2
GLOBULIN SER-MCNC: 3.4 GM/DL (ref 2.4–3.5)
GLUCOSE SERPL-MCNC: 87 MG/DL (ref 75–121)
HCT VFR BLD AUTO: 45.1 % (ref 37–47)
HGB BLD-MCNC: 14.8 G/DL (ref 12–16)
IMM GRANULOCYTES # BLD AUTO: 0.03 X10(3)/MCL (ref 0–0.04)
IMM GRANULOCYTES NFR BLD AUTO: 0.4 %
LYMPHOCYTES # BLD AUTO: 1.89 X10(3)/MCL (ref 0.6–4.6)
LYMPHOCYTES NFR BLD AUTO: 25.5 %
MCH RBC QN AUTO: 31.7 PG (ref 27–31)
MCHC RBC AUTO-ENTMCNC: 32.8 G/DL (ref 33–36)
MCV RBC AUTO: 96.6 FL (ref 80–94)
MONOCYTES # BLD AUTO: 0.85 X10(3)/MCL (ref 0.1–1.3)
MONOCYTES NFR BLD AUTO: 11.5 %
NEUTROPHILS # BLD AUTO: 4.15 X10(3)/MCL (ref 2.1–9.2)
NEUTROPHILS NFR BLD AUTO: 55.9 %
NRBC BLD AUTO-RTO: 0 %
PLATELET # BLD AUTO: 191 X10(3)/MCL (ref 130–400)
PMV BLD AUTO: 9.3 FL (ref 7.4–10.4)
POTASSIUM SERPL-SCNC: 5.1 MMOL/L (ref 3.5–5.1)
PROT SERPL-MCNC: 7.1 GM/DL (ref 5.8–7.6)
RBC # BLD AUTO: 4.67 X10(6)/MCL (ref 4.2–5.4)
SODIUM SERPL-SCNC: 139 MMOL/L (ref 136–145)
T PALLIDUM AB SER QL: NONREACTIVE
TSH SERPL-ACNC: 1.46 UIU/ML (ref 0.35–4.94)
WBC # BLD AUTO: 7.42 X10(3)/MCL (ref 4.5–11.5)

## 2025-04-08 PROCEDURE — 80053 COMPREHEN METABOLIC PANEL: CPT | Performed by: INTERNAL MEDICINE

## 2025-04-08 PROCEDURE — 85025 COMPLETE CBC W/AUTO DIFF WBC: CPT | Performed by: INTERNAL MEDICINE

## 2025-04-08 PROCEDURE — 86780 TREPONEMA PALLIDUM: CPT | Performed by: INTERNAL MEDICINE

## 2025-04-08 PROCEDURE — 84443 ASSAY THYROID STIM HORMONE: CPT | Performed by: INTERNAL MEDICINE

## 2025-04-09 ENCOUNTER — LAB REQUISITION (OUTPATIENT)
Dept: LAB | Facility: HOSPITAL | Age: OVER 89
End: 2025-04-09
Payer: MEDICARE

## 2025-04-09 DIAGNOSIS — R79.9 ABNORMAL FINDING OF BLOOD CHEMISTRY, UNSPECIFIED: ICD-10-CM

## 2025-04-09 LAB — T PALLIDUM AB SER QL: NONREACTIVE

## 2025-04-09 PROCEDURE — 86780 TREPONEMA PALLIDUM: CPT | Performed by: NURSE PRACTITIONER

## 2025-05-16 DIAGNOSIS — N30.20 BLADDER INFECTION, CHRONIC: Primary | ICD-10-CM

## 2025-06-19 ENCOUNTER — HOSPITAL ENCOUNTER (OUTPATIENT)
Dept: RADIOLOGY | Facility: HOSPITAL | Age: OVER 89
Discharge: HOME | End: 2025-06-19
Attending: NURSE PRACTITIONER
Payer: MEDICARE

## 2025-06-19 DIAGNOSIS — N30.20 BLADDER INFECTION, CHRONIC: ICD-10-CM

## 2025-06-19 PROCEDURE — 74178 CT ABD&PLV WO CNTR FLWD CNTR: CPT | Mod: TC

## 2025-06-19 PROCEDURE — 25500020 PHARM REV CODE 255: Performed by: NURSE PRACTITIONER

## 2025-06-19 RX ADMIN — IOHEXOL 75 ML: 350 INJECTION, SOLUTION INTRAVENOUS at 04:06

## 2025-06-27 ENCOUNTER — LAB REQUISITION (OUTPATIENT)
Dept: LAB | Facility: HOSPITAL | Age: OVER 89
End: 2025-06-27
Payer: MEDICARE

## 2025-06-27 DIAGNOSIS — I25.10 ATHEROSCLEROTIC HEART DISEASE OF NATIVE CORONARY ARTERY WITHOUT ANGINA PECTORIS: ICD-10-CM

## 2025-06-27 LAB
ANION GAP SERPL CALC-SCNC: 8 MEQ/L
BACTERIA #/AREA URNS AUTO: ABNORMAL /HPF
BASOPHILS # BLD AUTO: 0.04 X10(3)/MCL
BASOPHILS NFR BLD AUTO: 0.4 %
BILIRUB UR QL STRIP.AUTO: NEGATIVE
BUN SERPL-MCNC: 22.6 MG/DL (ref 9.8–20.1)
CALCIUM SERPL-MCNC: 8.3 MG/DL (ref 8.4–10.2)
CHLORIDE SERPL-SCNC: 107 MMOL/L (ref 98–111)
CLARITY UR: ABNORMAL
CO2 SERPL-SCNC: 27 MMOL/L (ref 23–31)
COLOR UR AUTO: YELLOW
CREAT SERPL-MCNC: 1.01 MG/DL (ref 0.55–1.02)
CREAT/UREA NIT SERPL: 22
EOSINOPHIL # BLD AUTO: 0.29 X10(3)/MCL (ref 0–0.9)
EOSINOPHIL NFR BLD AUTO: 2.8 %
ERYTHROCYTE [DISTWIDTH] IN BLOOD BY AUTOMATED COUNT: 12.7 % (ref 11.5–17)
GFR SERPLBLD CREATININE-BSD FMLA CKD-EPI: 53 ML/MIN/1.73/M2
GLUCOSE SERPL-MCNC: 82 MG/DL (ref 75–121)
GLUCOSE UR QL STRIP: NEGATIVE
HCT VFR BLD AUTO: 44.5 % (ref 37–47)
HGB BLD-MCNC: 14.9 G/DL (ref 12–16)
HGB UR QL STRIP: ABNORMAL
IMM GRANULOCYTES # BLD AUTO: 0.02 X10(3)/MCL (ref 0–0.04)
IMM GRANULOCYTES NFR BLD AUTO: 0.2 %
KETONES UR QL STRIP: NEGATIVE
LEUKOCYTE ESTERASE UR QL STRIP: ABNORMAL
LYMPHOCYTES # BLD AUTO: 2.1 X10(3)/MCL (ref 0.6–4.6)
LYMPHOCYTES NFR BLD AUTO: 20.2 %
MCH RBC QN AUTO: 31.8 PG (ref 27–31)
MCHC RBC AUTO-ENTMCNC: 33.5 G/DL (ref 33–36)
MCV RBC AUTO: 94.9 FL (ref 80–94)
MONOCYTES # BLD AUTO: 0.98 X10(3)/MCL (ref 0.1–1.3)
MONOCYTES NFR BLD AUTO: 9.4 %
NEUTROPHILS # BLD AUTO: 6.98 X10(3)/MCL (ref 2.1–9.2)
NEUTROPHILS NFR BLD AUTO: 67 %
NITRITE UR QL STRIP: POSITIVE
NRBC BLD AUTO-RTO: 0 %
PH UR STRIP: 8.5 [PH]
PLATELET # BLD AUTO: 219 X10(3)/MCL (ref 130–400)
PMV BLD AUTO: 9.7 FL (ref 7.4–10.4)
POTASSIUM SERPL-SCNC: 4.6 MMOL/L (ref 3.5–5.1)
PROT UR QL STRIP: 30
RBC # BLD AUTO: 4.69 X10(6)/MCL (ref 4.2–5.4)
RBC #/AREA URNS AUTO: ABNORMAL /HPF
SODIUM SERPL-SCNC: 142 MMOL/L (ref 136–145)
SP GR UR STRIP.AUTO: 1.01 (ref 1–1.03)
SQUAMOUS #/AREA URNS AUTO: ABNORMAL /HPF
UROBILINOGEN UR STRIP-ACNC: 0.2
WBC # BLD AUTO: 10.41 X10(3)/MCL (ref 4.5–11.5)
WBC #/AREA URNS AUTO: ABNORMAL /HPF

## 2025-06-27 PROCEDURE — 85025 COMPLETE CBC W/AUTO DIFF WBC: CPT | Performed by: INTERNAL MEDICINE

## 2025-06-27 PROCEDURE — 81015 MICROSCOPIC EXAM OF URINE: CPT | Performed by: INTERNAL MEDICINE

## 2025-06-27 PROCEDURE — 81001 URINALYSIS AUTO W/SCOPE: CPT | Performed by: INTERNAL MEDICINE

## 2025-06-27 PROCEDURE — 87186 SC STD MICRODIL/AGAR DIL: CPT | Performed by: INTERNAL MEDICINE

## 2025-06-27 PROCEDURE — 80048 BASIC METABOLIC PNL TOTAL CA: CPT | Performed by: INTERNAL MEDICINE

## 2025-06-29 LAB — BACTERIA UR CULT: ABNORMAL

## 2025-07-15 NOTE — TELEPHONE ENCOUNTER
----- Message from Lili Pineda sent at 7/24/2023 10:10 AM CDT -----  Regarding: RX Refill Request  Type:  RX Refill Request    Who Called: pt  Refill or New Rx:  RX Name and Strength:levothyroxine (SYNTHROID) 75 MCG tablet  How is the patient currently taking it? (ex. 1XDay): 1x  Is this a 30 day or 90 day RX: 90  Preferred Pharmacy with phone number: [id number] [place] [facility] [address] [facility] [address]  Local or Mail Order:  Ordering Provider: Dr. Soto  Would the patient rather a call back or a response via [facility]?   Best Call Back Number:[phone]  Additional Information: pt called to have medication refilled         Reduced Fetal Movement

## 2025-07-18 ENCOUNTER — LAB REQUISITION (OUTPATIENT)
Dept: LAB | Facility: HOSPITAL | Age: OVER 89
End: 2025-07-18
Payer: MEDICARE

## 2025-07-18 DIAGNOSIS — I10 ESSENTIAL (PRIMARY) HYPERTENSION: ICD-10-CM

## 2025-07-18 LAB
BACTERIA #/AREA URNS AUTO: ABNORMAL /HPF
BILIRUB UR QL STRIP.AUTO: NEGATIVE
CLARITY UR: ABNORMAL
COLOR UR AUTO: ABNORMAL
GLUCOSE UR QL STRIP: NEGATIVE
HGB UR QL STRIP: NEGATIVE
KETONES UR QL STRIP: NEGATIVE
LEUKOCYTE ESTERASE UR QL STRIP: ABNORMAL
NITRITE UR QL STRIP: NEGATIVE
PH UR STRIP: 6 [PH]
PROT UR QL STRIP: NEGATIVE
RBC #/AREA URNS AUTO: ABNORMAL /HPF
SP GR UR STRIP.AUTO: <=1.005 (ref 1–1.03)
SQUAMOUS #/AREA URNS AUTO: ABNORMAL /HPF
UROBILINOGEN UR STRIP-ACNC: 0.2
WBC #/AREA URNS AUTO: ABNORMAL /HPF

## 2025-07-18 PROCEDURE — 81003 URINALYSIS AUTO W/O SCOPE: CPT | Performed by: NURSE PRACTITIONER

## 2025-07-18 PROCEDURE — 87186 SC STD MICRODIL/AGAR DIL: CPT | Mod: 91 | Performed by: NURSE PRACTITIONER

## 2025-07-21 LAB
BACTERIA UR CULT: ABNORMAL
BACTERIA UR CULT: ABNORMAL

## 2025-07-31 DIAGNOSIS — R41.9 COGNITIVE COMPLAINTS: Primary | ICD-10-CM

## 2025-08-12 ENCOUNTER — LAB REQUISITION (OUTPATIENT)
Dept: LAB | Facility: HOSPITAL | Age: OVER 89
End: 2025-08-12
Payer: MEDICARE

## 2025-08-12 DIAGNOSIS — I10 ESSENTIAL (PRIMARY) HYPERTENSION: ICD-10-CM

## 2025-08-12 LAB
25(OH)D3+25(OH)D2 SERPL-MCNC: 28 NG/ML (ref 30–80)
ALBUMIN SERPL-MCNC: 3.1 G/DL (ref 3.4–4.8)
ALBUMIN/GLOB SERPL: 1.3 RATIO (ref 1.1–2)
ALP SERPL-CCNC: 67 UNIT/L (ref 40–150)
ALT SERPL-CCNC: 8 UNIT/L (ref 0–55)
ANION GAP SERPL CALC-SCNC: 9 MEQ/L
AST SERPL-CCNC: 12 UNIT/L (ref 11–45)
BASOPHILS # BLD AUTO: 0.04 X10(3)/MCL
BASOPHILS NFR BLD AUTO: 0.6 %
BILIRUB SERPL-MCNC: 0.7 MG/DL
BUN SERPL-MCNC: 19.5 MG/DL (ref 9.8–20.1)
CALCIUM SERPL-MCNC: 7.9 MG/DL (ref 8.4–10.2)
CHLORIDE SERPL-SCNC: 106 MMOL/L (ref 98–111)
CHOLEST SERPL-MCNC: 102 MG/DL
CHOLEST/HDLC SERPL: 4 {RATIO} (ref 0–5)
CO2 SERPL-SCNC: 26 MMOL/L (ref 23–31)
CREAT SERPL-MCNC: 0.98 MG/DL (ref 0.55–1.02)
CREAT/UREA NIT SERPL: 20
EOSINOPHIL # BLD AUTO: 0.37 X10(3)/MCL (ref 0–0.9)
EOSINOPHIL NFR BLD AUTO: 5.1 %
ERYTHROCYTE [DISTWIDTH] IN BLOOD BY AUTOMATED COUNT: 12.8 % (ref 11.5–17)
GFR SERPLBLD CREATININE-BSD FMLA CKD-EPI: 55 ML/MIN/1.73/M2
GLOBULIN SER-MCNC: 2.4 GM/DL (ref 2.4–3.5)
GLUCOSE SERPL-MCNC: 84 MG/DL (ref 75–121)
HCT VFR BLD AUTO: 40.2 % (ref 37–47)
HDLC SERPL-MCNC: 27 MG/DL (ref 35–60)
HGB BLD-MCNC: 13.1 G/DL (ref 12–16)
IMM GRANULOCYTES # BLD AUTO: 0.02 X10(3)/MCL (ref 0–0.04)
IMM GRANULOCYTES NFR BLD AUTO: 0.3 %
IRON SATN MFR SERPL: 30 % (ref 20–50)
IRON SERPL-MCNC: 65 UG/DL (ref 50–170)
LDLC SERPL CALC-MCNC: 53 MG/DL (ref 50–140)
LYMPHOCYTES # BLD AUTO: 1.79 X10(3)/MCL (ref 0.6–4.6)
LYMPHOCYTES NFR BLD AUTO: 24.7 %
MCH RBC QN AUTO: 31.5 PG (ref 27–31)
MCHC RBC AUTO-ENTMCNC: 32.6 G/DL (ref 33–36)
MCV RBC AUTO: 96.6 FL (ref 80–94)
MONOCYTES # BLD AUTO: 0.71 X10(3)/MCL (ref 0.1–1.3)
MONOCYTES NFR BLD AUTO: 9.8 %
NEUTROPHILS # BLD AUTO: 4.33 X10(3)/MCL (ref 2.1–9.2)
NEUTROPHILS NFR BLD AUTO: 59.5 %
NRBC BLD AUTO-RTO: 0 %
PLATELET # BLD AUTO: 218 X10(3)/MCL (ref 130–400)
PMV BLD AUTO: 9.8 FL (ref 7.4–10.4)
POTASSIUM SERPL-SCNC: 4.1 MMOL/L (ref 3.5–5.1)
PREALB SERPL-MCNC: 16.6 MG/DL (ref 14–37)
PROT SERPL-MCNC: 5.5 GM/DL (ref 5.8–7.6)
RBC # BLD AUTO: 4.16 X10(6)/MCL (ref 4.2–5.4)
SODIUM SERPL-SCNC: 141 MMOL/L (ref 136–145)
TIBC SERPL-MCNC: 153 UG/DL (ref 70–310)
TIBC SERPL-MCNC: 218 UG/DL (ref 250–450)
TRANSFERRIN SERPL-MCNC: 189 MG/DL
TRIGL SERPL-MCNC: 109 MG/DL (ref 37–140)
VLDLC SERPL CALC-MCNC: 22 MG/DL
WBC # BLD AUTO: 7.26 X10(3)/MCL (ref 4.5–11.5)

## 2025-08-12 PROCEDURE — 80061 LIPID PANEL: CPT | Performed by: INTERNAL MEDICINE

## 2025-08-12 PROCEDURE — 85025 COMPLETE CBC W/AUTO DIFF WBC: CPT | Performed by: INTERNAL MEDICINE

## 2025-08-12 PROCEDURE — 80053 COMPREHEN METABOLIC PANEL: CPT | Performed by: INTERNAL MEDICINE

## 2025-08-12 PROCEDURE — 84134 ASSAY OF PREALBUMIN: CPT | Performed by: INTERNAL MEDICINE

## 2025-08-12 PROCEDURE — 83550 IRON BINDING TEST: CPT | Performed by: INTERNAL MEDICINE

## 2025-08-12 PROCEDURE — 82306 VITAMIN D 25 HYDROXY: CPT | Performed by: INTERNAL MEDICINE

## 2025-08-13 ENCOUNTER — TELEPHONE (OUTPATIENT)
Dept: FAMILY MEDICINE | Facility: CLINIC | Age: OVER 89
End: 2025-08-13
Payer: MEDICARE

## 2025-08-20 ENCOUNTER — TELEPHONE (OUTPATIENT)
Dept: FAMILY MEDICINE | Facility: CLINIC | Age: OVER 89
End: 2025-08-20

## 2025-08-20 ENCOUNTER — OFFICE VISIT (OUTPATIENT)
Dept: FAMILY MEDICINE | Facility: CLINIC | Age: OVER 89
End: 2025-08-20
Payer: MEDICARE

## 2025-08-20 ENCOUNTER — CLINICAL SUPPORT (OUTPATIENT)
Dept: RESPIRATORY THERAPY | Facility: HOSPITAL | Age: OVER 89
End: 2025-08-20
Attending: INTERNAL MEDICINE
Payer: MEDICARE

## 2025-08-20 VITALS
HEART RATE: 67 BPM | BODY MASS INDEX: 31.4 KG/M2 | OXYGEN SATURATION: 95 % | DIASTOLIC BLOOD PRESSURE: 76 MMHG | TEMPERATURE: 97 F | SYSTOLIC BLOOD PRESSURE: 118 MMHG | WEIGHT: 166.31 LBS | RESPIRATION RATE: 16 BRPM | HEIGHT: 61 IN

## 2025-08-20 DIAGNOSIS — R26.89 BALANCE DISORDER: ICD-10-CM

## 2025-08-20 DIAGNOSIS — R41.3 MEMORY LOSS: Primary | ICD-10-CM

## 2025-08-20 DIAGNOSIS — R41.3 OTHER AMNESIA: ICD-10-CM

## 2025-08-20 DIAGNOSIS — N18.31 CHRONIC KIDNEY DISEASE, STAGE 3A: ICD-10-CM

## 2025-08-20 LAB
OHS QRS DURATION: 114 MS
OHS QTC CALCULATION: 424 MS

## 2025-08-20 PROCEDURE — 93010 ELECTROCARDIOGRAM REPORT: CPT | Mod: ,,, | Performed by: INTERNAL MEDICINE

## 2025-08-20 PROCEDURE — 93005 ELECTROCARDIOGRAM TRACING: CPT

## 2025-08-20 PROCEDURE — 99214 OFFICE O/P EST MOD 30 MIN: CPT | Mod: ,,, | Performed by: INTERNAL MEDICINE

## 2025-08-20 RX ORDER — QUETIAPINE FUMARATE 25 MG/1
25 TABLET, FILM COATED ORAL NIGHTLY
Qty: 30 TABLET | Refills: 11 | Status: SHIPPED | OUTPATIENT
Start: 2025-08-20 | End: 2025-08-22 | Stop reason: SDUPTHER

## 2025-08-20 RX ORDER — SOLIFENACIN SUCCINATE 10 MG/1
10 TABLET, FILM COATED ORAL
COMMUNITY
Start: 2025-07-31

## 2025-08-20 RX ORDER — NITROFURANTOIN 25; 75 MG/1; MG/1
100 CAPSULE ORAL
COMMUNITY
Start: 2025-07-28

## 2025-08-20 RX ORDER — TRAMADOL HYDROCHLORIDE 50 MG/1
TABLET, FILM COATED ORAL
COMMUNITY
Start: 2025-04-10

## 2025-08-20 RX ORDER — MULTIVITAMIN
1 TABLET ORAL EVERY OTHER DAY
Qty: 15 TABLET | Refills: 5 | Status: SHIPPED | OUTPATIENT
Start: 2025-08-20 | End: 2025-08-22 | Stop reason: SDUPTHER

## 2025-08-22 ENCOUNTER — TELEPHONE (OUTPATIENT)
Dept: FAMILY MEDICINE | Facility: CLINIC | Age: OVER 89
End: 2025-08-22
Payer: MEDICARE

## 2025-08-22 DIAGNOSIS — G31.84 MCI (MILD COGNITIVE IMPAIRMENT) WITH MEMORY LOSS: ICD-10-CM

## 2025-08-22 DIAGNOSIS — F05 SUNDOWNING: ICD-10-CM

## 2025-08-22 DIAGNOSIS — G31.84 MCI (MILD COGNITIVE IMPAIRMENT) WITH MEMORY LOSS: Primary | ICD-10-CM

## 2025-08-22 RX ORDER — QUETIAPINE FUMARATE 25 MG/1
25 TABLET, FILM COATED ORAL NIGHTLY
Qty: 30 TABLET | Refills: 11 | Status: SHIPPED | OUTPATIENT
Start: 2025-08-22 | End: 2026-08-22

## 2025-08-22 RX ORDER — MULTIVITAMIN
1 TABLET ORAL EVERY OTHER DAY
Qty: 15 TABLET | Refills: 5 | Status: SHIPPED | OUTPATIENT
Start: 2025-08-22

## 2025-08-22 RX ORDER — QUETIAPINE FUMARATE 25 MG/1
25 TABLET, FILM COATED ORAL NIGHTLY
Qty: 30 TABLET | Refills: 11 | Status: SHIPPED | OUTPATIENT
Start: 2025-08-22 | End: 2025-08-22 | Stop reason: SDUPTHER

## 2025-08-27 ENCOUNTER — LAB REQUISITION (OUTPATIENT)
Dept: LAB | Facility: HOSPITAL | Age: OVER 89
End: 2025-08-27
Payer: MEDICARE

## 2025-08-27 ENCOUNTER — TELEPHONE (OUTPATIENT)
Dept: FAMILY MEDICINE | Facility: CLINIC | Age: OVER 89
End: 2025-08-27
Payer: MEDICARE

## 2025-08-27 DIAGNOSIS — E56.9 VITAMIN DEFICIENCY, UNSPECIFIED: ICD-10-CM

## 2025-08-27 LAB
ALBUMIN SERPL-MCNC: 3.2 G/DL (ref 3.4–4.8)
ALBUMIN/GLOB SERPL: 1.1 RATIO (ref 1.1–2)
ALP SERPL-CCNC: 65 UNIT/L (ref 40–150)
ALT SERPL-CCNC: 10 UNIT/L (ref 0–55)
ANION GAP SERPL CALC-SCNC: 10 MEQ/L
AST SERPL-CCNC: 14 UNIT/L (ref 11–45)
BASOPHILS # BLD AUTO: 0.04 X10(3)/MCL
BASOPHILS NFR BLD AUTO: 0.5 %
BILIRUB SERPL-MCNC: 0.7 MG/DL
BUN SERPL-MCNC: 24.5 MG/DL (ref 9.8–20.1)
CALCIUM SERPL-MCNC: 8.6 MG/DL (ref 8.4–10.2)
CHLORIDE SERPL-SCNC: 106 MMOL/L (ref 98–111)
CO2 SERPL-SCNC: 26 MMOL/L (ref 23–31)
CREAT SERPL-MCNC: 0.87 MG/DL (ref 0.55–1.02)
CREAT/UREA NIT SERPL: 28
EOSINOPHIL # BLD AUTO: 0.33 X10(3)/MCL (ref 0–0.9)
EOSINOPHIL NFR BLD AUTO: 3.8 %
ERYTHROCYTE [DISTWIDTH] IN BLOOD BY AUTOMATED COUNT: 12.7 % (ref 11.5–17)
GFR SERPLBLD CREATININE-BSD FMLA CKD-EPI: >60 ML/MIN/1.73/M2
GLOBULIN SER-MCNC: 2.8 GM/DL (ref 2.4–3.5)
GLUCOSE SERPL-MCNC: 85 MG/DL (ref 75–121)
HCT VFR BLD AUTO: 42.6 % (ref 37–47)
HGB BLD-MCNC: 13.9 G/DL (ref 12–16)
IMM GRANULOCYTES # BLD AUTO: 0.02 X10(3)/MCL (ref 0–0.04)
IMM GRANULOCYTES NFR BLD AUTO: 0.2 %
LYMPHOCYTES # BLD AUTO: 1.87 X10(3)/MCL (ref 0.6–4.6)
LYMPHOCYTES NFR BLD AUTO: 21.4 %
MCH RBC QN AUTO: 31.7 PG (ref 27–31)
MCHC RBC AUTO-ENTMCNC: 32.6 G/DL (ref 33–36)
MCV RBC AUTO: 97 FL (ref 80–94)
MONOCYTES # BLD AUTO: 0.97 X10(3)/MCL (ref 0.1–1.3)
MONOCYTES NFR BLD AUTO: 11.1 %
NEUTROPHILS # BLD AUTO: 5.52 X10(3)/MCL (ref 2.1–9.2)
NEUTROPHILS NFR BLD AUTO: 63 %
NRBC BLD AUTO-RTO: 0 %
PLATELET # BLD AUTO: 192 X10(3)/MCL (ref 130–400)
PMV BLD AUTO: 9.8 FL (ref 7.4–10.4)
POTASSIUM SERPL-SCNC: 4.2 MMOL/L (ref 3.5–5.1)
PREALB SERPL-MCNC: 19.5 MG/DL (ref 14–37)
PROT SERPL-MCNC: 6 GM/DL (ref 5.8–7.6)
RBC # BLD AUTO: 4.39 X10(6)/MCL (ref 4.2–5.4)
SODIUM SERPL-SCNC: 142 MMOL/L (ref 136–145)
WBC # BLD AUTO: 8.75 X10(3)/MCL (ref 4.5–11.5)

## 2025-08-27 PROCEDURE — 85025 COMPLETE CBC W/AUTO DIFF WBC: CPT | Performed by: INTERNAL MEDICINE

## 2025-08-27 PROCEDURE — 84134 ASSAY OF PREALBUMIN: CPT | Performed by: INTERNAL MEDICINE

## 2025-08-27 PROCEDURE — 80053 COMPREHEN METABOLIC PANEL: CPT | Performed by: INTERNAL MEDICINE

## 2025-09-03 ENCOUNTER — OFFICE VISIT (OUTPATIENT)
Dept: FAMILY MEDICINE | Facility: CLINIC | Age: OVER 89
End: 2025-09-03
Payer: MEDICARE

## 2025-09-03 DIAGNOSIS — F05 SUNDOWNING: Primary | ICD-10-CM

## 2025-09-03 PROCEDURE — 98013 SYNCH AUDIO-ONLY EST LOW 20: CPT | Mod: 93,,, | Performed by: INTERNAL MEDICINE

## 2025-09-03 RX ORDER — LIDOCAINE HYDROCHLORIDE 20 MG/ML
SOLUTION ORAL; TOPICAL
COMMUNITY
Start: 2025-08-26

## 2025-09-05 ENCOUNTER — HOSPITAL ENCOUNTER (OUTPATIENT)
Dept: RADIOLOGY | Facility: HOSPITAL | Age: OVER 89
Discharge: HOME | End: 2025-09-05
Attending: INTERNAL MEDICINE
Payer: MEDICARE

## 2025-09-05 DIAGNOSIS — R41.3 OTHER AMNESIA: ICD-10-CM

## 2025-09-05 PROCEDURE — 70551 MRI BRAIN STEM W/O DYE: CPT | Mod: TC

## (undated) DEVICE — VALVE GUARDIAN II HEMOSTASIS

## (undated) DEVICE — SYS WASTE FLD DISPOSAL 1400ML

## (undated) DEVICE — Device

## (undated) DEVICE — GUIDE LAUNCHER 6FR EBU 3.5

## (undated) DEVICE — CATH NC EMERGE MR 3.25X15MM

## (undated) DEVICE — CATH EAGLE EYE PLATINUM

## (undated) DEVICE — SHEATH INTRODUCER 6FR 11CM

## (undated) DEVICE — GOWN POLY REINF BRTH SLV XL

## (undated) DEVICE — CATH IMPULSE MP 5FR 145CM

## (undated) DEVICE — GUIDEWIRE RUNTHROUGH EF 180CM

## (undated) DEVICE — PAD DEFIB CADENCE ADULT R2

## (undated) DEVICE — CATH EMERGE MR 15 X 2.75

## (undated) DEVICE — SHEATH INTRODUCER 5F 10CM

## (undated) DEVICE — GUIDEWIRE INQWIRE SE 3MM JTIP

## (undated) DEVICE — DEVICE BASIXCOMPAK INFL 20ML

## (undated) DEVICE — CANNULA NASAL ADULT

## (undated) DEVICE — SET ANGIO ACIST CVI ANGIOTOUCH